# Patient Record
Sex: FEMALE | Race: WHITE | Employment: UNEMPLOYED | URBAN - NONMETROPOLITAN AREA
[De-identification: names, ages, dates, MRNs, and addresses within clinical notes are randomized per-mention and may not be internally consistent; named-entity substitution may affect disease eponyms.]

---

## 2016-05-14 LAB
AVERAGE GLUCOSE: NORMAL
HBA1C MFR BLD: 5.4 %

## 2019-03-29 LAB
ALBUMIN SERPL-MCNC: NORMAL G/DL
ALP BLD-CCNC: NORMAL U/L
ALT SERPL-CCNC: NORMAL U/L
ANION GAP SERPL CALCULATED.3IONS-SCNC: NORMAL MMOL/L
AST SERPL-CCNC: NORMAL U/L
BILIRUB SERPL-MCNC: NORMAL MG/DL
BUN BLDV-MCNC: NORMAL MG/DL
CALCIUM SERPL-MCNC: NORMAL MG/DL
CHLORIDE BLD-SCNC: NORMAL MMOL/L
CHOLESTEROL, TOTAL: 188 MG/DL
CHOLESTEROL, TOTAL: 188 MG/DL
CHOLESTEROL/HDL RATIO: 3.5
CHOLESTEROL/HDL RATIO: 3.5
CO2: NORMAL
CREAT SERPL-MCNC: NORMAL MG/DL
GFR CALCULATED: NORMAL
GLUCOSE BLD-MCNC: NORMAL MG/DL
HDLC SERPL-MCNC: 53 MG/DL (ref 35–70)
HDLC SERPL-MCNC: 53 MG/DL (ref 35–70)
LDL CHOLESTEROL CALCULATED: 117 MG/DL (ref 0–160)
LDL CHOLESTEROL CALCULATED: 117 MG/DL (ref 0–160)
POTASSIUM SERPL-SCNC: NORMAL MMOL/L
SODIUM BLD-SCNC: NORMAL MMOL/L
TOTAL PROTEIN: NORMAL
TRIGL SERPL-MCNC: 82 MG/DL
TRIGL SERPL-MCNC: 82 MG/DL
VLDLC SERPL CALC-MCNC: 136 MG/DL
VLDLC SERPL CALC-MCNC: NORMAL MG/DL

## 2019-09-25 RX ORDER — LEVOTHYROXINE SODIUM 0.05 MG/1
50 TABLET ORAL DAILY
COMMUNITY
End: 2019-09-30 | Stop reason: SDUPTHER

## 2019-09-30 ENCOUNTER — OFFICE VISIT (OUTPATIENT)
Dept: FAMILY MEDICINE CLINIC | Age: 54
End: 2019-09-30
Payer: COMMERCIAL

## 2019-09-30 ENCOUNTER — HOSPITAL ENCOUNTER (OUTPATIENT)
Age: 54
Discharge: HOME | End: 2019-10-02
Payer: COMMERCIAL

## 2019-09-30 VITALS
WEIGHT: 149.6 LBS | HEART RATE: 74 BPM | BODY MASS INDEX: 26.51 KG/M2 | SYSTOLIC BLOOD PRESSURE: 134 MMHG | HEIGHT: 63 IN | DIASTOLIC BLOOD PRESSURE: 84 MMHG | OXYGEN SATURATION: 97 % | TEMPERATURE: 98 F

## 2019-09-30 DIAGNOSIS — E03.9 HYPOTHYROIDISM, UNSPECIFIED TYPE: ICD-10-CM

## 2019-09-30 DIAGNOSIS — E03.9 HYPOTHYROIDISM, UNSPECIFIED TYPE: Primary | ICD-10-CM

## 2019-09-30 LAB
T4 FREE: 1.54 NG/DL (ref 0.93–1.7)
TSH SERPL DL<=0.05 MIU/L-ACNC: 3.76 UIU/ML (ref 0.27–4.2)

## 2019-09-30 PROCEDURE — 84439 ASSAY OF FREE THYROXINE: CPT

## 2019-09-30 PROCEDURE — 90686 IIV4 VACC NO PRSV 0.5 ML IM: CPT | Performed by: FAMILY MEDICINE

## 2019-09-30 PROCEDURE — 90471 IMMUNIZATION ADMIN: CPT | Performed by: FAMILY MEDICINE

## 2019-09-30 PROCEDURE — 99214 OFFICE O/P EST MOD 30 MIN: CPT | Performed by: FAMILY MEDICINE

## 2019-09-30 PROCEDURE — 84443 ASSAY THYROID STIM HORMONE: CPT

## 2019-09-30 PROCEDURE — 36415 COLL VENOUS BLD VENIPUNCTURE: CPT

## 2019-09-30 RX ORDER — UBIDECARENONE 75 MG
50 CAPSULE ORAL DAILY
COMMUNITY

## 2019-09-30 RX ORDER — M-VIT,TX,IRON,MINS/CALC/FOLIC 27MG-0.4MG
1 TABLET ORAL DAILY
COMMUNITY
End: 2020-05-07

## 2019-09-30 RX ORDER — LEVOTHYROXINE SODIUM 0.05 MG/1
50 TABLET ORAL DAILY
Qty: 90 TABLET | Refills: 1 | Status: SHIPPED | OUTPATIENT
Start: 2019-09-30 | End: 2019-11-22 | Stop reason: SDUPTHER

## 2019-09-30 RX ORDER — CETIRIZINE HYDROCHLORIDE 10 MG/1
10 TABLET ORAL DAILY
COMMUNITY
End: 2021-08-25

## 2019-09-30 ASSESSMENT — PATIENT HEALTH QUESTIONNAIRE - PHQ9
SUM OF ALL RESPONSES TO PHQ QUESTIONS 1-9: 0
SUM OF ALL RESPONSES TO PHQ9 QUESTIONS 1 & 2: 0
1. LITTLE INTEREST OR PLEASURE IN DOING THINGS: 0
SUM OF ALL RESPONSES TO PHQ QUESTIONS 1-9: 0
2. FEELING DOWN, DEPRESSED OR HOPELESS: 0

## 2019-09-30 ASSESSMENT — ENCOUNTER SYMPTOMS
ABDOMINAL PAIN: 0
CONSTIPATION: 0
BACK PAIN: 0
SORE THROAT: 0
SHORTNESS OF BREATH: 0
SINUS PAIN: 0
DIARRHEA: 0
COUGH: 0
EYE PAIN: 0

## 2019-10-10 ENCOUNTER — TELEPHONE (OUTPATIENT)
Dept: FAMILY MEDICINE CLINIC | Age: 54
End: 2019-10-10

## 2019-11-22 RX ORDER — LEVOTHYROXINE SODIUM 0.05 MG/1
50 TABLET ORAL DAILY
Qty: 90 TABLET | Refills: 1 | Status: SHIPPED
Start: 2019-11-22 | End: 2020-05-08 | Stop reason: SDUPTHER

## 2020-03-24 ENCOUNTER — TELEPHONE (OUTPATIENT)
Dept: FAMILY MEDICINE CLINIC | Age: 55
End: 2020-03-24

## 2020-04-16 ENCOUNTER — TELEPHONE (OUTPATIENT)
Dept: FAMILY MEDICINE CLINIC | Age: 55
End: 2020-04-16

## 2020-04-27 ENCOUNTER — TELEPHONE (OUTPATIENT)
Dept: FAMILY MEDICINE CLINIC | Age: 55
End: 2020-04-27

## 2020-04-28 ENCOUNTER — HOSPITAL ENCOUNTER (EMERGENCY)
Age: 55
Discharge: HOME | End: 2020-04-28
Attending: EMERGENCY MEDICINE
Payer: COMMERCIAL

## 2020-04-28 VITALS
WEIGHT: 150 LBS | TEMPERATURE: 97.7 F | SYSTOLIC BLOOD PRESSURE: 159 MMHG | BODY MASS INDEX: 26.58 KG/M2 | OXYGEN SATURATION: 98 % | DIASTOLIC BLOOD PRESSURE: 72 MMHG | RESPIRATION RATE: 16 BRPM | HEIGHT: 63 IN | HEART RATE: 91 BPM

## 2020-04-28 PROCEDURE — 99283 EMERGENCY DEPT VISIT LOW MDM: CPT

## 2020-04-28 RX ORDER — AMOXICILLIN AND CLAVULANATE POTASSIUM 875; 125 MG/1; MG/1
1 TABLET, FILM COATED ORAL 2 TIMES DAILY
Qty: 20 TABLET | Refills: 0 | Status: SHIPPED | OUTPATIENT
Start: 2020-04-28 | End: 2020-05-08

## 2020-04-28 RX ORDER — AZELASTINE 1 MG/ML
1 SPRAY, METERED NASAL 2 TIMES DAILY
Qty: 2 BOTTLE | Refills: 1 | Status: SHIPPED | OUTPATIENT
Start: 2020-04-28 | End: 2022-07-05 | Stop reason: SDUPTHER

## 2020-04-28 ASSESSMENT — ENCOUNTER SYMPTOMS
RHINORRHEA: 1
SHORTNESS OF BREATH: 0
BACK PAIN: 0
DIARRHEA: 0
SORE THROAT: 0
VOMITING: 0
BLOOD IN STOOL: 0
CONSTIPATION: 0
WHEEZING: 0
CHEST TIGHTNESS: 0
SINUS PRESSURE: 1
NAUSEA: 0
COUGH: 1
ABDOMINAL PAIN: 0

## 2020-04-28 NOTE — ED PROVIDER NOTES
Patient is 59-year-old female with no significant past medical history presents emergency department complaint of cough and nasal drainage. Patient states that she gets seasonal allergies twice a year. This is consistent with her seasonal allergies. States also that she has sinus pressure. Patient also concerned about left ear pain. Contacted her primary care physician but he directed her to come the emergency department instead of in their office. She has been using over-the-counter nasal sprays with little improvement. Patient has not established care with an ENT. She denies fever, chest pain, shortness of breath, abdominal pain, nausea/vomiting, other symptoms. Patient works at a nursing home. No COVID-19 patients at the nursing home. Review of Systems   Constitutional: Negative for appetite change, diaphoresis and fever. HENT: Positive for ear pain, postnasal drip, rhinorrhea and sinus pressure. Negative for congestion and sore throat. Eyes: Negative for visual disturbance. Respiratory: Positive for cough. Negative for chest tightness, shortness of breath and wheezing. Cardiovascular: Negative for chest pain, palpitations and leg swelling. Gastrointestinal: Negative for abdominal pain, blood in stool, constipation, diarrhea, nausea and vomiting. Endocrine: Negative for polyuria. Genitourinary: Negative for decreased urine volume, dysuria and vaginal discharge. Musculoskeletal: Negative for back pain, neck pain and neck stiffness. Skin: Negative for rash. Neurological: Negative for syncope, weakness, light-headedness and headaches. Hematological: Negative for adenopathy. Psychiatric/Behavioral: Negative for confusion. Physical Exam  Vitals signs and nursing note reviewed. Constitutional:       General: She is not in acute distress. Appearance: Normal appearance. She is normal weight. She is not ill-appearing, toxic-appearing or diaphoretic.    HENT: Head: Normocephalic and atraumatic. Right Ear: Tympanic membrane, ear canal and external ear normal. There is no impacted cerumen. Left Ear: External ear normal. There is no impacted cerumen. Ears:      Comments: Exudate noted behind left tympanic membrane. Nose: Rhinorrhea present. No congestion. Mouth/Throat:      Mouth: Mucous membranes are moist.      Pharynx: Posterior oropharyngeal erythema present. No oropharyngeal exudate. Comments: Erythematous oropharynx with cobblestoning. No tonsillar hypertrophy. Eyes:      General: No scleral icterus. Right eye: No discharge. Left eye: No discharge. Extraocular Movements: Extraocular movements intact. Pupils: Pupils are equal, round, and reactive to light. Neck:      Musculoskeletal: Normal range of motion and neck supple. No neck rigidity or muscular tenderness. Vascular: No carotid bruit. Cardiovascular:      Rate and Rhythm: Normal rate and regular rhythm. Pulses: Normal pulses. Heart sounds: Normal heart sounds. No murmur. No friction rub. No gallop. Pulmonary:      Effort: Pulmonary effort is normal. No respiratory distress. Breath sounds: Normal breath sounds. No stridor. No wheezing, rhonchi or rales. Chest:      Chest wall: No tenderness. Abdominal:      General: Abdomen is flat. There is no distension. Palpations: Abdomen is soft. There is no mass. Tenderness: There is no abdominal tenderness. There is no right CVA tenderness, left CVA tenderness, guarding or rebound. Hernia: No hernia is present. Musculoskeletal: Normal range of motion. General: No swelling, tenderness, deformity or signs of injury. Right lower leg: No edema. Left lower leg: No edema. Lymphadenopathy:      Cervical: No cervical adenopathy. Skin:     General: Skin is warm. Capillary Refill: Capillary refill takes less than 2 seconds. Findings: No rash. Seasonal allergic rhinitis, unspecified trigger    2. Acute otitis media, unspecified otitis media type    3. Viral URI with cough        Disposition:  Patient's disposition: Discharge to home  Patient's condition is stable.            Aleks Egan,   Resident  04/28/20 [id number]

## 2020-04-28 NOTE — ED NOTES
Bed: 02  Expected date:   Expected time:   Means of arrival:   Comments:  Walking well resp     Hallie Daniels RN  04/28/20 [id number]

## 2020-04-30 ENCOUNTER — TELEPHONE (OUTPATIENT)
Dept: PRIMARY CARE CLINIC | Age: 55
End: 2020-04-30

## 2020-05-07 ENCOUNTER — VIRTUAL VISIT (OUTPATIENT)
Dept: PRIMARY CARE CLINIC | Age: 55
End: 2020-05-07
Payer: COMMERCIAL

## 2020-05-07 PROCEDURE — 99214 OFFICE O/P EST MOD 30 MIN: CPT | Performed by: FAMILY MEDICINE

## 2020-05-08 RX ORDER — LEVOTHYROXINE SODIUM 0.05 MG/1
50 TABLET ORAL DAILY
Qty: 90 TABLET | Refills: 1 | Status: SHIPPED
Start: 2020-05-08 | End: 2020-12-11 | Stop reason: SDUPTHER

## 2020-05-17 PROBLEM — H66.90 ACUTE OTITIS MEDIA: Status: ACTIVE | Noted: 2020-05-17

## 2020-05-17 PROBLEM — E03.9 ACQUIRED HYPOTHYROIDISM: Status: ACTIVE | Noted: 2020-05-17

## 2020-05-17 ASSESSMENT — ENCOUNTER SYMPTOMS
VOMITING: 0
COUGH: 0
CONSTIPATION: 0
WHEEZING: 0
NAUSEA: 0
BACK PAIN: 0
SHORTNESS OF BREATH: 0
DIARRHEA: 0
ABDOMINAL PAIN: 0

## 2020-07-07 PROBLEM — H66.90 ACUTE OTITIS MEDIA: Status: RESOLVED | Noted: 2020-05-17 | Resolved: 2020-07-07

## 2020-07-07 ASSESSMENT — ENCOUNTER SYMPTOMS
DIARRHEA: 0
WHEEZING: 0
COUGH: 0
VOMITING: 0
NAUSEA: 0
CONSTIPATION: 0
ABDOMINAL PAIN: 0
SHORTNESS OF BREATH: 0
BACK PAIN: 0

## 2020-07-08 ENCOUNTER — OFFICE VISIT (OUTPATIENT)
Dept: PRIMARY CARE CLINIC | Age: 55
End: 2020-07-08
Payer: COMMERCIAL

## 2020-07-08 ENCOUNTER — HOSPITAL ENCOUNTER (OUTPATIENT)
Age: 55
Discharge: HOME | End: 2020-07-10
Payer: COMMERCIAL

## 2020-07-08 VITALS
HEART RATE: 82 BPM | DIASTOLIC BLOOD PRESSURE: 80 MMHG | OXYGEN SATURATION: 98 % | SYSTOLIC BLOOD PRESSURE: 110 MMHG | WEIGHT: 139 LBS | BODY MASS INDEX: 24.63 KG/M2 | HEIGHT: 63 IN

## 2020-07-08 LAB
ALBUMIN SERPL-MCNC: 4.5 G/DL (ref 3.5–5.2)
ALP BLD-CCNC: 67 U/L (ref 35–104)
ALT SERPL-CCNC: 9 U/L (ref 0–32)
ANION GAP SERPL CALCULATED.3IONS-SCNC: 16 MMOL/L (ref 7–16)
AST SERPL-CCNC: 12 U/L (ref 0–31)
BASOPHILS ABSOLUTE: 0.07 E9/L (ref 0–0.2)
BASOPHILS RELATIVE PERCENT: 1 % (ref 0–2)
BILIRUB SERPL-MCNC: 0.4 MG/DL (ref 0–1.2)
BUN BLDV-MCNC: 10 MG/DL (ref 6–20)
CALCIUM SERPL-MCNC: 9.2 MG/DL (ref 8.6–10.2)
CHLORIDE BLD-SCNC: 101 MMOL/L (ref 98–107)
CHOLESTEROL, TOTAL: 193 MG/DL (ref 0–199)
CO2: 23 MMOL/L (ref 22–29)
CREAT SERPL-MCNC: 0.9 MG/DL (ref 0.5–1)
CREATININE URINE: 67 MG/DL (ref 29–226)
EOSINOPHILS ABSOLUTE: 0.27 E9/L (ref 0.05–0.5)
EOSINOPHILS RELATIVE PERCENT: 3.7 % (ref 0–6)
GFR AFRICAN AMERICAN: >60
GFR NON-AFRICAN AMERICAN: >60 ML/MIN/1.73
GLUCOSE BLD-MCNC: 103 MG/DL (ref 74–99)
HBA1C MFR BLD: 5.3 % (ref 4–5.6)
HCT VFR BLD CALC: 40.2 % (ref 34–48)
HDLC SERPL-MCNC: 61 MG/DL
HEMOGLOBIN: 12.7 G/DL (ref 11.5–15.5)
IMMATURE GRANULOCYTES #: 0.02 E9/L
IMMATURE GRANULOCYTES %: 0.3 % (ref 0–5)
LDL CHOLESTEROL CALCULATED: 115 MG/DL (ref 0–99)
LYMPHOCYTES ABSOLUTE: 2.64 E9/L (ref 1.5–4)
LYMPHOCYTES RELATIVE PERCENT: 36.2 % (ref 20–42)
MCH RBC QN AUTO: 28.2 PG (ref 26–35)
MCHC RBC AUTO-ENTMCNC: 31.6 % (ref 32–34.5)
MCV RBC AUTO: 89.1 FL (ref 80–99.9)
MICROALBUMIN UR-MCNC: 38.4 MG/L
MICROALBUMIN/CREAT UR-RTO: 57.3 (ref 0–30)
MONOCYTES ABSOLUTE: 0.53 E9/L (ref 0.1–0.95)
MONOCYTES RELATIVE PERCENT: 7.3 % (ref 2–12)
NEUTROPHILS ABSOLUTE: 3.76 E9/L (ref 1.8–7.3)
NEUTROPHILS RELATIVE PERCENT: 51.5 % (ref 43–80)
PDW BLD-RTO: 13.8 FL (ref 11.5–15)
PLATELET # BLD: 323 E9/L (ref 130–450)
PMV BLD AUTO: 10 FL (ref 7–12)
POTASSIUM SERPL-SCNC: 4 MMOL/L (ref 3.5–5)
RBC # BLD: 4.51 E12/L (ref 3.5–5.5)
SODIUM BLD-SCNC: 140 MMOL/L (ref 132–146)
TOTAL PROTEIN: 7.2 G/DL (ref 6.4–8.3)
TRIGL SERPL-MCNC: 83 MG/DL (ref 0–149)
TSH SERPL DL<=0.05 MIU/L-ACNC: 3.25 UIU/ML (ref 0.27–4.2)
URIC ACID, SERUM: 4.9 MG/DL (ref 2.4–5.7)
VITAMIN D 25-HYDROXY: 31 NG/ML (ref 30–100)
VLDLC SERPL CALC-MCNC: 17 MG/DL
WBC # BLD: 7.3 E9/L (ref 4.5–11.5)

## 2020-07-08 PROCEDURE — 83036 HEMOGLOBIN GLYCOSYLATED A1C: CPT

## 2020-07-08 PROCEDURE — 80061 LIPID PANEL: CPT

## 2020-07-08 PROCEDURE — 80053 COMPREHEN METABOLIC PANEL: CPT

## 2020-07-08 PROCEDURE — 82044 UR ALBUMIN SEMIQUANTITATIVE: CPT

## 2020-07-08 PROCEDURE — 82306 VITAMIN D 25 HYDROXY: CPT

## 2020-07-08 PROCEDURE — 84443 ASSAY THYROID STIM HORMONE: CPT

## 2020-07-08 PROCEDURE — 36415 COLL VENOUS BLD VENIPUNCTURE: CPT

## 2020-07-08 PROCEDURE — 82570 ASSAY OF URINE CREATININE: CPT

## 2020-07-08 PROCEDURE — 85025 COMPLETE CBC W/AUTO DIFF WBC: CPT

## 2020-07-08 PROCEDURE — 84550 ASSAY OF BLOOD/URIC ACID: CPT

## 2020-07-08 PROCEDURE — 99396 PREV VISIT EST AGE 40-64: CPT | Performed by: FAMILY MEDICINE

## 2020-07-08 ASSESSMENT — PATIENT HEALTH QUESTIONNAIRE - PHQ9
2. FEELING DOWN, DEPRESSED OR HOPELESS: 0
SUM OF ALL RESPONSES TO PHQ9 QUESTIONS 1 & 2: 0
SUM OF ALL RESPONSES TO PHQ QUESTIONS 1-9: 0
1. LITTLE INTEREST OR PLEASURE IN DOING THINGS: 0
SUM OF ALL RESPONSES TO PHQ QUESTIONS 1-9: 0

## 2020-07-08 NOTE — PROGRESS NOTES
Dispense Refill    levothyroxine (SYNTHROID) 50 MCG tablet Take 1 tablet by mouth Daily 90 tablet 1    azelastine (ASTELIN) 0.1 % nasal spray 1 spray by Nasal route 2 times daily Use in each nostril as directed 2 Bottle 1    vitamin B-12 (CYANOCOBALAMIN) 100 MCG tablet Take 50 mcg by mouth daily      cetirizine (ZYRTEC) 10 MG tablet Take 10 mg by mouth daily       No current facility-administered medications on file prior to visit. Allergies   Allergen Reactions    Oxycodone-Acetaminophen Other (See Comments)       Past Medical History:   Diagnosis Date    Anxiety     Depression     Hypothyroidism      Past Surgical History:   Procedure Laterality Date    BREAST BIOPSY Left     PARTIAL HYSTERECTOMY Left     Left ovary gone due to endometriosis - has cervix and right ovary     History reviewed. No pertinent family history.   Social History     Socioeconomic History    Marital status: Unknown     Spouse name: Not on file    Number of children: Not on file    Years of education: Not on file    Highest education level: Not on file   Occupational History    Not on file   Social Needs    Financial resource strain: Not on file    Food insecurity     Worry: Not on file     Inability: Not on file    Transportation needs     Medical: Not on file     Non-medical: Not on file   Tobacco Use    Smoking status: Former Smoker     Packs/day: 1.00     Years: 10.00     Pack years: 10.00     Types: Cigarettes    Smokeless tobacco: Never Used   Substance and Sexual Activity    Alcohol use: Not Currently     Comment: margaret     Drug use: Never    Sexual activity: Not on file   Lifestyle    Physical activity     Days per week: Not on file     Minutes per session: Not on file    Stress: Not on file   Relationships    Social connections     Talks on phone: Not on file     Gets together: Not on file     Attends Holiness service: Not on file     Active member of club or organization: Not on file     Attends meetings of clubs or organizations: Not on file     Relationship status: Not on file    Intimate partner violence     Fear of current or ex partner: Not on file     Emotionally abused: Not on file     Physically abused: Not on file     Forced sexual activity: Not on file   Other Topics Concern    Not on file   Social History Narrative    Not on file       Vitals:    07/08/20 0836   BP: 110/80   Pulse: 82   SpO2: 98%   Weight: 139 lb (63 kg)   Height: 5' 3\" (1.6 m)       Physical Exam:  Physical Exam  Vitals signs and nursing note reviewed. Constitutional:       General: She is not in acute distress. Appearance: Normal appearance. She is well-developed. HENT:      Head: Normocephalic and atraumatic. Right Ear: Hearing and external ear normal.      Left Ear: Hearing and external ear normal.      Nose: Nose normal.   Eyes:      General: Lids are normal. No scleral icterus. Extraocular Movements: Extraocular movements intact. Conjunctiva/sclera: Conjunctivae normal.   Neck:      Musculoskeletal: Normal range of motion. Pulmonary:      Effort: Pulmonary effort is normal. No respiratory distress. Breath sounds: No wheezing. Musculoskeletal: Normal range of motion. Skin:     Findings: No rash. Neurological:      Mental Status: She is alert and oriented to person, place, and time.    Psychiatric:         Mood and Affect: Mood and affect normal.         Speech: Speech normal.         Behavior: Behavior normal.         Labs:  CBC with Differential:  No results found for: WBC, RBC, HGB, HCT, PLT, MCV, MCH, MCHC, RDW, NRBC, SEGSPCT, BANDSPCT, BLASTSPCT, METASPCT, LYMPHOPCT, PROMYELOPCT, MONOPCT, MYELOPCT, EOSPCT, BASOPCT, MONOSABS, LYMPHSABS, EOSABS, BASOSABS, DIFFTYPE  CMP:  No results found for: NA, K, CL, CO2, BUN, CREATININE, GFRAA, AGRATIO, LABGLOM, GLUCOSE, PROT, LABALBU, CALCIUM, BILITOT, ALKPHOS, AST, ALT  HgBA1c:    Lab Results   Component Value Date    LABA1C 5.4 05/14/2016 FLP:    Lab Results   Component Value Date    TRIG 82 03/29/2019    HDL 53 03/29/2019    LDLCALC 117 03/29/2019     TSH:    Lab Results   Component Value Date    TSH 3.760 09/30/2019        Assessment and Plan:  Teresita Jane was seen today for annual exam and hypothyroidism. Diagnoses and all orders for this visit:    Encounter for well adult exam with abnormal findings  -     Lipid Panel; Future  Healthy 55 yo female. Due for PAP, Mammo, Colonoscopy, but she is very busy with  health issues and moving. Would like to do cologuard for now. Due for labs. Form for insurance filled out and faxed. Acquired hypothyroidism  -     CBC Auto Differential; Future  -     Comprehensive Metabolic Panel; Future  -     TSH without Reflex; Future  -     Uric Acid; Future  -     Microalbumin / Creatinine Urine Ratio; Future  Due for labs. Has been controlled. Symptoms controlled. Screening for malignant neoplasm of colon  -     Cologuard (For External Results Only); Future  Low risk. Would like to do cologuard first.     Vitamin D insufficiency  -     Vitamin D 25 Hydroxy; Future    Impaired fasting glucose  -     Hemoglobin A1C; Future        Return in about 6 months (around 1/8/2021) for Recheck.       Seen By:  Elizabeth Hinojosa DO

## 2020-07-29 ENCOUNTER — TELEPHONE (OUTPATIENT)
Dept: PRIMARY CARE CLINIC | Age: 55
End: 2020-07-29

## 2020-07-29 NOTE — TELEPHONE ENCOUNTER
ADVISED PT THAT HENRI WAS POS.  SHE IS GOING TO HOLD OFF ON COLONOSCOPY AT THIS TIME AS SHE HAS TOO MUCH GOING ON

## 2020-12-11 RX ORDER — LEVOTHYROXINE SODIUM 0.05 MG/1
50 TABLET ORAL DAILY
Qty: 90 TABLET | Refills: 0 | Status: SHIPPED
Start: 2020-12-11 | End: 2021-01-15 | Stop reason: SDUPTHER

## 2021-01-15 ENCOUNTER — OFFICE VISIT (OUTPATIENT)
Dept: FAMILY MEDICINE CLINIC | Age: 56
End: 2021-01-15
Payer: COMMERCIAL

## 2021-01-15 VITALS
OXYGEN SATURATION: 97 % | SYSTOLIC BLOOD PRESSURE: 116 MMHG | TEMPERATURE: 98 F | HEART RATE: 77 BPM | WEIGHT: 145 LBS | HEIGHT: 63 IN | BODY MASS INDEX: 25.69 KG/M2 | DIASTOLIC BLOOD PRESSURE: 78 MMHG

## 2021-01-15 DIAGNOSIS — Z12.31 ENCOUNTER FOR SCREENING MAMMOGRAM FOR MALIGNANT NEOPLASM OF BREAST: ICD-10-CM

## 2021-01-15 DIAGNOSIS — E03.9 ACQUIRED HYPOTHYROIDISM: Primary | ICD-10-CM

## 2021-01-15 PROCEDURE — 99213 OFFICE O/P EST LOW 20 MIN: CPT | Performed by: FAMILY MEDICINE

## 2021-01-15 RX ORDER — LEVOTHYROXINE SODIUM 0.05 MG/1
50 TABLET ORAL DAILY
Qty: 90 TABLET | Refills: 3 | Status: SHIPPED
Start: 2021-01-15 | End: 2022-01-14 | Stop reason: SDUPTHER

## 2021-01-15 ASSESSMENT — ENCOUNTER SYMPTOMS
VOMITING: 0
COUGH: 0
SHORTNESS OF BREATH: 0
WHEEZING: 0
DIARRHEA: 0
NAUSEA: 0
BACK PAIN: 0
CONSTIPATION: 0
ABDOMINAL PAIN: 0

## 2021-01-15 ASSESSMENT — PATIENT HEALTH QUESTIONNAIRE - PHQ9
1. LITTLE INTEREST OR PLEASURE IN DOING THINGS: 0
2. FEELING DOWN, DEPRESSED OR HOPELESS: 0
SUM OF ALL RESPONSES TO PHQ QUESTIONS 1-9: 0

## 2021-01-15 NOTE — PROGRESS NOTES
1/15/21  [place] : 1965 Sex: female  Age: 54 y.o. Chief Complaint   Patient presents with    Thyroid Problem     HPI:  54 y.o. female patient presents today for 6 month follow up of chronic medical conditions, medication refills. Patient's chart, medical, surgical and medication history all reviewed. Hypothyroidism  Patient presents for routine follow up of Hypothyroidism. Current symptoms: none. Patient denies change in energy level, diarrhea, heat / cold intolerance, nervousness, palpitations and weight changes. Symptoms have been well-controlled. No difficulty swallowing or masses felt. Last TSH was 3.250. Patient is currently taking levothyroxine 50 mcg. ROS:  Review of Systems   Constitutional: Negative for chills, fatigue and fever. Respiratory: Negative for cough, shortness of breath and wheezing. Cardiovascular: Negative for chest pain and palpitations. Gastrointestinal: Negative for abdominal pain, constipation, diarrhea, nausea and vomiting. Musculoskeletal: Negative for arthralgias and back pain. Skin: Negative for rash. Neurological: Negative for dizziness and headaches. Psychiatric/Behavioral: Negative for dysphoric mood. The patient is not nervous/anxious. All other systems reviewed and are negative. Current Outpatient Medications on File Prior to Visit   Medication Sig Dispense Refill    azelastine (ASTELIN) 0.1 % nasal spray 1 spray by Nasal route 2 times daily Use in each nostril as directed 2 Bottle 1    vitamin B-12 (CYANOCOBALAMIN) 100 MCG tablet Take 50 mcg by mouth daily      cetirizine (ZYRTEC) 10 MG tablet Take 10 mg by mouth daily       No current facility-administered medications on file prior to visit.         Allergies   Allergen Reactions    Oxycodone-Acetaminophen Other (See Comments)       Past Medical History:   Diagnosis Date    Anxiety     Depression     Hypothyroidism      Past Surgical History:   Procedure Laterality Date    BREAST BIOPSY Left     PARTIAL HYSTERECTOMY Left     Left ovary gone due to endometriosis - has cervix and right ovary     History reviewed. No pertinent family history. Social History     Socioeconomic History    Marital status: Unknown     Spouse name: Not on file    Number of children: Not on file    Years of education: Not on file    Highest education level: Not on file   Occupational History    Not on file   Social Needs    Financial resource strain: Not on file    Food insecurity     Worry: Not on file     Inability: Not on file    Transportation needs     Medical: Not on file     Non-medical: Not on file   Tobacco Use    Smoking status: Former Smoker     Packs/day: 1.00     Years: 10.00     Pack years: 10.00     Types: Cigarettes    Smokeless tobacco: Never Used   Substance and Sexual Activity    Alcohol use: Not Currently     Comment: margaret     Drug use: Never    Sexual activity: Not on file   Lifestyle    Physical activity     Days per week: Not on file     Minutes per session: Not on file    Stress: Not on file   Relationships    Social connections     Talks on phone: Not on file     Gets together: Not on file     Attends Baptism service: Not on file     Active member of club or organization: Not on file     Attends meetings of clubs or organizations: Not on file     Relationship status: Not on file    Intimate partner violence     Fear of current or ex partner: Not on file     Emotionally abused: Not on file     Physically abused: Not on file     Forced sexual activity: Not on file   Other Topics Concern    Not on file   Social History Narrative    Not on file       Vitals:    01/15/21 0911   BP: 116/78   Pulse: 77   Temp: 98 °F (36.7 °C)   TempSrc: Temporal   SpO2: 97%   Weight: 145 lb (65.8 kg)   Height: 5' 3\" (1.6 m)       Physical Exam:  Physical Exam  Vitals signs and nursing note reviewed. Constitutional:       General: She is not in acute distress.      Appearance: Normal appearance. She is well-developed. HENT:      Head: Normocephalic and atraumatic. Right Ear: Hearing and external ear normal.      Left Ear: Hearing and external ear normal.      Nose: Nose normal.   Eyes:      General: Lids are normal. No scleral icterus. Extraocular Movements: Extraocular movements intact. Conjunctiva/sclera: Conjunctivae normal.   Neck:      Musculoskeletal: Normal range of motion. Pulmonary:      Effort: Pulmonary effort is normal. No respiratory distress. Breath sounds: No wheezing. Musculoskeletal: Normal range of motion. Skin:     Findings: No rash. Neurological:      Mental Status: She is alert and oriented to person, place, and time.    Psychiatric:         Mood and Affect: Mood and affect normal.         Speech: Speech normal.         Behavior: Behavior normal.         Labs:  CBC with Differential:    Lab Results   Component Value Date    WBC 7.3 07/08/2020    RBC 4.51 07/08/2020    HGB 12.7 07/08/2020    HCT 40.2 07/08/2020     07/08/2020    MCV 89.1 07/08/2020    MCH 28.2 07/08/2020    MCHC 31.6 07/08/2020    RDW 13.8 07/08/2020    LYMPHOPCT 36.2 07/08/2020    MONOPCT 7.3 07/08/2020    BASOPCT 1.0 07/08/2020    MONOSABS 0.53 07/08/2020    LYMPHSABS 2.64 07/08/2020    EOSABS 0.27 07/08/2020    BASOSABS 0.07 07/08/2020     CMP:    Lab Results   Component Value Date     07/08/2020    K 4.0 07/08/2020     07/08/2020    CO2 23 07/08/2020    BUN 10 07/08/2020    CREATININE 0.9 07/08/2020    GFRAA >60 07/08/2020    LABGLOM >60 07/08/2020    GLUCOSE 103 07/08/2020    PROT 7.2 07/08/2020    LABALBU 4.5 07/08/2020    CALCIUM 9.2 07/08/2020    BILITOT 0.4 07/08/2020    ALKPHOS 67 07/08/2020    AST 12 07/08/2020    ALT 9 07/08/2020     HgBA1c:    Lab Results   Component Value Date    LABA1C 5.3 07/08/2020     FLP:    Lab Results   Component Value Date    TRIG 83 07/08/2020    HDL 61 07/08/2020    LDLCALC 115 07/08/2020    LABVLDL 17 07/08/2020 TSH:    Lab Results   Component Value Date    TSH 3.250 07/08/2020        Assessment and Plan:  Yariel Cruz was seen today for thyroid problem. Diagnoses and all orders for this visit:    Acquired hypothyroidism  -     levothyroxine (SYNTHROID) 50 MCG tablet; Take 1 tablet by mouth Daily  Stable. No symptoms. Recheck in 1 year. Encounter for screening mammogram for malignant neoplasm of breast  -     [facility] DIGITAL SCREEN BILATERAL; Future  Overdue. Last checked in 2016    Needs colonoscopy due to positive Cologuard, but dealing with a lot with  mental health and court issues. Would like to hold off for now. Return in about 1 year (around 1/15/2022) for Well visit.       Seen By:  Osiel Smith,

## 2021-04-15 ENCOUNTER — TELEPHONE (OUTPATIENT)
Dept: GENERAL RADIOLOGY | Age: 56
End: 2021-04-15

## 2021-04-15 DIAGNOSIS — R92.8 ABNORMAL MAMMOGRAM: Primary | ICD-10-CM

## 2021-04-15 NOTE — TELEPHONE ENCOUNTER
Call to patient in reference to her mammogram performed at [facility] on April 14, 2021. Instructed patient that the radiologist has recommended some additional breast imaging  in order to make a final determination/result. Additional imaging to be completed at [address] [place] [address] Dr Art. Verbalizes understanding and is agreeable to proceed. Patient states she will give me a call back next week, after she gets her work schedule.       Justin Iqbal RN, BSN, [address]

## 2021-04-22 ENCOUNTER — HOSPITAL ENCOUNTER (OUTPATIENT)
Dept: GENERAL RADIOLOGY | Age: 56
Discharge: HOME | End: 2021-04-24
Payer: COMMERCIAL

## 2021-04-22 DIAGNOSIS — R92.8 ABNORMAL MAMMOGRAM OF RIGHT BREAST: ICD-10-CM

## 2021-04-22 DIAGNOSIS — R92.8 ABNORMAL MAMMOGRAM: ICD-10-CM

## 2021-04-22 PROCEDURE — 77065 DX MAMMO INCL CAD UNI: CPT

## 2021-04-22 PROCEDURE — 76642 ULTRASOUND BREAST LIMITED: CPT

## 2021-04-23 DIAGNOSIS — R92.8 ABNORMAL MAMMOGRAM OF RIGHT BREAST: Primary | ICD-10-CM

## 2021-04-27 ENCOUNTER — TELEPHONE (OUTPATIENT)
Dept: GENERAL RADIOLOGY | Age: 56
End: 2021-04-27

## 2021-04-27 NOTE — TELEPHONE ENCOUNTER
I was notified per [facility]  that patient called and stated she wants to wait until May to schedule her biopsy due to her work schedule.  Electronically signed by Gaye Michaud RN, BSN on 4/27/2021 at 8:46 AM

## 2021-05-06 ENCOUNTER — HOSPITAL ENCOUNTER (OUTPATIENT)
Dept: GENERAL RADIOLOGY | Age: 56
Discharge: HOME | End: 2021-05-08
Payer: COMMERCIAL

## 2021-05-06 DIAGNOSIS — R92.8 ABNORMAL MAMMOGRAM OF RIGHT BREAST: ICD-10-CM

## 2021-05-06 PROCEDURE — 77065 DX MAMMO INCL CAD UNI: CPT

## 2021-05-06 PROCEDURE — 88342 IMHCHEM/IMCYTCHM 1ST ANTB: CPT

## 2021-05-06 PROCEDURE — 88360 TUMOR IMMUNOHISTOCHEM/MANUAL: CPT

## 2021-05-06 PROCEDURE — A4648 IMPLANTABLE TISSUE MARKER: HCPCS

## 2021-05-06 PROCEDURE — 88341 IMHCHEM/IMCYTCHM EA ADD ANTB: CPT

## 2021-05-06 PROCEDURE — 2500000003 HC RX 250 WO HCPCS

## 2021-05-06 PROCEDURE — 88305 TISSUE EXAM BY PATHOLOGIST: CPT

## 2021-05-06 NOTE — PROGRESS NOTES
Met with patient and her  prior to her breast biopsy. Instructed on role of breast navigator and on breast biopsy procedure. Denies use of blood thinners or aspirin products within the past 5 days. I remained with her during the biopsy to provide instruction and emotional support. She tolerated procedure well. Instructed that results will be available in approximately 3-5 days. Provided with folder containing breast navigator's contact information, monthly breast self exam card, and post biopsy discharge instructions. Instructed to call if she has any questions or concerns about her biopsy. Verbalizes understanding.

## 2021-05-13 ENCOUNTER — TELEPHONE (OUTPATIENT)
Dept: GENERAL RADIOLOGY | Age: 56
End: 2021-05-13

## 2021-05-17 DIAGNOSIS — C50.911 INFILTRATING DUCTAL CARCINOMA OF RIGHT BREAST (HCC): Primary | ICD-10-CM

## 2021-05-18 ENCOUNTER — CLINICAL DOCUMENTATION (OUTPATIENT)
Dept: GENERAL RADIOLOGY | Age: 56
End: 2021-05-18

## 2021-05-18 ENCOUNTER — TELEPHONE (OUTPATIENT)
Dept: BREAST CENTER | Age: 56
End: 2021-05-18

## 2021-05-18 NOTE — PROGRESS NOTES
Per Dee Saravia at Dr. Estee Carmichael office, Carlos Boxer was contacted regarding referral to Dr. Vicenta Myers for surgical consultation and was offered an appointment. She declined to schedule appointment, when they had her on the phone yesterday. Packet placed in outgoing mail containing a guide to the breast cancer pathology report, handout on the diagnosis and treatment of invasive ductal carcinoma , exercises after breast surgery booklet,  [facility]olita's Sister Support group information, and list of outpatient counseling agencies and list of local oncology practices.   Electronically signed by Deanna Reyes RN, BSN on 5/18/2021 at 7:59 AM

## 2021-05-19 ENCOUNTER — TELEPHONE (OUTPATIENT)
Dept: BREAST CENTER | Age: 56
End: 2021-05-19

## 2021-05-19 ENCOUNTER — TELEPHONE (OUTPATIENT)
Dept: PRIMARY CARE CLINIC | Age: 56
End: 2021-05-19

## 2021-05-19 DIAGNOSIS — C50.911 INVASIVE DUCTAL CARCINOMA OF RIGHT BREAST (HCC): Primary | ICD-10-CM

## 2021-05-19 NOTE — TELEPHONE ENCOUNTER
The pt is calling because she has people calling her about referrals but she has no idea why, she says no one from the office has called and told her anything about anything

## 2021-05-20 ENCOUNTER — TELEPHONE (OUTPATIENT)
Dept: BREAST CENTER | Age: 56
End: 2021-05-20

## 2021-05-20 DIAGNOSIS — C50.911 MALIGNANT NEOPLASM OF RIGHT BREAST IN FEMALE, ESTROGEN RECEPTOR NEGATIVE, UNSPECIFIED SITE OF BREAST (HCC): Primary | ICD-10-CM

## 2021-05-20 DIAGNOSIS — Z17.1 MALIGNANT NEOPLASM OF RIGHT BREAST IN FEMALE, ESTROGEN RECEPTOR NEGATIVE, UNSPECIFIED SITE OF BREAST (HCC): Primary | ICD-10-CM

## 2021-05-20 NOTE — TELEPHONE ENCOUNTER
Called patient to obtain some information regarding her upcoming appointment in the Breast Clinic. Discussed family history and let her know she is a candidate for Genetic testing. Discussed that if is is convenient, we can obtain that sample along with her labs prior to her appointment. I also asked the patient if it is recommended for a breast MRI, would there be a date accommodating to her. Patient states she is off on Tuesday, May 25, 2021. Told patient I will discuss the above with Dr Fela Barillas and will call her back with plans moving forward.

## 2021-05-24 ENCOUNTER — TELEPHONE (OUTPATIENT)
Dept: BREAST CENTER | Age: 56
End: 2021-05-24

## 2021-05-24 NOTE — TELEPHONE ENCOUNTER
Hello,  I was out last week that is why I am just now responding. Ok to order MRI, but I don't know if you'll have insurance auth by tomorrow.

## 2021-05-24 NOTE — TELEPHONE ENCOUNTER
Called patient and left detailed message regarding her MRI and labs for 5/25/2021. Told patient we are still waiting for authorization but will notify her by the end of today either way.   Our number left for any questions

## 2021-05-25 ENCOUNTER — TELEPHONE (OUTPATIENT)
Dept: BREAST CENTER | Age: 56
End: 2021-05-25

## 2021-05-25 ENCOUNTER — HOSPITAL ENCOUNTER (OUTPATIENT)
Dept: MRI IMAGING | Age: 56
Discharge: HOME | End: 2021-05-27
Payer: COMMERCIAL

## 2021-05-25 DIAGNOSIS — C50.911 MALIGNANT NEOPLASM OF RIGHT BREAST IN FEMALE, ESTROGEN RECEPTOR NEGATIVE, UNSPECIFIED SITE OF BREAST (HCC): ICD-10-CM

## 2021-05-25 DIAGNOSIS — C50.911 INVASIVE DUCTAL CARCINOMA OF RIGHT BREAST (HCC): ICD-10-CM

## 2021-05-25 DIAGNOSIS — Z17.1 MALIGNANT NEOPLASM OF RIGHT BREAST IN FEMALE, ESTROGEN RECEPTOR NEGATIVE, UNSPECIFIED SITE OF BREAST (HCC): ICD-10-CM

## 2021-05-25 LAB
ALBUMIN SERPL-MCNC: 4.6 G/DL (ref 3.5–5.2)
ALP BLD-CCNC: 67 U/L (ref 35–104)
ALT SERPL-CCNC: 10 U/L (ref 0–32)
ANION GAP SERPL CALCULATED.3IONS-SCNC: 6 MMOL/L (ref 7–16)
AST SERPL-CCNC: 13 U/L (ref 0–31)
BILIRUB SERPL-MCNC: 0.4 MG/DL (ref 0–1.2)
BUN BLDV-MCNC: 12 MG/DL (ref 6–20)
CALCIUM SERPL-MCNC: 9.4 MG/DL (ref 8.6–10.2)
CHLORIDE BLD-SCNC: 106 MMOL/L (ref 98–107)
CO2: 27 MMOL/L (ref 22–29)
CREAT SERPL-MCNC: 0.7 MG/DL (ref 0.5–1)
GFR AFRICAN AMERICAN: >60
GFR NON-AFRICAN AMERICAN: >60 ML/MIN/1.73
GLUCOSE BLD-MCNC: 96 MG/DL (ref 74–99)
HCT VFR BLD CALC: 40 % (ref 34–48)
HEMOGLOBIN: 12.8 G/DL (ref 11.5–15.5)
MCH RBC QN AUTO: 28 PG (ref 26–35)
MCHC RBC AUTO-ENTMCNC: 32 % (ref 32–34.5)
MCV RBC AUTO: 87.5 FL (ref 80–99.9)
PDW BLD-RTO: 13.4 FL (ref 11.5–15)
PLATELET # BLD: 311 E9/L (ref 130–450)
PMV BLD AUTO: 8.6 FL (ref 7–12)
POTASSIUM SERPL-SCNC: 4.3 MMOL/L (ref 3.5–5)
RBC # BLD: 4.57 E12/L (ref 3.5–5.5)
SODIUM BLD-SCNC: 139 MMOL/L (ref 132–146)
TOTAL PROTEIN: 7.7 G/DL (ref 6.4–8.3)
WBC # BLD: 6.5 E9/L (ref 4.5–11.5)

## 2021-05-25 PROCEDURE — A9585 GADOBUTROL INJECTION: HCPCS | Performed by: RADIOLOGY

## 2021-05-25 PROCEDURE — 77049 MRI BREAST C-+ W/CAD BI: CPT

## 2021-05-25 PROCEDURE — 6360000004 HC RX CONTRAST MEDICATION: Performed by: RADIOLOGY

## 2021-05-25 RX ADMIN — GADOBUTROL 6 ML: 604.72 INJECTION INTRAVENOUS at 10:56

## 2021-05-25 NOTE — TELEPHONE ENCOUNTER
Upon educating the patient, she meets criteria for testing of BRCA related mutations implicated in breast and ovarian cancer. This is by virtue of her having a diagnosis of breast cancer combined with either one of the following criteria as described by NCCN guidelines:  Personal history of breast cancer at age 54, right invasive carcinoma, triple negative disease one or more of the following: Diagnosed paternal aunt breast cancer          Pre-Test Education and Risk Assessment During the patient's first visit, the patient has completed the \"Family History Questionnaire\" along with personal information pertinent to assessing risk factors. This information is used to complete the genetic assessment. Informed Consent Procedures Education along with the information guide \"Hereditary Breast and Ovarian Cancer Syndrome, A Patient's Guide to risk assessment\" is provided to the patient with additional resources listed with the information guide. Informed consent is obtained for all genetic testing, and the limitations and benefits of testing are discussed. The specific type of test to be completed, the cost of the tests, possible test results, and the implications of these results are reviewed with the individual. Written consent is obtained prior to testing. Testing  Confidentiality Standards Privacy is maintained in accordance with institutional guidelines. No patient files are coded. Information obtained is kept in a secure medical record. Any information regarding genetic testing cannot be released without the written consent of the individual.   Testing Genetic testing is coordinated and sent to in-house and outside institutions that are CAP/CLIA approved. Available Testing  Cancer/Syndrome Gene  Breast & Ovarian Cancer BRCA1, BRCA2       Blood specimen obtained with today's visit. Educational brochure given to patient to take home.     After considering the risks, benefits, and limitations, the patient

## 2021-05-25 NOTE — TELEPHONE ENCOUNTER
Left message with patient that her insurance authorized her MRI and she is scheduled Tuesday, May 25, 2021.  Informed patient to arrive at 0900 for lab draw, genetic testing prior to her MRI at 21 [phone]

## 2021-06-04 ENCOUNTER — OFFICE VISIT (OUTPATIENT)
Dept: BREAST CENTER | Age: 56
End: 2021-06-04
Payer: COMMERCIAL

## 2021-06-04 ENCOUNTER — HOSPITAL ENCOUNTER (OUTPATIENT)
Dept: GENERAL RADIOLOGY | Age: 56
Discharge: HOME | End: 2021-06-06
Payer: COMMERCIAL

## 2021-06-04 ENCOUNTER — TELEPHONE (OUTPATIENT)
Dept: CASE MANAGEMENT | Age: 56
End: 2021-06-04

## 2021-06-04 VITALS
RESPIRATION RATE: 16 BRPM | DIASTOLIC BLOOD PRESSURE: 90 MMHG | WEIGHT: 144 LBS | BODY MASS INDEX: 25.52 KG/M2 | SYSTOLIC BLOOD PRESSURE: 144 MMHG | TEMPERATURE: 98.6 F | HEIGHT: 63 IN | HEART RATE: 81 BPM | OXYGEN SATURATION: 98 %

## 2021-06-04 DIAGNOSIS — C50.911 INVASIVE DUCTAL CARCINOMA OF RIGHT BREAST (HCC): ICD-10-CM

## 2021-06-04 DIAGNOSIS — Z17.1 MALIGNANT NEOPLASM OF UPPER-OUTER QUADRANT OF RIGHT BREAST IN FEMALE, ESTROGEN RECEPTOR NEGATIVE (HCC): Primary | ICD-10-CM

## 2021-06-04 DIAGNOSIS — C50.411 MALIGNANT NEOPLASM OF UPPER-OUTER QUADRANT OF RIGHT BREAST IN FEMALE, ESTROGEN RECEPTOR NEGATIVE (HCC): Primary | ICD-10-CM

## 2021-06-04 PROCEDURE — 99245 OFF/OP CONSLTJ NEW/EST HI 55: CPT | Performed by: SURGERY

## 2021-06-04 PROCEDURE — 99203 OFFICE O/P NEW LOW 30 MIN: CPT | Performed by: SURGERY

## 2021-06-04 PROCEDURE — 71046 X-RAY EXAM CHEST 2 VIEWS: CPT

## 2021-06-04 ASSESSMENT — ENCOUNTER SYMPTOMS
ANAL BLEEDING: 0
NAUSEA: 0
BACK PAIN: 1
COUGH: 0
DIARRHEA: 0
VOMITING: 0
ABDOMINAL PAIN: 0
RESPIRATORY NEGATIVE: 1
BLOOD IN STOOL: 0
ALLERGIC/IMMUNOLOGIC NEGATIVE: 1
CONSTIPATION: 0
ABDOMINAL DISTENTION: 0
GASTROINTESTINAL NEGATIVE: 1
EYES NEGATIVE: 1
SHORTNESS OF BREATH: 0

## 2021-06-04 NOTE — PROGRESS NOTES
[facility]  HISTORY & PHYSICAL  ATTENDING NOTE    Patient's Name/Date of Birth: Tj Cordova / 1965    Date: 2021     Chief Complaint   Patient presents with    Consultation     NEW BREAST CANCER:  Right breast - Invasive ductal carcinoma - ER(-) [place](-) HER2(-)  -- imaging/biopsy [facility] - Dr Uri Medrano Results     Pt had labs, genetics testing and Breast MRI done prior to appt. Tj Cordova presents for evaluation of a mammographic abnormality. PCP: Analia Escobar DO. Gynecologist: none. Referred by:  Dr. Analia Escobar    The mammogram was performed at [facility] on 2021. The patient has not noted a change in BSE since presentation. Patient denies nipple discharge. Patient denies a personal history of breast cancer. Breast cancer risk factors include infrequent SMEs and age and gender. Ashkenazi Evangelical Ancestry: No.    OBSTETRIC RELATED HISTORY:  Age of menarche was 15. Age of menopause was 39. Hysterectomy and unilateral oophorectomy  Patient denies hormonal therapy. Patient is . Age of first live birth was N/A. Patient did not breast feed. Is patient interested in fertility information about fertility preservation? No    CANCER SURVEILLANCE HISTORY:  Mammograms: Yes   Breast MRI's: Yes   Breast Biopsies: Yes --2 biopsies left side  Colonoscopy: No   GI Polyps: Not Applicable   EGD: No   Pelvic Exam: Yes 10 y ago  Pap Smear: Yes   Dermatology: No   Lung screening: no  H/O DVT:  no  H/O Radiation:  no        Estimated body mass index is 25.51 kg/m² as calculated from the following:    Height as of this encounter: 5' 3\" (1.6 m). Weight as of this encounter: 144 lb (65.3 kg). Bra Size: 34C    Because violence is so common, we ask all our patients: are you in a relationship or do you live with a person who threatens, hurts, or controls you:  no    Patient drinks moderate caffeinated beverages. Patient does not smoke cigarettes. Patient does not use recreational drugs. Past Medical History:   Diagnosis Date    Anxiety     Depression     Hypothyroidism        Past Surgical History:   Procedure Laterality Date    BREAST BIOPSY Left     PARTIAL HYSTERECTOMY Left     Left ovary gone due to endometriosis - has cervix and right ovary    US BREAST NEEDLE BIOPSY RIGHT Right 5/6/2021    US BREAST NEEDLE BIOPSY RIGHT 5/6/2021 SHEREE GUADARRAMA [facility]       Current Outpatient Medications   Medication Sig Dispense Refill    levothyroxine (SYNTHROID) 50 MCG tablet Take 1 tablet by mouth Daily 90 tablet 3    azelastine (ASTELIN) 0.1 % nasal spray 1 spray by Nasal route 2 times daily Use in each nostril as directed 2 Bottle 1    vitamin B-12 (CYANOCOBALAMIN) 100 MCG tablet Take 50 mcg by mouth daily      cetirizine (ZYRTEC) 10 MG tablet Take 10 mg by mouth daily       No current facility-administered medications for this visit.        Family History   Problem Relation Age of Onset    Cervical Cancer Sister     Uterine Cancer Sister         unsure     Ashkenazi Yazidism Ancestry: No    Allergies   Allergen Reactions    Oxycodone-Acetaminophen Other (See Comments)       Social History     Socioeconomic History    Marital status:      Spouse name: Not on file    Number of children: Not on file    Years of education: Not on file    Highest education level: Not on file   Occupational History    Not on file   Tobacco Use    Smoking status: Former Smoker     Packs/day: 1.00     Years: 10.00     Pack years: 10.00     Types: Cigarettes    Smokeless tobacco: Never Used   Substance and Sexual Activity    Alcohol use: Not Currently     Comment: margaret     Drug use: Never    Sexual activity: Not on file   Other Topics Concern    Not on file   Social History Narrative    Not on file     Social Determinants of Health     Financial Resource Strain:     Difficulty of Paying Living Expenses:    Food Insecurity:     Worried About Running Out of Porous Power in the Last Year:    [address] in the Last Year:    Transportation Needs:     Lack of Transportation (Medical):  Lack of Transportation (Non-Medical):    Physical Activity:     Days of Exercise per Week:     Minutes of Exercise per Session:    Stress:     Feeling of Stress :    Social Connections:     Frequency of Communication with Friends and Family:     Frequency of Social Gatherings with Friends and Family:     Attends Taoism Services:     Active Member of Clubs or Organizations:     Attends Club or Organization Meetings:     Marital Status:    Intimate Partner Violence:     Fear of Current or Ex-Partner:     Emotionally Abused:     Physically Abused:     Sexually Abused:        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 Negative for activity change, appetite change and unexpected weight change. HENT: Negative. Eyes: Negative. Respiratory: Negative. Negative for cough and shortness of breath. Cardiovascular: Negative. Negative for chest pain and leg swelling. Gastrointestinal: Negative. Negative for abdominal distention, abdominal pain, anal bleeding, blood in stool, constipation, diarrhea, nausea and vomiting. Endocrine: Negative. Genitourinary: Negative. Musculoskeletal: Positive for back pain (chronic). Negative for arthralgias, gait problem, joint swelling and myalgias. Skin: Negative. Allergic/Immunologic: Negative. Neurological: Negative. Negative for dizziness, weakness and headaches. Hematological: Negative. Psychiatric/Behavioral: Negative. Negative for confusion, decreased concentration and sleep disturbance. ECOG PS:  0  Covid vaccination? Yes Pfizer  Flu Vaccination?  Yes    BP (!) 144/90 (Site: Left Upper Arm, Position: Sitting, Cuff Size: Large Adult)   Pulse 81   Temp 98.6 °F (37 °C) (Infrared)   Resp 16   Ht 5' 3\" (1.6 m)   Wt 144 lb (65.3 kg)   SpO2 98%   BMI 25.51 kg/m²   Physical which are grouped.  A   focal asymmetry is also noted in the upper outer quadrant.       Right diagnostic ultrasound: Targeted right breast ultrasound was performed   utilizing high-resolution, real-time scanning.  There is a complex cystic and   solid mass in the right breast 9 o'clock 5 cm from the nipple measuring 8 x 6   x 8 mm.  It has an irregular shape with angular margins.  No axillary   lymphadenopathy seen. .           Impression   1.  Complex cystic and solid mass in the right breast 9 o'clock is suspicious   for neoplasm.       2.  Scattered punctate, amorphous, and round calcifications in the upper   outer right breast are benign.       Recommendation:       Ultrasound-guided core biopsy of the suspicious complex cystic and solid mass   in the right breast 9 o'clock is recommended.       BIRADS:   BIRADS - CATEGORY 4     MRI BREAST:  EXAMINATION:   MRI OF THE BILATERAL BREASTS WITHOUT AND WITH CONTRAST 5/25/2021 9:58 am:       TECHNIQUE:   Multiplanar, multisequence MRI of the bilateral breasts was performed without   and with the administration of intravenous contrast.       Data analysis was performed with color parametric mapping, image subtraction,   and 3D reconstructions.       COMPARISON:   Multiple prior studies, the most recent dated May 6, 2021.       HISTORY:   ORDERING SYSTEM PROVIDED HISTORY: Malignant neoplasm of right breast in   female, estrogen receptor negative, unspecified site of breast ([facility] [address]   TECHNOLOGIST PROVIDED HISTORY:   Reason for exam:->to determine the extent of disease,       FINDINGS:   There is heterogeneous fibroglandular tissue with minimal background   parenchymal enhancement.  An irregular, enhancing mass is again noted in the   right breast 9 o'clock which measures 1.1 x 0.8 x 0.9 cm (image 58 of the   axial T1 post contrast series).  There is adjacent focal, heterogeneous non   mass enhancement located along the medial, lateral, anterior, and superior   aspects of the primary lesion.  Total extent of disease is approximately 3.3   x 2.5 x 5.0 cm.  No suspicious mass or non mass enhancement seen on the left. There is no lymphadenopathy.  Bilateral skin and nipple-areolar complexes are   normal.  Visualized chest and abdominal organs are unremarkable.           Impression   1.  Multifocal neoplasm in the lower outer quadrant of the right breast.       2.  There is no lymphadenopathy.       3.  No evidence of neoplasm in the left breast       RECOMMENDATION:       Continued surgical and oncologic consultation is advised.       BIRADS:   6: Known neoplasm     PATHOLOGY:  Diagnosis:   Right breast, 9:00, core biopsy: Invasive ductal carcinoma, grade 2. Breast Cancer Marker Studies:   Estrogen Receptors (ER): Negative         Internal control cells present and stain as expected: Yes     Progesterone Receptors (MD): Negative         Internal control cells present and stain as expected: Yes     Hormone receptor studies are performed by immunohistochemistry on   formalin-fixed, paraffin-embedded tissue (Roche Benchmark Immunostainer,   Mancos anti-ER clone [id number], anti-MD clone [id number], polymer-based detection   chemistry). ER and MD are evaluated based on the percentage of cells   showing nuclear staining with >1% considered positive for each. Her-2/alex (c-erb B-2) protein expression: Negative (0)     ASSESSMENT/PLAN:  Right breast cancer--TNBC, clinical prognostic stage IB, grade 2; likely DCIS component based on MRI findings  --CBC, CMP, CXR done and reviewed  --genetic testing results pending  --refer to oncology  --likely a candidate for neoadjuvant chemo  --discussed lumpectomy with [facility] when time for surgery.       I spent 84 minutes personally with the patient/family/staff/resident(s) in examination, chart and imaging review, discussing natural history and prognosis, differential diagnosis, risks and benefits of treatment and instructions of which more than 50% of the time was spent for counseling and coordinating care.         Leticia Lou MD, MSc, FACS  6/4/2021  1:29 PM

## 2021-06-04 NOTE — TELEPHONE ENCOUNTER
Met with patient  regarding her recent breast cancer diagnosis at  surgical consultation appointment. Instructed on next steps including breast surgery options per Dr. Singh Elizondo recommendations. Patient had been sent information previously [facility]. Provided patient with \"Be A Survivor: Your guide to breast cancer treatment\", chapter 4 reviewed. Patient also given Patient Resource booklet on Triple Negative Breast Cancer. Patient given opportunity to ask questions. Provided patient with my contact information, and encouragement to call me with questions or concerns. Patient verbalizes understanding and appreciative of nurse navigator visit.

## 2021-06-04 NOTE — LETTER
[facility] Breast  [address] [address]  Phone: [phone]  Fax: Shanice Huerta MD      June 4, 2021     Patient: Ivan Carrion   MR Number: [id number]   YOB: 1965   Date of Visit: 6/4/2021       Dear Dr. Valarie Howell: Thank you for referring Jelena Keller to me for evaluation/treatment. Below are the relevant portions of my assessment and plan of care. Lam Noahns has been referred to Dr. Lesly Resendez from oncology as she is likely a candidate for neoadjuvant chemotherapy. If you have questions, please do not hesitate to call me. I look forward to following Lam Sheba along with you.     Sincerely,        Nicole Ravi MD    CC providers:  Brenna Mares DO  [address] [place] [id number]  [address]ia [employer]

## 2021-06-09 ENCOUNTER — TELEPHONE (OUTPATIENT)
Dept: BREAST CENTER | Age: 56
End: 2021-06-09

## 2021-06-09 NOTE — TELEPHONE ENCOUNTER
I spoke with Harriett Adams in reference to her [facility] genetic analysis. The results are negative. No clinically significant mutation identified. I explained the meaning of the results and informed her that I will notify Dr. Sabra Fonseca (breast surgery) and Dr. Shashank Garcia (medical oncology) and a copy will go to the Radiation Oncologist when and if appropriate. I will mail her copy along with the [facility] Understanding your results booklet. Consultation with Dr. Amina Sheriff scheduled on 6/17/21.

## 2021-06-16 ENCOUNTER — TELEPHONE (OUTPATIENT)
Dept: SURGERY | Age: 56
End: 2021-06-16

## 2021-06-17 NOTE — TELEPHONE ENCOUNTER
MA contacted [facility] and spoke to [place]. Patient is being seen today 06/17/2021, pt has a chemo teach on 06/21/2021 then to have labs see the Dr and treatment on 06/24/2021.       Electronically signed by Flaquita Pickering MA on 6/17/21 at 9:00 AM EDT

## 2021-06-18 ENCOUNTER — PREP FOR PROCEDURE (OUTPATIENT)
Dept: SURGERY | Age: 56
End: 2021-06-18

## 2021-06-18 ENCOUNTER — TELEPHONE (OUTPATIENT)
Dept: BREAST CENTER | Age: 56
End: 2021-06-18

## 2021-06-18 RX ORDER — SODIUM CHLORIDE 9 MG/ML
25 INJECTION, SOLUTION INTRAVENOUS PRN
Status: CANCELLED | OUTPATIENT
Start: 2021-06-18

## 2021-06-18 RX ORDER — SODIUM CHLORIDE 0.9 % (FLUSH) 0.9 %
10 SYRINGE (ML) INJECTION PRN
Status: CANCELLED | OUTPATIENT
Start: 2021-06-18

## 2021-06-18 RX ORDER — SODIUM CHLORIDE 0.9 % (FLUSH) 0.9 %
10 SYRINGE (ML) INJECTION EVERY 12 HOURS SCHEDULED
Status: CANCELLED | OUTPATIENT
Start: 2021-06-18

## 2021-06-18 RX ORDER — SODIUM CHLORIDE 9 MG/ML
INJECTION, SOLUTION INTRAVENOUS CONTINUOUS
Status: CANCELLED | OUTPATIENT
Start: 2021-06-18

## 2021-06-18 NOTE — TELEPHONE ENCOUNTER
GUNNAR Levy called and spoke to [facility] and scheduled PT for left sided medi-port placment on 2021 @ 7:30am with Dr. Janelle Gonzalez. PT is not taking ASA/blood thinner products. PT has received both COVID 19 vaccines, advised to bring card to surgery with her. MA reached patient VM and left prep instructions, and NPO after midnight. MA also left appointment date/time, as well as to arrive 1.5 hours prior to procedure. PT advised PAT will call to go over all medication and advise on where to park and enter the hospital at for procedure. PT has been told to make sure they have a ride to and from procedure as they are not allowed to drive after procedure. MA instructed PT to call the office at [phone] with any questions, comments, or concerns about the procedure. Prior Authorization Form:      DEMOGRAPHICS:                     Patient Name:  Thea Alston  Patient :  1965            Insurance:  Payor: Juan Fierro / Plan: Juan Fierro / Product Type: Indemnity /   Insurance ID Number:    Payor/Plan Subscr  Sex Relation Sub. Ins. ID Effective Group Num   1. GENERIC COMMEKaron Dbter 1965 Female Self [id number]                                   [address], [place]  [place] [id number]   2.  AMERIBEN - Bradley DUMONT 1965 Female Self [id number]                                   [address]         DIAGNOSIS & PROCEDURE:                       Procedure/Operation: medi-port placment           CPT Code: 32409    Diagnosis:  Breast CA    ICD10 Code: P88.871    Location:  [facility]    Surgeon:  Ramirez Pulido INFORMATION:                          Date: 2021    Time: 7:30am              Anesthesia:  MAC/TIVA                                                       Status:  Outpatient        Special Comments:  N/A       Electronically signed by Nohemi Morin MA on 2021 at 2:17 PM

## 2021-06-18 NOTE — H&P (VIEW-ONLY)
[facility]  HISTORY & PHYSICAL  ATTENDING NOTE     Patient's Name/Date of Birth: Keron Sebastian / 1965     Date: 2021           Chief Complaint   Patient presents with    Consultation       NEW BREAST CANCER:  Right breast - Invasive ductal carcinoma - ER(-) CA(-) HER2(-)  -- imaging/biopsy [facility] - Dr Everardo Houston Results       Pt had labs, genetics testing and Breast MRI done prior to appt.          Keron Sebastian presents for evaluation of a mammographic abnormality.     PCP: Amie Morgan DO. Gynecologist: none.     Referred by:  Dr. Amie Morgan     The mammogram was performed at [facility] on 2021. The patient has not noted a change in BSE since presentation. Patient denies nipple discharge. Patient denies a personal history of breast cancer. Breast cancer risk factors include infrequent SMEs and age and gender. Ashkenazi Yazidi Ancestry: No.     OBSTETRIC RELATED HISTORY:  Age of menarche was 15. Age of menopause was 39. Hysterectomy and unilateral oophorectomy  Patient denies hormonal therapy. Patient is . Age of first live birth was N/A. Patient did not breast feed. Is patient interested in fertility information about fertility preservation? No     CANCER SURVEILLANCE HISTORY:  Mammograms: Yes   Breast MRI's: Yes   Breast Biopsies: Yes --2 biopsies left side  Colonoscopy: No   GI Polyps: Not Applicable   EGD: No   Pelvic Exam: Yes 10 y ago  Pap Smear: Yes   Dermatology: No   Lung screening: no  H/O DVT:  no  H/O Radiation:  no           Estimated body mass index is 25.51 kg/m² as calculated from the following:    Height as of this encounter: 5' 3\" (1.6 m). Weight as of this encounter: 144 lb (65.3 kg). Bra Size: 34C     Because violence is so common, we ask all our patients: are you in a relationship or do you live with a person who threatens, hurts, or controls you:  no     Patient drinks moderate caffeinated beverages.  Patient does not smoke cigarettes.  Patient does not use recreational drugs.        Past Medical History        Past Medical History:   Diagnosis Date    Anxiety      Depression      Hypothyroidism              Past Surgical History         Past Surgical History:   Procedure Laterality Date    BREAST BIOPSY Left      PARTIAL HYSTERECTOMY Left       Left ovary gone due to endometriosis - has cervix and right ovary    US BREAST NEEDLE BIOPSY RIGHT Right 5/6/2021     US BREAST NEEDLE BIOPSY RIGHT 5/6/2021 [facility]            Current Facility-Administered Medications   Current Outpatient Medications   Medication Sig Dispense Refill    levothyroxine (SYNTHROID) 50 MCG tablet Take 1 tablet by mouth Daily 90 tablet 3    azelastine (ASTELIN) 0.1 % nasal spray 1 spray by Nasal route 2 times daily Use in each nostril as directed 2 Bottle 1    vitamin B-12 (CYANOCOBALAMIN) 100 MCG tablet Take 50 mcg by mouth daily        cetirizine (ZYRTEC) 10 MG tablet Take 10 mg by mouth daily          No current facility-administered medications for this visit.            Family History         Family History   Problem Relation Age of Onset    Cervical Cancer Sister      Uterine Cancer Sister           unsure         Ashkenazi Judaism Ancestry: No          Allergies   Allergen Reactions    Oxycodone-Acetaminophen Other (See Comments)         Social History               Socioeconomic History    Marital status:        Spouse name: Not on file    Number of children: Not on file    Years of education: Not on file    Highest education level: Not on file   Occupational History    Not on file   Tobacco Use    Smoking status: Former Smoker       Packs/day: 1.00       Years: 10.00       Pack years: 10.00       Types: Cigarettes    Smokeless tobacco: Never Used   Substance and Sexual Activity    Alcohol use: Not Currently       Comment: margaret     Drug use: Never    Sexual activity: Not on file   Other Topics Concern    Not on file   Social History Narrative    Not on file      Social Determinants of Health          Financial Resource Strain:     Difficulty of Paying Living Expenses:    Food Insecurity:     Worried About Running Out of Food in the Last Year:     [address] in the Last Year:    Transportation Needs:     Lack of Transportation (Medical):  Lack of Transportation (Non-Medical):    Physical Activity:     Days of Exercise per Week:     Minutes of Exercise per Session:    Stress:     Feeling of Stress :    Social Connections:     Frequency of Communication with Friends and Family:     Frequency of Social Gatherings with Friends and Family:     Attends Church Services:     Active Member of Clubs or Organizations:     Attends Club or Organization Meetings:     Marital Status:    Intimate Partner Violence:     Fear of Current or Ex-Partner:     Emotionally Abused:     Physically Abused:     Sexually Abused:             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 Negative for activity change, appetite change and unexpected weight change. HENT: Negative. Eyes: Negative. Respiratory: Negative. Negative for cough and shortness of breath. Cardiovascular: Negative. Negative for chest pain and leg swelling. Gastrointestinal: Negative. Negative for abdominal distention, abdominal pain, anal bleeding, blood in stool, constipation, diarrhea, nausea and vomiting. Endocrine: Negative. Genitourinary: Negative. Musculoskeletal: Positive for back pain (chronic). Negative for arthralgias, gait problem, joint swelling and myalgias. Skin: Negative. Allergic/Immunologic: Negative. Neurological: Negative. Negative for dizziness, weakness and headaches. Hematological: Negative. Psychiatric/Behavioral: Negative. Negative for confusion, decreased concentration and sleep disturbance.         ECOG PS:  0  Covid vaccination? Yes Pfizer  Flu Vaccination? Yes     BP (!) 144/90 (Site: Left Upper Arm, Position: Sitting, Cuff Size: Large Adult)   Pulse 81   Temp 98.6 °F (37 °C) (Infrared)   Resp 16   Ht 5' 3\" (1.6 m)   Wt 144 lb (65.3 kg)   SpO2 98%   BMI 25.51 kg/m²   Physical Exam  Constitutional:       Appearance: Normal appearance. HENT:      Head: Normocephalic and atraumatic. Nose: Nose normal.      Mouth/Throat:      Mouth: Mucous membranes are moist.      Pharynx: Oropharynx is clear. Eyes:      Extraocular Movements: Extraocular movements intact. Pupils: Pupils are equal, round, and reactive to light. Cardiovascular:      Rate and Rhythm: Normal rate and regular rhythm. Pulses: Normal pulses. Heart sounds: Normal heart sounds. Pulmonary:      Effort: Pulmonary effort is normal.      Breath sounds: Normal breath sounds. Chest:      Breasts:         Right: No swelling, bleeding, inverted nipple, mass, nipple discharge, skin change or tenderness. Left: No swelling, bleeding, inverted nipple, mass, nipple discharge, skin change or tenderness. Abdominal:      General: There is no distension. Palpations: Abdomen is soft. Tenderness: There is no abdominal tenderness. Musculoskeletal:         General: No tenderness or signs of injury. Cervical back: Normal range of motion and neck supple. Lymphadenopathy:      Cervical:      Right cervical: No superficial cervical adenopathy. Left cervical: No superficial cervical adenopathy. Upper Body:      Right upper body: No supraclavicular or axillary adenopathy. Left upper body: No supraclavicular or axillary adenopathy. Skin:     General: Skin is warm and dry. Neurological:      General: No focal deficit present. Mental Status: She is alert and oriented to person, place, and time. Psychiatric:         Mood and Affect: Mood normal.         Behavior: Behavior normal.         Thought Content:  Thought content normal.         Judgment: Abnormal mammogram   TECHNOLOGIST PROVIDED HISTORY:   Per [facility] protocol   Reason for exam:->right breast calcifications       FINDINGS:   Right diagnostic mammogram: Breast tissue is heterogeneously dense which may   obscure small masses.  There are scattered punctate, amorphous, and round   calcifications in the upper outer right breast, none of which are grouped.  A   focal asymmetry is also noted in the upper outer quadrant.       Right diagnostic ultrasound: Targeted right breast ultrasound was performed   utilizing high-resolution, real-time scanning.  There is a complex cystic and   solid mass in the right breast 9 o'clock 5 cm from the nipple measuring 8 x 6   x 8 mm.  It has an irregular shape with angular margins.  No axillary   lymphadenopathy seen. .           Impression   1.  Complex cystic and solid mass in the right breast 9 o'clock is suspicious   for neoplasm.       2.  Scattered punctate, amorphous, and round calcifications in the upper   outer right breast are benign.       Recommendation:       Ultrasound-guided core biopsy of the suspicious complex cystic and solid mass   in the right breast 9 o'clock is recommended.       BIRADS:   BIRADS - CATEGORY 4       Suspicious Abnormality.  Biopsy should be considered at this time.               ULTRASOUND:  EXAMINATION:   DIAGNOSTIC DIGITAL RIGHT BREAST MAMMOGRAM; TARGETED ULTRASOUND OF THE RIGHT   BREAST, 4/22/2021 2:57 pm       TECHNIQUE:   Diagnostic mammography of the right breast was performed.  Computer aided   detection was utilized in the interpretation of this exam.; Targeted   ultrasound of the right breast was performed.       Views: Right cc spot magnification and right mL spot magnification views       COMPARISON:   Multiple prior studies, the most recent dated April 14, 2021       HISTORY:   ORDERING SYSTEM PROVIDED HISTORY: Abnormal mammogram   TECHNOLOGIST PROVIDED HISTORY:   Per [facility] protocol   Reason for exam:->right breast calcifications       FINDINGS:   Right diagnostic mammogram: Breast tissue is heterogeneously dense which may   obscure small masses.  There are scattered punctate, amorphous, and round   calcifications in the upper outer right breast, none of which are grouped.  A   focal asymmetry is also noted in the upper outer quadrant.       Right diagnostic ultrasound: Targeted right breast ultrasound was performed   utilizing high-resolution, real-time scanning.  There is a complex cystic and   solid mass in the right breast 9 o'clock 5 cm from the nipple measuring 8 x 6   x 8 mm.  It has an irregular shape with angular margins.  No axillary   lymphadenopathy seen. .           Impression   1.  Complex cystic and solid mass in the right breast 9 o'clock is suspicious   for neoplasm.       2.  Scattered punctate, amorphous, and round calcifications in the upper   outer right breast are benign.       Recommendation:       Ultrasound-guided core biopsy of the suspicious complex cystic and solid mass   in the right breast 9 o'clock is recommended.       BIRADS:   BIRADS - CATEGORY 4      MRI BREAST:  EXAMINATION:   MRI OF THE BILATERAL BREASTS WITHOUT AND WITH CONTRAST 5/25/2021 9:58 am:       TECHNIQUE:   Multiplanar, multisequence MRI of the bilateral breasts was performed without   and with the administration of intravenous contrast.       Data analysis was performed with color parametric mapping, image subtraction,   and 3D reconstructions.       COMPARISON:   Multiple prior studies, the most recent dated May 6, 2021.       HISTORY:   ORDERING SYSTEM PROVIDED HISTORY: Malignant neoplasm of right breast in   female, estrogen receptor negative, unspecified site of breast ([facility] [address]   TECHNOLOGIST PROVIDED HISTORY:   Reason for exam:->to determine the extent of disease,       FINDINGS:   There is heterogeneous fibroglandular tissue with minimal background   parenchymal enhancement.  An irregular, enhancing mass is again noted in the right breast 9 o'clock which measures 1.1 x 0.8 x 0.9 cm (image 58 of the   axial T1 post contrast series).  There is adjacent focal, heterogeneous non   mass enhancement located along the medial, lateral, anterior, and superior   aspects of the primary lesion.  Total extent of disease is approximately 3.3   x 2.5 x 5.0 cm.  No suspicious mass or non mass enhancement seen on the left. There is no lymphadenopathy.  Bilateral skin and nipple-areolar complexes are   normal.  Visualized chest and abdominal organs are unremarkable.           Impression   1.  Multifocal neoplasm in the lower outer quadrant of the right breast.       2.  There is no lymphadenopathy.       3.  No evidence of neoplasm in the left breast       RECOMMENDATION:       Continued surgical and oncologic consultation is advised.       BIRADS:   6: Known neoplasm      PATHOLOGY:  Diagnosis:   Right breast, 9:00, core biopsy: Invasive ductal carcinoma, grade 2. Breast Cancer Marker Studies:   Estrogen Receptors (ER): Negative         Internal control cells present and stain as expected: Yes     Progesterone Receptors (FL): Negative         Internal control cells present and stain as expected: Yes     Hormone receptor studies are performed by immunohistochemistry on   formalin-fixed, paraffin-embedded tissue (Roche Benchmark Immunostainer,   [place] anti-ER clone [id number], anti-FL clone [id number], polymer-based detection   chemistry). ER and FL are evaluated based on the percentage of cells   showing nuclear staining with >1% considered positive for each.      Her-2/alex (c-erb B-2) protein expression: Negative (0)      ASSESSMENT/PLAN:  Right breast cancer--TNBC, clinical prognostic stage IB, grade 2; likely DCIS component based on MRI findings  --CBC, CMP, CXR done and reviewed  --genetic testing results pending  --refer to oncology  --likely a candidate for neoadjuvant chemo  --discussed lumpectomy with [facility] when time for surgery.       I spent 84 minutes personally with the patient/family/staff/resident(s) in examination, chart and imaging review, discussing natural history and prognosis, differential diagnosis, risks and benefits of treatment and instructions of which more than 50% of the time was spent for counseling and coordinating care.  Abhishek Fuentes MD, MSc, FACS  6/4/2021  1:29 PM

## 2021-06-18 NOTE — H&P
[facility]  HISTORY & PHYSICAL  ATTENDING NOTE     Patient's Name/Date of Birth: Keron Sebastian / 1965     Date: 2021           Chief Complaint   Patient presents with    Consultation       NEW BREAST CANCER:  Right breast - Invasive ductal carcinoma - ER(-) SD(-) HER2(-)  -- imaging/biopsy [facility] - Dr Everardo Houston Results       Pt had labs, genetics testing and Breast MRI done prior to appt.          Keron Sebastian presents for evaluation of a mammographic abnormality.     PCP: Amie Morgan DO. Gynecologist: none.     Referred by:  Dr. Amie Morgan     The mammogram was performed at [facility] on 2021. The patient has not noted a change in BSE since presentation. Patient denies nipple discharge. Patient denies a personal history of breast cancer. Breast cancer risk factors include infrequent SMEs and age and gender. Ashkenazi Evangelical Ancestry: No.     OBSTETRIC RELATED HISTORY:  Age of menarche was 15. Age of menopause was 39. Hysterectomy and unilateral oophorectomy  Patient denies hormonal therapy. Patient is . Age of first live birth was N/A. Patient did not breast feed. Is patient interested in fertility information about fertility preservation? No     CANCER SURVEILLANCE HISTORY:  Mammograms: Yes   Breast MRI's: Yes   Breast Biopsies: Yes --2 biopsies left side  Colonoscopy: No   GI Polyps: Not Applicable   EGD: No   Pelvic Exam: Yes 10 y ago  Pap Smear: Yes   Dermatology: No   Lung screening: no  H/O DVT:  no  H/O Radiation:  no           Estimated body mass index is 25.51 kg/m² as calculated from the following:    Height as of this encounter: 5' 3\" (1.6 m). Weight as of this encounter: 144 lb (65.3 kg). Bra Size: 34C     Because violence is so common, we ask all our patients: are you in a relationship or do you live with a person who threatens, hurts, or controls you:  no     Patient drinks moderate caffeinated beverages.  Patient does not smoke cigarettes.  Patient does not use recreational drugs.        Past Medical History        Past Medical History:   Diagnosis Date    Anxiety      Depression      Hypothyroidism              Past Surgical History         Past Surgical History:   Procedure Laterality Date    BREAST BIOPSY Left      PARTIAL HYSTERECTOMY Left       Left ovary gone due to endometriosis - has cervix and right ovary    US BREAST NEEDLE BIOPSY RIGHT Right 5/6/2021     US BREAST NEEDLE BIOPSY RIGHT 5/6/2021 [facility]            Current Facility-Administered Medications   Current Outpatient Medications   Medication Sig Dispense Refill    levothyroxine (SYNTHROID) 50 MCG tablet Take 1 tablet by mouth Daily 90 tablet 3    azelastine (ASTELIN) 0.1 % nasal spray 1 spray by Nasal route 2 times daily Use in each nostril as directed 2 Bottle 1    vitamin B-12 (CYANOCOBALAMIN) 100 MCG tablet Take 50 mcg by mouth daily        cetirizine (ZYRTEC) 10 MG tablet Take 10 mg by mouth daily          No current facility-administered medications for this visit.            Family History         Family History   Problem Relation Age of Onset    Cervical Cancer Sister      Uterine Cancer Sister           unsure         Ashkenazi Mormon Ancestry: No          Allergies   Allergen Reactions    Oxycodone-Acetaminophen Other (See Comments)         Social History               Socioeconomic History    Marital status:        Spouse name: Not on file    Number of children: Not on file    Years of education: Not on file    Highest education level: Not on file   Occupational History    Not on file   Tobacco Use    Smoking status: Former Smoker       Packs/day: 1.00       Years: 10.00       Pack years: 10.00       Types: Cigarettes    Smokeless tobacco: Never Used   Substance and Sexual Activity    Alcohol use: Not Currently       Comment: margaret     Drug use: Never    Sexual activity: Not on file   Other Topics Concern    Not on file   Social History Narrative    Not on file      Social Determinants of Health          Financial Resource Strain:     Difficulty of Paying Living Expenses:    Food Insecurity:     Worried About Running Out of Food in the Last Year:     [address] in the Last Year:    Transportation Needs:     Lack of Transportation (Medical):  Lack of Transportation (Non-Medical):    Physical Activity:     Days of Exercise per Week:     Minutes of Exercise per Session:    Stress:     Feeling of Stress :    Social Connections:     Frequency of Communication with Friends and Family:     Frequency of Social Gatherings with Friends and Family:     Attends Alevism Services:     Active Member of Clubs or Organizations:     Attends Club or Organization Meetings:     Marital Status:    Intimate Partner Violence:     Fear of Current or Ex-Partner:     Emotionally Abused:     Physically Abused:     Sexually Abused:             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 Negative for activity change, appetite change and unexpected weight change. HENT: Negative. Eyes: Negative. Respiratory: Negative. Negative for cough and shortness of breath. Cardiovascular: Negative. Negative for chest pain and leg swelling. Gastrointestinal: Negative. Negative for abdominal distention, abdominal pain, anal bleeding, blood in stool, constipation, diarrhea, nausea and vomiting. Endocrine: Negative. Genitourinary: Negative. Musculoskeletal: Positive for back pain (chronic). Negative for arthralgias, gait problem, joint swelling and myalgias. Skin: Negative. Allergic/Immunologic: Negative. Neurological: Negative. Negative for dizziness, weakness and headaches. Hematological: Negative. Psychiatric/Behavioral: Negative. Negative for confusion, decreased concentration and sleep disturbance.         ECOG PS:  0  Covid vaccination? Yes Pfizer  Flu Vaccination? Yes     BP (!) 144/90 (Site: Left Upper Arm, Position: Sitting, Cuff Size: Large Adult)   Pulse 81   Temp 98.6 °F (37 °C) (Infrared)   Resp 16   Ht 5' 3\" (1.6 m)   Wt 144 lb (65.3 kg)   SpO2 98%   BMI 25.51 kg/m²   Physical Exam  Constitutional:       Appearance: Normal appearance. HENT:      Head: Normocephalic and atraumatic. Nose: Nose normal.      Mouth/Throat:      Mouth: Mucous membranes are moist.      Pharynx: Oropharynx is clear. Eyes:      Extraocular Movements: Extraocular movements intact. Pupils: Pupils are equal, round, and reactive to light. Cardiovascular:      Rate and Rhythm: Normal rate and regular rhythm. Pulses: Normal pulses. Heart sounds: Normal heart sounds. Pulmonary:      Effort: Pulmonary effort is normal.      Breath sounds: Normal breath sounds. Chest:      Breasts:         Right: No swelling, bleeding, inverted nipple, mass, nipple discharge, skin change or tenderness. Left: No swelling, bleeding, inverted nipple, mass, nipple discharge, skin change or tenderness. Abdominal:      General: There is no distension. Palpations: Abdomen is soft. Tenderness: There is no abdominal tenderness. Musculoskeletal:         General: No tenderness or signs of injury. Cervical back: Normal range of motion and neck supple. Lymphadenopathy:      Cervical:      Right cervical: No superficial cervical adenopathy. Left cervical: No superficial cervical adenopathy. Upper Body:      Right upper body: No supraclavicular or axillary adenopathy. Left upper body: No supraclavicular or axillary adenopathy. Skin:     General: Skin is warm and dry. Neurological:      General: No focal deficit present. Mental Status: She is alert and oriented to person, place, and time. Psychiatric:         Mood and Affect: Mood normal.         Behavior: Behavior normal.         Thought Content:  Thought content normal.         Judgment: Abnormal mammogram   TECHNOLOGIST PROVIDED HISTORY:   Per [facility] protocol   Reason for exam:->right breast calcifications       FINDINGS:   Right diagnostic mammogram: Breast tissue is heterogeneously dense which may   obscure small masses.  There are scattered punctate, amorphous, and round   calcifications in the upper outer right breast, none of which are grouped.  A   focal asymmetry is also noted in the upper outer quadrant.       Right diagnostic ultrasound: Targeted right breast ultrasound was performed   utilizing high-resolution, real-time scanning.  There is a complex cystic and   solid mass in the right breast 9 o'clock 5 cm from the nipple measuring 8 x 6   x 8 mm.  It has an irregular shape with angular margins.  No axillary   lymphadenopathy seen. .           Impression   1.  Complex cystic and solid mass in the right breast 9 o'clock is suspicious   for neoplasm.       2.  Scattered punctate, amorphous, and round calcifications in the upper   outer right breast are benign.       Recommendation:       Ultrasound-guided core biopsy of the suspicious complex cystic and solid mass   in the right breast 9 o'clock is recommended.       BIRADS:   BIRADS - CATEGORY 4       Suspicious Abnormality.  Biopsy should be considered at this time.               ULTRASOUND:  EXAMINATION:   DIAGNOSTIC DIGITAL RIGHT BREAST MAMMOGRAM; TARGETED ULTRASOUND OF THE RIGHT   BREAST, 4/22/2021 2:57 pm       TECHNIQUE:   Diagnostic mammography of the right breast was performed.  Computer aided   detection was utilized in the interpretation of this exam.; Targeted   ultrasound of the right breast was performed.       Views: Right cc spot magnification and right mL spot magnification views       COMPARISON:   Multiple prior studies, the most recent dated April 14, 2021       HISTORY:   ORDERING SYSTEM PROVIDED HISTORY: Abnormal mammogram   TECHNOLOGIST PROVIDED HISTORY:   Per [facility] protocol   Reason for exam:->right breast calcifications       FINDINGS:   Right diagnostic mammogram: Breast tissue is heterogeneously dense which may   obscure small masses.  There are scattered punctate, amorphous, and round   calcifications in the upper outer right breast, none of which are grouped.  A   focal asymmetry is also noted in the upper outer quadrant.       Right diagnostic ultrasound: Targeted right breast ultrasound was performed   utilizing high-resolution, real-time scanning.  There is a complex cystic and   solid mass in the right breast 9 o'clock 5 cm from the nipple measuring 8 x 6   x 8 mm.  It has an irregular shape with angular margins.  No axillary   lymphadenopathy seen. .           Impression   1.  Complex cystic and solid mass in the right breast 9 o'clock is suspicious   for neoplasm.       2.  Scattered punctate, amorphous, and round calcifications in the upper   outer right breast are benign.       Recommendation:       Ultrasound-guided core biopsy of the suspicious complex cystic and solid mass   in the right breast 9 o'clock is recommended.       BIRADS:   BIRADS - CATEGORY 4      MRI BREAST:  EXAMINATION:   MRI OF THE BILATERAL BREASTS WITHOUT AND WITH CONTRAST 5/25/2021 9:58 am:       TECHNIQUE:   Multiplanar, multisequence MRI of the bilateral breasts was performed without   and with the administration of intravenous contrast.       Data analysis was performed with color parametric mapping, image subtraction,   and 3D reconstructions.       COMPARISON:   Multiple prior studies, the most recent dated May 6, 2021.       HISTORY:   ORDERING SYSTEM PROVIDED HISTORY: Malignant neoplasm of right breast in   female, estrogen receptor negative, unspecified site of breast ([facility] [address]   TECHNOLOGIST PROVIDED HISTORY:   Reason for exam:->to determine the extent of disease,       FINDINGS:   There is heterogeneous fibroglandular tissue with minimal background   parenchymal enhancement.  An irregular, enhancing mass is again noted in the right breast 9 o'clock which measures 1.1 x 0.8 x 0.9 cm (image 58 of the   axial T1 post contrast series).  There is adjacent focal, heterogeneous non   mass enhancement located along the medial, lateral, anterior, and superior   aspects of the primary lesion.  Total extent of disease is approximately 3.3   x 2.5 x 5.0 cm.  No suspicious mass or non mass enhancement seen on the left. There is no lymphadenopathy.  Bilateral skin and nipple-areolar complexes are   normal.  Visualized chest and abdominal organs are unremarkable.           Impression   1.  Multifocal neoplasm in the lower outer quadrant of the right breast.       2.  There is no lymphadenopathy.       3.  No evidence of neoplasm in the left breast       RECOMMENDATION:       Continued surgical and oncologic consultation is advised.       BIRADS:   6: Known neoplasm      PATHOLOGY:  Diagnosis:   Right breast, 9:00, core biopsy: Invasive ductal carcinoma, grade 2. Breast Cancer Marker Studies:   Estrogen Receptors (ER): Negative         Internal control cells present and stain as expected: Yes     Progesterone Receptors (FL): Negative         Internal control cells present and stain as expected: Yes     Hormone receptor studies are performed by immunohistochemistry on   formalin-fixed, paraffin-embedded tissue (Roche Benchmark Immunostainer,   [place] anti-ER clone [id number], anti-FL clone [id number], polymer-based detection   chemistry). ER and FL are evaluated based on the percentage of cells   showing nuclear staining with >1% considered positive for each.      Her-2/alex (c-erb B-2) protein expression: Negative (0)      ASSESSMENT/PLAN:  Right breast cancer--TNBC, clinical prognostic stage IB, grade 2; likely DCIS component based on MRI findings  --CBC, CMP, CXR done and reviewed  --genetic testing results pending  --refer to oncology  --likely a candidate for neoadjuvant chemo  --discussed lumpectomy with [facility] when time for surgery.       I spent 84 minutes personally with the patient/family/staff/resident(s) in examination, chart and imaging review, discussing natural history and prognosis, differential diagnosis, risks and benefits of treatment and instructions of which more than 50% of the time was spent for counseling and coordinating care.  Ray Dean MD, MSc, FACS  6/4/2021  1:29 PM

## 2021-06-18 NOTE — PROGRESS NOTES
[facility] PRE-ADMISSION TESTING GENERAL INSTRUCTIONS- [facility]-phone number:[phone]    GENERAL INSTRUCTIONS  [x] Antibacterial Soap shower Night before and/or AM of Surgery  [] Jerzy wipe instruction sheet and wipes given. [x] Nothing by mouth after midnight, including gum, candy, mints, or water. [x] You may brush your teeth, gargle, but do NOT swallow water. []Hibiclens shower  the night before and the morning of surgery. Do not use             Hibiclens on your face or head. [x]No smoking, chewing tobacco, illegal drugs, or alcohol within 24 hours of your surgery. [x] Jewelry, valuables or body piercing's should not be brought to the hospital. All body and/or tongue piercing's must be removed prior to arriving to hospital.  ALL hair pins must be removed. [x] Do not wear makeup, lotions, powders, deodorant. Nail polish as directed by the nurse. [x] Arrange transportation with a responsible adult  to and from the hospital. If you do not have a responsible adult  to transport you, you will need to make arrangements with a medical transportation company (i.e. [employer]. A Uber/taxi/bus is not appropriate unless you are accompanied by a responsible adult ). Arrange for someone to be with you for the remainder of the day and for 24 hours after your procedure due to having had anesthesia. Who will be your  for transportation?_____HUSBAND, DARREN_____________   Who will be staying with you for 24 hrs after your procedure?____________DARREN______  [x] Bring insurance card and photo ID.  [] Transfusion Bracelet: Please bring with you to hospital, day of surgery  [] Bring urine specimen day of surgery. Any small container is acceptable. [] Use inhalers the morning of surgery and bring with you to hospital.  [] Bring copy of living will or healthcare power of  papers to be placed in your electronic record.   [] CPAP/BI-PAP: Please bring your machine if you are to spend the night in the hospital.     PARKING INSTRUCTIONS:   [x] Arrival Time:___0530_________  · [x] Parking lot '\"I\"  is located on [facility] (the corner of [facility] and [facility]). To enter, press the button and the gate will lift. A free token will be provided to exit the lot. One car per patient is allowed to park in this lot. All other cars are to park on [address] [place] [address] either in the parking garage or the handicap lot. [] To reach the [facility] lobby from [address] [place] [address], upon entering the hospital, take elevator B to the 3rd floor. EDUCATION INSTRUCTIONS:      [] Knee or hip replacement booklet & exercise pamphlets given. [] [address] placed in chart. [] Pre-admission Testing educational folder given  [x] Incentive Spirometry,coughing & deep breathing exercises reviewed. []Medication information sheet(s)   [x]Fluoroscopy-Xray used in surgery reviewed with patient. Educational pamphlet placed in chart. []Pain: Post-op pain is normal and to be expected. You will be asked to rate your pain from 0-10(a zero is not acceptable-education is needed). Your post-op pain goal is:  [x] Ask your nurse for your pain medication. [] Joint camp offered. [] Joint replacement booklets given. [] Other:___________________________    MEDICATION INSTRUCTIONS:   [x]Bring a complete list of your medications, please write the last time you took the medicine, give this list to the nurse.   [] Take the following medications the morning of surgery with 1-2 ounces of water:   [x] Stop herbal supplements and vitamins 5 days before your surgery. [] DO NOT take any diabetic medicine the morning of surgery. Follow instructions for insulin the day before surgery. [] If you are diabetic and your blood sugar is low or you feel symptomatic, you may drink 1-2 ounces of apple juice or take a glucose tablet.   The morning of your procedure, you may call the pre-op area if you have concerns about your blood sugar [phone]. [] Use your inhalers the morning of surgery. Bring your emergency inhaler with you day of surgery. [x] Follow physician instructions regarding any blood thinners you may be taking. WHAT TO EXPECT:  [x] The day of surgery you will be greeted and checked in by the [employer].  In addition, you will be registered in the [facility] by a Patient Access Representative. Please bring your photo ID and insurance card. A nurse will greet you in accordance to the time you are needed in the pre-op area to prepare you for surgery. Please do not be discouraged if you are not greeted in the order you arrive as there are many variables that are involved in patient preparation. Your patience is greatly appreciated as you wait for your nurse. Please bring in items such as: books, magazines, newspapers, electronics, or any other items  to occupy your time in the waiting area. [x]  Delays may occur with surgery and staff will make a sincere effort to keep you informed of delays. If any delays occur with your procedure, we apologize ahead of time for your inconvenience as we recognize the value of your time.

## 2021-06-20 ENCOUNTER — ANESTHESIA EVENT (OUTPATIENT)
Dept: OPERATING ROOM | Age: 56
End: 2021-06-20
Payer: COMMERCIAL

## 2021-06-21 ENCOUNTER — APPOINTMENT (OUTPATIENT)
Dept: GENERAL RADIOLOGY | Age: 56
End: 2021-06-21
Attending: SURGERY
Payer: COMMERCIAL

## 2021-06-21 ENCOUNTER — HOSPITAL ENCOUNTER (OUTPATIENT)
Age: 56
Setting detail: OUTPATIENT SURGERY
Discharge: HOME | End: 2021-06-21
Attending: SURGERY | Admitting: SURGERY
Payer: COMMERCIAL

## 2021-06-21 ENCOUNTER — ANESTHESIA (OUTPATIENT)
Dept: OPERATING ROOM | Age: 56
End: 2021-06-21
Payer: COMMERCIAL

## 2021-06-21 VITALS
WEIGHT: 144 LBS | SYSTOLIC BLOOD PRESSURE: 135 MMHG | TEMPERATURE: 97.7 F | HEIGHT: 63 IN | OXYGEN SATURATION: 97 % | RESPIRATION RATE: 18 BRPM | DIASTOLIC BLOOD PRESSURE: 68 MMHG | BODY MASS INDEX: 25.52 KG/M2 | HEART RATE: 76 BPM

## 2021-06-21 VITALS — SYSTOLIC BLOOD PRESSURE: 101 MMHG | DIASTOLIC BLOOD PRESSURE: 58 MMHG | OXYGEN SATURATION: 76 %

## 2021-06-21 DIAGNOSIS — G89.18 POSTOPERATIVE PAIN: Primary | ICD-10-CM

## 2021-06-21 PROCEDURE — 2500000003 HC RX 250 WO HCPCS

## 2021-06-21 PROCEDURE — 77001 FLUOROGUIDE FOR VEIN DEVICE: CPT | Performed by: SURGERY

## 2021-06-21 PROCEDURE — 2709999900 HC NON-CHARGEABLE SUPPLY: Performed by: SURGERY

## 2021-06-21 PROCEDURE — 7100000010 HC PHASE II RECOVERY - FIRST 15 MIN: Performed by: SURGERY

## 2021-06-21 PROCEDURE — 3700000001 HC ADD 15 MINUTES (ANESTHESIA): Performed by: SURGERY

## 2021-06-21 PROCEDURE — C1788 PORT, INDWELLING, IMP: HCPCS | Performed by: SURGERY

## 2021-06-21 PROCEDURE — 2580000003 HC RX 258

## 2021-06-21 PROCEDURE — 3700000000 HC ANESTHESIA ATTENDED CARE: Performed by: SURGERY

## 2021-06-21 PROCEDURE — 6360000002 HC RX W HCPCS

## 2021-06-21 PROCEDURE — 2580000003 HC RX 258: Performed by: SURGERY

## 2021-06-21 PROCEDURE — 71045 X-RAY EXAM CHEST 1 VIEW: CPT

## 2021-06-21 PROCEDURE — 77001 FLUOROGUIDE FOR VEIN DEVICE: CPT

## 2021-06-21 PROCEDURE — 3600000003 HC SURGERY LEVEL 3 BASE: Performed by: SURGERY

## 2021-06-21 PROCEDURE — 7100000011 HC PHASE II RECOVERY - ADDTL 15 MIN: Performed by: SURGERY

## 2021-06-21 PROCEDURE — 3600000013 HC SURGERY LEVEL 3 ADDTL 15MIN: Performed by: SURGERY

## 2021-06-21 PROCEDURE — 2500000003 HC RX 250 WO HCPCS: Performed by: SURGERY

## 2021-06-21 PROCEDURE — 6360000002 HC RX W HCPCS: Performed by: SURGERY

## 2021-06-21 PROCEDURE — 3209999900 FLUORO FOR SURGICAL PROCEDURES

## 2021-06-21 PROCEDURE — 36561 INSERT TUNNELED CV CATH: CPT | Performed by: SURGERY

## 2021-06-21 DEVICE — PORT INFUS 8FR PWR INJ CT FOR VASC ACCS CATH: Type: IMPLANTABLE DEVICE | Site: CHEST | Status: FUNCTIONAL

## 2021-06-21 RX ORDER — TRAMADOL HYDROCHLORIDE 50 MG/1
50 TABLET ORAL EVERY 6 HOURS PRN
Qty: 6 TABLET | Refills: 0 | Status: SHIPPED | OUTPATIENT
Start: 2021-06-21 | End: 2021-06-24

## 2021-06-21 RX ORDER — PROPOFOL 10 MG/ML
INJECTION, EMULSION INTRAVENOUS CONTINUOUS PRN
Status: DISCONTINUED | OUTPATIENT
Start: 2021-06-21 | End: 2021-06-21 | Stop reason: SDUPTHER

## 2021-06-21 RX ORDER — MIDAZOLAM HYDROCHLORIDE 1 MG/ML
INJECTION INTRAMUSCULAR; INTRAVENOUS PRN
Status: DISCONTINUED | OUTPATIENT
Start: 2021-06-21 | End: 2021-06-21 | Stop reason: SDUPTHER

## 2021-06-21 RX ORDER — FENTANYL CITRATE 50 UG/ML
INJECTION, SOLUTION INTRAMUSCULAR; INTRAVENOUS PRN
Status: DISCONTINUED | OUTPATIENT
Start: 2021-06-21 | End: 2021-06-21 | Stop reason: SDUPTHER

## 2021-06-21 RX ORDER — SODIUM CHLORIDE 9 MG/ML
INJECTION, SOLUTION INTRAVENOUS CONTINUOUS PRN
Status: DISCONTINUED | OUTPATIENT
Start: 2021-06-21 | End: 2021-06-21 | Stop reason: SDUPTHER

## 2021-06-21 RX ORDER — PHENYLEPHRINE HCL IN 0.9% NACL 1 MG/10 ML
SYRINGE (ML) INTRAVENOUS PRN
Status: DISCONTINUED | OUTPATIENT
Start: 2021-06-21 | End: 2021-06-21 | Stop reason: SDUPTHER

## 2021-06-21 RX ORDER — SODIUM CHLORIDE 9 MG/ML
INJECTION, SOLUTION INTRAVENOUS CONTINUOUS
Status: DISCONTINUED | OUTPATIENT
Start: 2021-06-21 | End: 2021-06-21 | Stop reason: HOSPADM

## 2021-06-21 RX ORDER — SODIUM CHLORIDE 0.9 % (FLUSH) 0.9 %
10 SYRINGE (ML) INJECTION PRN
Status: DISCONTINUED | OUTPATIENT
Start: 2021-06-21 | End: 2021-06-21 | Stop reason: HOSPADM

## 2021-06-21 RX ORDER — HEPARIN SODIUM (PORCINE) LOCK FLUSH IV SOLN 100 UNIT/ML 100 UNIT/ML
SOLUTION INTRAVENOUS PRN
Status: DISCONTINUED | OUTPATIENT
Start: 2021-06-21 | End: 2021-06-21 | Stop reason: ALTCHOICE

## 2021-06-21 RX ORDER — BUPIVACAINE HYDROCHLORIDE 2.5 MG/ML
INJECTION, SOLUTION EPIDURAL; INFILTRATION; INTRACAUDAL PRN
Status: DISCONTINUED | OUTPATIENT
Start: 2021-06-21 | End: 2021-06-21 | Stop reason: ALTCHOICE

## 2021-06-21 RX ORDER — SODIUM CHLORIDE 0.9 % (FLUSH) 0.9 %
10 SYRINGE (ML) INJECTION EVERY 12 HOURS SCHEDULED
Status: DISCONTINUED | OUTPATIENT
Start: 2021-06-21 | End: 2021-06-21 | Stop reason: HOSPADM

## 2021-06-21 RX ORDER — SODIUM CHLORIDE 9 MG/ML
25 INJECTION, SOLUTION INTRAVENOUS PRN
Status: DISCONTINUED | OUTPATIENT
Start: 2021-06-21 | End: 2021-06-21 | Stop reason: HOSPADM

## 2021-06-21 RX ADMIN — Medication 100 MCG: at 07:55

## 2021-06-21 RX ADMIN — FENTANYL CITRATE 50 MCG: 50 INJECTION, SOLUTION INTRAMUSCULAR; INTRAVENOUS at 07:41

## 2021-06-21 RX ADMIN — FENTANYL CITRATE 50 MCG: 50 INJECTION, SOLUTION INTRAMUSCULAR; INTRAVENOUS at 07:25

## 2021-06-21 RX ADMIN — Medication 2000 MG: at 07:32

## 2021-06-21 RX ADMIN — SODIUM CHLORIDE: 9 INJECTION, SOLUTION INTRAVENOUS at 06:10

## 2021-06-21 RX ADMIN — MIDAZOLAM 2 MG: 1 INJECTION INTRAMUSCULAR; INTRAVENOUS at 07:25

## 2021-06-21 RX ADMIN — PROPOFOL 125 MCG/KG/MIN: 10 INJECTION, EMULSION INTRAVENOUS at 07:31

## 2021-06-21 RX ADMIN — SODIUM CHLORIDE: 9 INJECTION, SOLUTION INTRAVENOUS at 06:14

## 2021-06-21 ASSESSMENT — PULMONARY FUNCTION TESTS
PIF_VALUE: 14
PIF_VALUE: 13
PIF_VALUE: 13
PIF_VALUE: 14
PIF_VALUE: 13
PIF_VALUE: 14
PIF_VALUE: 5
PIF_VALUE: 14
PIF_VALUE: 13
PIF_VALUE: 14
PIF_VALUE: 13
PIF_VALUE: 14
PIF_VALUE: 13
PIF_VALUE: 14
PIF_VALUE: 13

## 2021-06-21 ASSESSMENT — PAIN - FUNCTIONAL ASSESSMENT: PAIN_FUNCTIONAL_ASSESSMENT: 0-10

## 2021-06-21 ASSESSMENT — PAIN SCALES - GENERAL
PAINLEVEL_OUTOF10: 0

## 2021-06-21 NOTE — ANESTHESIA POSTPROCEDURE EVALUATION
Department of Anesthesiology  Postprocedure Note    Patient: Luis Enrique Engle  MRN: [id number]  YOB: 1965  Date of evaluation: 6/21/2021  Time:  10:58 AM     Procedure Summary     Date: 06/21/21 Room / Location: [place] [address] / [facility]    Anesthesia Start: 0725 Anesthesia Stop:     Procedure: MEDI PORT INSERTION LEFT SIDE PLACEMENT (Left ) Diagnosis: (BREAST CANCER)    Surgeons: Hector Hammer MD Responsible Provider: Inderjit Barrera MD    Anesthesia Type: MAC ASA Status: 2          Anesthesia Type: MAC    Dhiraj Phase I: Dhiraj Score: 10    Dhiraj Phase II: Dhiraj Score: 9    Last vitals: Reviewed and per EMR flowsheets.        Anesthesia Post Evaluation    Patient location during evaluation: PACU  Patient participation: complete - patient participated  Level of consciousness: awake  Pain score: 3  Airway patency: patent  Nausea & Vomiting: no nausea and no vomiting  Complications: no  Cardiovascular status: blood pressure returned to baseline  Respiratory status: acceptable  Hydration status: euvolemic

## 2021-06-21 NOTE — ANESTHESIA PRE PROCEDURE
Department of Anesthesiology  Preprocedure Note       Name:  Kimberly Delaney   Age:  64 y.o.  :  1965                                          MRN:  [id number]         Date:  2021      Surgeon: Josefina Omer):  Giovanna Rose MD    Procedure: Procedure(s):  MEDI PORT INSERTION LEFT SIDE PLACEMENT    Medications prior to admission:   Prior to Admission medications    Medication Sig Start Date End Date Taking? Authorizing Provider   levothyroxine (SYNTHROID) 50 MCG tablet Take 1 tablet by mouth Daily 1/15/21  Yes Dina Presley,    azelastine (ASTELIN) 0.1 % nasal spray 1 spray by Nasal route 2 times daily Use in each nostril as directed 20  Yes Kelli Burton DO   vitamin B-12 (CYANOCOBALAMIN) 100 MCG tablet Take 50 mcg by mouth daily   Yes Historical Provider, MD   cetirizine (ZYRTEC) 10 MG tablet Take 10 mg by mouth daily   Yes Historical Provider, MD       Current medications:    Current Facility-Administered Medications   Medication Dose Route Frequency Provider Last Rate Last Admin    0.9 % sodium chloride infusion   Intravenous Continuous Giovanna Rose  mL/hr at 21 0614 New Bag at 21 0614    0.9 % sodium chloride infusion  25 mL Intravenous PRN Giovanna Rose MD        ceFAZolin (ANCEF) 2000 mg in sterile water 20 mL IV syringe  2,000 mg Intravenous On Call to [address], MD        sodium chloride flush 0.9 % injection 10 mL  10 mL Intravenous 2 times per day Giovanna Rose MD        sodium chloride flush 0.9 % injection 10 mL  10 mL Intravenous PRN Giovanna Rose MD         Facility-Administered Medications Ordered in Other Encounters   Medication Dose Route Frequency Provider Last Rate Last Admin    0.9 % sodium chloride infusion   Intravenous Continuous PRN Margoth Mcdonnell RN   New Bag at 21 5726       Allergies:     Allergies   Allergen Reactions    Oxycodone-Acetaminophen Other (See Comments)       Problem List:    Patient Active Problem List   Diagnosis Code    Acquired hypothyroidism E03.9    Malignant neoplasm of upper-outer quadrant of right breast in female, [facility] C50.411, Z17.1       Past Medical History:        Diagnosis Date    Anxiety     Depression     Hypothyroidism        Past Surgical History:        Procedure Laterality Date    BREAST BIOPSY Left     PARTIAL HYSTERECTOMY Left     Left ovary gone due to endometriosis - has cervix and right ovary    US BREAST NEEDLE BIOPSY RIGHT Right 5/6/2021    US BREAST NEEDLE BIOPSY RIGHT 5/6/2021 [facility]       Social History:    Social History     Tobacco Use    Smoking status: Former Smoker     Packs/day: 1.00     Years: 10.00     Pack years: 10.00     Types: Cigarettes    Smokeless tobacco: Never Used   Substance Use Topics    Alcohol use: Not Currently     Comment: margaret                                 Counseling given: Not Answered      Vital Signs (Current):   Vitals:    06/18/21 1416 06/21/21 0553   BP:  (!) 165/75   Pulse:  71   Resp:  20   Temp:  36.4 °C (97.6 °F)   TempSrc:  Temporal   SpO2:  99%   Weight: 144 lb (65.3 kg) 144 lb (65.3 kg)   Height: 5' 3\" (1.6 m) 5' 3\" (1.6 m)                                              BP Readings from Last 3 Encounters:   06/21/21 (!) 165/75   06/04/21 (!) 144/90   01/15/21 116/78       NPO Status: Time of last liquid consumption: 2200                        Time of last solid consumption: 2200                        Date of last liquid consumption: 06/20/21                        Date of last solid food consumption: 06/20/21    BMI:   Wt Readings from Last 3 Encounters:   06/21/21 144 lb (65.3 kg)   06/04/21 144 lb (65.3 kg)   01/15/21 145 lb (65.8 kg)     Body mass index is 25.51 kg/m².     CBC:   Lab Results   Component Value Date    WBC 6.5 05/25/2021    RBC 4.57 05/25/2021    HGB 12.8 05/25/2021    HCT 40.0 05/25/2021    MCV 87.5 05/25/2021    RDW 13.4 05/25/2021     05/25/2021       CMP: Lab Results   Component Value Date     05/25/2021    K 4.3 05/25/2021     05/25/2021    CO2 27 05/25/2021    BUN 12 05/25/2021    CREATININE 0.7 05/25/2021    GFRAA >60 05/25/2021    LABGLOM >60 05/25/2021    GLUCOSE 96 05/25/2021    PROT 7.7 05/25/2021    CALCIUM 9.4 05/25/2021    BILITOT 0.4 05/25/2021    ALKPHOS 67 05/25/2021    AST 13 05/25/2021    ALT 10 05/25/2021       POC Tests: No results for input(s): POCGLU, POCNA, POCK, POCCL, POCBUN, POCHEMO, POCHCT in the last 72 hours. Coags: No results found for: PROTIME, INR, APTT    HCG (If Applicable): No results found for: PREGTESTUR, PREGSERUM, HCG, HCGQUANT     ABGs: No results found for: PHART, PO2ART, NDB3LIO, AMC8TWL, BEART, C9ZYIVBL     Type & Screen (If Applicable):  No results found for: LABABO, LABRH    Drug/Infectious Status (If Applicable):  No results found for: HIV, HEPCAB    COVID-19 Screening (If Applicable): No results found for: COVID19        Anesthesia Evaluation  Patient summary reviewed and Nursing notes reviewed no history of anesthetic complications:   Airway: Mallampati: III  TM distance: >3 FB   Neck ROM: full  Mouth opening: < 3 FB Dental: normal exam     Comment: Denies loose missing or chipped teeth     Pulmonary:Negative Pulmonary ROS and normal exam                               Cardiovascular:Negative CV ROS             Beta Blocker:  Not on Beta Blocker         Neuro/Psych:   (+) psychiatric history: stable with treatmentdepression/anxiety             GI/Hepatic/Renal: Neg GI/Hepatic/Renal ROS            Endo/Other:    (+) hypothyroidism::., .                 Abdominal:           Vascular: negative vascular ROS. Anesthesia Plan      MAC     ASA 2       Induction: intravenous. Anesthetic plan and risks discussed with patient. Use of blood products discussed with patient whom consented to blood products. Plan discussed with CRNA and attending.                 Caren Torres,

## 2021-06-21 NOTE — PROGRESS NOTES
COVID testing not done. Patient is fully vaccinated. Results of the test: na  The patient verbally confirms that they did adhere to the self-quarantine guidelines. No signs or symptoms expressed or observed.

## 2021-06-21 NOTE — INTERVAL H&P NOTE
Update History & Physical    The patient's History and Physical of June 4, 2021 was reviewed with the patient and I examined the patient. There was no change. The surgical site was confirmed by the patient and me. Plan: The risks, benefits, expected outcome, and alternative to the recommended procedure have been discussed with the patient. Patient understands and wants to proceed with the procedure.      Electronically signed by Milind De Jesus MD on 6/21/2021 at 7:22 AM

## 2021-06-23 ENCOUNTER — TELEPHONE (OUTPATIENT)
Dept: BREAST CENTER | Age: 56
End: 2021-06-23

## 2021-06-23 NOTE — TELEPHONE ENCOUNTER
Meghan called from the [facility]. [place] is scheduled to begin her chemotherapy tomorrow, 06/24/2021. She was calling to confirm her Mediport has been placed. Confirmed Mediport placement on 06/21/2021 per Dr. Abimael Saini. Steven Rodríguez, RN, MSN, APRN-CNP, [address]  Advanced Oncology Certified Nurse Practitioner  Department of Breast Surgery  [facility] [place]/  Care in collaboration with Dr. Nathaly Ariza.  Neha/Dr. Kaylah Duval APRN-CNP

## 2021-08-13 ENCOUNTER — APPOINTMENT (OUTPATIENT)
Dept: GENERAL RADIOLOGY | Age: 56
End: 2021-08-13
Payer: COMMERCIAL

## 2021-08-13 ENCOUNTER — APPOINTMENT (OUTPATIENT)
Dept: CT IMAGING | Age: 56
End: 2021-08-13
Payer: COMMERCIAL

## 2021-08-13 ENCOUNTER — OFFICE VISIT (OUTPATIENT)
Dept: BREAST CENTER | Age: 56
End: 2021-08-13
Payer: COMMERCIAL

## 2021-08-13 ENCOUNTER — HOSPITAL ENCOUNTER (EMERGENCY)
Age: 56
Discharge: HOME | End: 2021-08-13
Attending: STUDENT IN AN ORGANIZED HEALTH CARE EDUCATION/TRAINING PROGRAM
Payer: COMMERCIAL

## 2021-08-13 VITALS
WEIGHT: 145.2 LBS | BODY MASS INDEX: 25.73 KG/M2 | SYSTOLIC BLOOD PRESSURE: 102 MMHG | DIASTOLIC BLOOD PRESSURE: 68 MMHG | TEMPERATURE: 97 F | OXYGEN SATURATION: 98 % | HEIGHT: 63 IN | HEART RATE: 106 BPM

## 2021-08-13 VITALS
HEART RATE: 78 BPM | WEIGHT: 145 LBS | RESPIRATION RATE: 18 BRPM | OXYGEN SATURATION: 99 % | SYSTOLIC BLOOD PRESSURE: 110 MMHG | DIASTOLIC BLOOD PRESSURE: 72 MMHG | BODY MASS INDEX: 25.69 KG/M2 | TEMPERATURE: 96.8 F | HEIGHT: 63 IN

## 2021-08-13 DIAGNOSIS — Z17.1 MALIGNANT NEOPLASM OF UPPER-OUTER QUADRANT OF RIGHT BREAST IN FEMALE, ESTROGEN RECEPTOR NEGATIVE (HCC): ICD-10-CM

## 2021-08-13 DIAGNOSIS — C50.411 MALIGNANT NEOPLASM OF UPPER-OUTER QUADRANT OF RIGHT BREAST IN FEMALE, ESTROGEN RECEPTOR NEGATIVE (HCC): ICD-10-CM

## 2021-08-13 DIAGNOSIS — R55 SYNCOPE AND COLLAPSE: Primary | ICD-10-CM

## 2021-08-13 LAB
ALBUMIN SERPL-MCNC: 4.2 G/DL (ref 3.5–5.2)
ALP BLD-CCNC: 155 U/L (ref 35–104)
ALT SERPL-CCNC: 10 U/L (ref 0–32)
ANION GAP SERPL CALCULATED.3IONS-SCNC: 8 MMOL/L (ref 7–16)
AST SERPL-CCNC: 12 U/L (ref 0–31)
BASOPHILS ABSOLUTE: 0 E9/L (ref 0–0.2)
BASOPHILS RELATIVE PERCENT: 0.1 % (ref 0–2)
BILIRUB SERPL-MCNC: 0.5 MG/DL (ref 0–1.2)
BILIRUBIN URINE: NEGATIVE
BLOOD, URINE: NEGATIVE
BUN BLDV-MCNC: 15 MG/DL (ref 6–20)
CALCIUM SERPL-MCNC: 8.7 MG/DL (ref 8.6–10.2)
CHLORIDE BLD-SCNC: 100 MMOL/L (ref 98–107)
CLARITY: CLEAR
CO2: 27 MMOL/L (ref 22–29)
COLOR: YELLOW
CREAT SERPL-MCNC: 0.8 MG/DL (ref 0.5–1)
D DIMER: 463 NG/ML DDU
EKG ATRIAL RATE: 85 BPM
EKG P AXIS: 40 DEGREES
EKG P-R INTERVAL: 118 MS
EKG Q-T INTERVAL: 380 MS
EKG QRS DURATION: 68 MS
EKG QTC CALCULATION (BAZETT): 452 MS
EKG R AXIS: 75 DEGREES
EKG T AXIS: 58 DEGREES
EKG VENTRICULAR RATE: 85 BPM
EOSINOPHILS ABSOLUTE: 0.49 E9/L (ref 0.05–0.5)
EOSINOPHILS RELATIVE PERCENT: 2.6 % (ref 0–6)
GFR AFRICAN AMERICAN: >60
GFR NON-AFRICAN AMERICAN: >60 ML/MIN/1.73
GLUCOSE BLD-MCNC: 108 MG/DL (ref 74–99)
GLUCOSE URINE: NEGATIVE MG/DL
HCT VFR BLD CALC: 36.6 % (ref 34–48)
HEMOGLOBIN: 11.9 G/DL (ref 11.5–15.5)
KETONES, URINE: NEGATIVE MG/DL
LEUKOCYTE ESTERASE, URINE: NEGATIVE
LYMPHOCYTES ABSOLUTE: 0.38 E9/L (ref 1.5–4)
LYMPHOCYTES RELATIVE PERCENT: 1.7 % (ref 20–42)
MAGNESIUM: 2.3 MG/DL (ref 1.6–2.6)
MCH RBC QN AUTO: 28.8 PG (ref 26–35)
MCHC RBC AUTO-ENTMCNC: 32.5 % (ref 32–34.5)
MCV RBC AUTO: 88.6 FL (ref 80–99.9)
METAMYELOCYTES RELATIVE PERCENT: 0.9 % (ref 0–1)
MONOCYTES ABSOLUTE: 0 E9/L (ref 0.1–0.95)
MONOCYTES RELATIVE PERCENT: 0.9 % (ref 2–12)
MYELOCYTE PERCENT: 0.9 % (ref 0–0)
NEUTROPHILS ABSOLUTE: 18.05 E9/L (ref 1.8–7.3)
NEUTROPHILS RELATIVE PERCENT: 93.9 % (ref 43–80)
NITRITE, URINE: NEGATIVE
PDW BLD-RTO: 15.3 FL (ref 11.5–15)
PH UA: 5 (ref 5–9)
PLATELET # BLD: 173 E9/L (ref 130–450)
PMV BLD AUTO: 9 FL (ref 7–12)
POTASSIUM SERPL-SCNC: 4.5 MMOL/L (ref 3.5–5)
PROTEIN UA: NEGATIVE MG/DL
RBC # BLD: 4.13 E12/L (ref 3.5–5.5)
RBC # BLD: NORMAL 10*6/UL
SODIUM BLD-SCNC: 135 MMOL/L (ref 132–146)
SPECIFIC GRAVITY UA: >=1.03 (ref 1–1.03)
TOTAL PROTEIN: 7 G/DL (ref 6.4–8.3)
TROPONIN, HIGH SENSITIVITY: <6 NG/L (ref 0–9)
UROBILINOGEN, URINE: 0.2 E.U./DL
WBC # BLD: 18.8 E9/L (ref 4.5–11.5)

## 2021-08-13 PROCEDURE — 81003 URINALYSIS AUTO W/O SCOPE: CPT

## 2021-08-13 PROCEDURE — 93005 ELECTROCARDIOGRAM TRACING: CPT | Performed by: NURSE PRACTITIONER

## 2021-08-13 PROCEDURE — 99284 EMERGENCY DEPT VISIT MOD MDM: CPT

## 2021-08-13 PROCEDURE — 71046 X-RAY EXAM CHEST 2 VIEWS: CPT

## 2021-08-13 PROCEDURE — 6360000004 HC RX CONTRAST MEDICATION: Performed by: RADIOLOGY

## 2021-08-13 PROCEDURE — 99213 OFFICE O/P EST LOW 20 MIN: CPT | Performed by: SURGERY

## 2021-08-13 PROCEDURE — 70450 CT HEAD/BRAIN W/O DYE: CPT

## 2021-08-13 PROCEDURE — 80053 COMPREHEN METABOLIC PANEL: CPT

## 2021-08-13 PROCEDURE — 93010 ELECTROCARDIOGRAM REPORT: CPT | Performed by: INTERNAL MEDICINE

## 2021-08-13 PROCEDURE — 85025 COMPLETE CBC W/AUTO DIFF WBC: CPT

## 2021-08-13 PROCEDURE — 84484 ASSAY OF TROPONIN QUANT: CPT

## 2021-08-13 PROCEDURE — 85378 FIBRIN DEGRADE SEMIQUANT: CPT

## 2021-08-13 PROCEDURE — 71275 CT ANGIOGRAPHY CHEST: CPT

## 2021-08-13 PROCEDURE — 2580000003 HC RX 258: Performed by: NURSE PRACTITIONER

## 2021-08-13 PROCEDURE — 83735 ASSAY OF MAGNESIUM: CPT

## 2021-08-13 RX ORDER — 0.9 % SODIUM CHLORIDE 0.9 %
500 INTRAVENOUS SOLUTION INTRAVENOUS ONCE
Status: COMPLETED | OUTPATIENT
Start: 2021-08-13 | End: 2021-08-13

## 2021-08-13 RX ORDER — ONDANSETRON HYDROCHLORIDE 8 MG/1
TABLET, FILM COATED ORAL
COMMUNITY
Start: 2021-08-05 | End: 2022-01-14 | Stop reason: ALTCHOICE

## 2021-08-13 RX ORDER — DEXAMETHASONE 4 MG/1
TABLET ORAL
COMMUNITY
Start: 2021-08-05 | End: 2022-01-14 | Stop reason: ALTCHOICE

## 2021-08-13 RX ADMIN — IOPAMIDOL 75 ML: 755 INJECTION, SOLUTION INTRAVENOUS at 18:03

## 2021-08-13 RX ADMIN — SODIUM CHLORIDE 500 ML: 9 INJECTION, SOLUTION INTRAVENOUS at 13:54

## 2021-08-13 NOTE — PROGRESS NOTES
[facility]  PROGRESS NOTE  ATTENDING NOTE    Chief Complaint   Patient presents with    Breast Cancer     midway through chemo, oncologist Jax Speaks Other     Patient has no complaints today      S:  65 y/o F presents for mid-way appointment. She is not feeling well today. During conversation she passed out and appeared to be shaking. A code was called, but she regained consciousness in about 3 min and it was canceled. She was given water and ana maria crackers and started to feel better. She states she just had a small amount to drink this morning. She was diaphoretic too. We had her take her mask off and change back to her T-shirt. We had her lay down until she was feeling better. /68 (Site: Right Upper Arm, Position: Sitting, Cuff Size: Large Adult)   Pulse 106   Temp 97 °F (36.1 °C) (Infrared)   Ht 5' 3\" (1.6 m)   Wt 145 lb 3.2 oz (65.9 kg)   SpO2 98%   BMI 25.72 kg/m²   Physical Exam  Constitutional:       Appearance: Normal appearance. HENT:      Head: Normocephalic and atraumatic. Nose: Nose normal.      Mouth/Throat:      Mouth: Mucous membranes are moist.      Pharynx: Oropharynx is clear. Eyes:      Extraocular Movements: Extraocular movements intact. Pupils: Pupils are equal, round, and reactive to light. Cardiovascular:      Rate and Rhythm: Normal rate. Pulses: Normal pulses. Pulmonary:      Effort: Pulmonary effort is normal.   Chest:      Breasts:         Right: No swelling, bleeding, inverted nipple, mass, nipple discharge, skin change or tenderness. Left: No swelling, bleeding, inverted nipple, mass, nipple discharge, skin change or tenderness. Musculoskeletal:         General: No tenderness or signs of injury. Cervical back: Normal range of motion and neck supple. Lymphadenopathy:      Upper Body:      Right upper body: No supraclavicular or axillary adenopathy.       Left upper body: No supraclavicular or axillary adenopathy. Skin:     General: Skin is warm and dry. Neurological:      General: No focal deficit present. Mental Status: She is alert and oriented to person, place, and time. Psychiatric:         Mood and Affect: Mood normal.         Behavior: Behavior normal.         Thought Content: Thought content normal.         Judgment: Judgment normal.       PATHOLOGY:    Diagnosis:   Right breast, 9:00, core biopsy: Invasive ductal carcinoma, grade 2. Breast Cancer Marker Studies:   Estrogen Receptors (ER): Negative         Internal control cells present and stain as expected: Yes     Progesterone Receptors (RI): Negative         Internal control cells present and stain as expected: Yes     Hormone receptor studies are performed by immunohistochemistry on   formalin-fixed, paraffin-embedded tissue (Roche Benchmark Immunostainer,   [place] anti-ER clone [id number], anti-RI clone [id number], polymer-based detection   chemistry). ER and RI are evaluated based on the percentage of cells   showing nuclear staining with >1% considered positive for each. Her-2/alex (c-erb B-2) protein expression: Negative (0)     Her-2 studies are performed by immunohistochemistry on formalin-fixed,   paraffin-embedded tissue (Roche Benchmark Immunostainer, [place] Pathway   anti-Her-2 clone [id number], polymer-based detection chemistry). This assay is   FDA approved. Her-2 is evaluated based on the pattern of membrane staining as well as   the percentage of cancer cells showing membrane staining, using 2018   ASCO/CAP scoring criteria. Testing is performed on block A2. All controls (high protein expression,   low protein expression, negative protein expression, and internal) are   acceptable.    Percentage of tumor cells exhibiting uniform intense complete membrane   stainin%     Cold Ischemia and Fixation Times:   Meets requirements specified in latest version of the ASCO/CAP   guidelines: Yes     Comment:   The invasive carcinoma has a total Stefano histologic score   as follows: tubule formation score 3+ nuclear pleomorphism score 2+   mitotic rate score 2=7, grade 2.  The carcinoma is positive for   cytokeratin 7 and GATA3 immunostains.  Intradepartmental consultation is   obtained. MRI BREAST:  EXAMINATION:   MRI OF THE BILATERAL BREASTS WITHOUT AND WITH CONTRAST 5/25/2021 9:58 am:       TECHNIQUE:   Multiplanar, multisequence MRI of the bilateral breasts was performed without   and with the administration of intravenous contrast.       Data analysis was performed with color parametric mapping, image subtraction,   and 3D reconstructions.       COMPARISON:   Multiple prior studies, the most recent dated May 6, 2021.       HISTORY:   ORDERING SYSTEM PROVIDED HISTORY: Malignant neoplasm of right breast in   female, estrogen receptor negative, unspecified site of breast ([facility]   TECHNOLOGIST PROVIDED HISTORY:   Reason for exam:->to determine the extent of disease,       FINDINGS:   There is heterogeneous fibroglandular tissue with minimal background   parenchymal enhancement.  An irregular, enhancing mass is again noted in the   right breast 9 o'clock which measures 1.1 x 0.8 x 0.9 cm (image 58 of the   axial T1 post contrast series).  There is adjacent focal, heterogeneous non   mass enhancement located along the medial, lateral, anterior, and superior   aspects of the primary lesion.  Total extent of disease is approximately 3.3   x 2.5 x 5.0 cm.  No suspicious mass or non mass enhancement seen on the left.    There is no lymphadenopathy.  Bilateral skin and nipple-areolar complexes are   normal.  Visualized chest and abdominal organs are unremarkable.           Impression   1.  Multifocal neoplasm in the lower outer quadrant of the right breast.       2.  There is no lymphadenopathy.       3.  No evidence of neoplasm in the left breast       RECOMMENDATION:       Continued surgical and oncologic consultation is advised.       BIRADS:

## 2021-08-13 NOTE — ED NOTES
FIRST PROVIDER CONTACT ASSESSMENT NOTE      Department of Emergency Medicine   Admit Date: No admission date for patient encounter. Chief Complaint: Loss of Consciousness (syncopal episode in [facility], currently on chemo, nausea)      History of Present Illness:    Drea Newberry is a 64 y.o. female who presents to the ED for syncopal episode at the [facility] currently is on chemo for breast cancer. Complaining of nausea.   Denies any chest pain or shortness of breath.        -----------------END OF FIRST PROVIDER CONTACT ASSESSMENT NOTE--------------  Electronically signed by DILAN Landaverde CNP   DD: 8/13/21               DILAN Martinez CNP  08/13/21 1118

## 2021-08-13 NOTE — ED PROVIDER NOTES
Department of Emergency Medicine   ED  Provider Note  Admit Date/RoomTime: 8/13/2021 11:41 AM  ED Room: /          History of Present Illness:  8/13/21, Time: 11:55 AM EDT  Chief Complaint   Patient presents with    Loss of Consciousness     syncopal episode in [facility], currently on chemo, nausea        Tyson Mroa is a 64 y.o. female presenting to the ED for syncope, beginning today. The complaint has been persistent, moderate in severity, and worsened by nothing. The patient is a 60-year-old female with a history of breast cancer currently on chemotherapy who presents to the emergency department for syncopal episode. The patient symptoms were sudden in onset just prior to arrival, lasted for several seconds, it was persistent, moderate in severity, and nothing makes it better or worse. She states that she was sitting down in a chair, and she was at an appointment seeing Dr. Adria Alejo. She states that she was a little nauseated and felt lightheaded. However, she states this is not uncommon for her due to her chemotherapy. Her  was with her when this occurred and states that her eyes rolled back in her head and the doctors took her to wake her up. She states that she does feel short of breath at times which is not uncommon with her cancer diagnosis.  states he was unsure if she possibly had a seizure. She does not have any seizure history. She does not have any symptoms at this time. She denies any fever, chills, chest pain, blurry vision, double vision, numbness, tingling, unilateral weakness, abdominal pain, nausea, vomiting, diarrhea, dysuria, hematuria, hitting her head, blood thinner use, history of blood clots or bleeding disorders, recent hospitalization, recent illness, or other acute symptoms or concerns.     Review of Systems:   Pertinent positives and negatives are stated within HPI, all other systems reviewed and are negative.        --------------------------------------------- PAST HISTORY ---------------------------------------------  Past Medical History:  has a past medical history of Anxiety, Depression, and Hypothyroidism. Past Surgical History:  has a past surgical history that includes Breast biopsy (Left); partial hysterectomy (cervix not removed) (Left); US BREAST BIOPSY W LOC DEVICE 1ST LESION RIGHT (Right, 5/6/2021); and Port Surgery (Left, 6/21/2021). Social History:  reports that she has quit smoking. Her smoking use included cigarettes. She has a 10.00 pack-year smoking history. She has never used smokeless tobacco. She reports previous alcohol use. She reports that she does not use drugs. Family History: family history includes Cervical Cancer in her sister; Uterine Cancer in her sister. . Unless otherwise noted, family history is non contributory    The patients home medications have been reviewed. Allergies: Oxycodone-acetaminophen    I have reviewed the past medical history, past surgical history, social history, and family history    ---------------------------------------------------PHYSICAL EXAM--------------------------------------    Constitutional/General: Alert and oriented x3, chronically ill-appearing, no acute distress  Head: Normocephalic and atraumatic  Eyes:  EOMI, sclera non icteric  Mouth: Oropharynx clear, handling secretions, no trismus, no asymmetry of the posterior oropharynx or uvular edema  Neck: Supple, full ROM, no stridor, no meningeal signs  Respiratory: Lungs clear to auscultation bilaterally, no wheezes, rales, or rhonchi. Not in respiratory distress  Cardiovascular:  Regular rate. Regular rhythm. No murmurs, no aortic murmurs, no gallops, or rubs. Chest: No chest wall tenderness  Gastrointestinal:  Abdomen Soft, Non tender, Non distended. No rebound, guarding, or rigidity. No pulsatile masses. Musculoskeletal: Moves all extremities x 4.  Warm and well perfused, no clubbing, no cyanosis, no edema. Capillary refill <3 seconds  Skin: skin warm and dry. No rashes. Neurologic: GCS 15, no focal deficits, symmetric strength 5/5 in the upper and lower extremities bilaterally, sensation is intact and equal in the bilateral upper and lower extremities. Psychiatric: Normal Affect    -------------------------------------------------- RESULTS -------------------------------------------------  I have personally reviewed all laboratory and imaging results for this patient. Results are listed below.      LABS: (Lab results interpreted by me)  Results for orders placed or performed during the hospital encounter of 08/13/21   Troponin   Result Value Ref Range    Troponin, High Sensitivity <6 0 - 9 ng/L   CBC Auto Differential   Result Value Ref Range    WBC 18.8 (H) 4.5 - 11.5 E9/L    RBC 4.13 3.50 - 5.50 E12/L    Hemoglobin 11.9 11.5 - 15.5 g/dL    Hematocrit 36.6 34.0 - 48.0 %    MCV 88.6 80.0 - 99.9 fL    MCH 28.8 26.0 - 35.0 pg    MCHC 32.5 32.0 - 34.5 %    RDW 15.3 (H) 11.5 - 15.0 fL    Platelets 469 564 - 601 E9/L    MPV 9.0 7.0 - 12.0 fL    Neutrophils % 93.9 (H) 43.0 - 80.0 %    Lymphocytes % 1.7 (L) 20.0 - 42.0 %    Monocytes % 0.9 (L) 2.0 - 12.0 %    Eosinophils % 2.6 0.0 - 6.0 %    Basophils % 0.1 0.0 - 2.0 %    Neutrophils Absolute 18.05 (H) 1.80 - 7.30 E9/L    Lymphocytes Absolute 0.38 (L) 1.50 - 4.00 E9/L    Monocytes Absolute 0.00 (L) 0.10 - 0.95 E9/L    Eosinophils Absolute 0.49 0.05 - 0.50 E9/L    Basophils Absolute 0.00 0.00 - 0.20 E9/L    Metamyelocytes Relative 0.9 0.0 - 1.0 %    Myelocyte Percent 0.9 0 - 0 %    RBC Morphology Normal    Comprehensive Metabolic Panel   Result Value Ref Range    Sodium 135 132 - 146 mmol/L    Potassium 4.5 3.5 - 5.0 mmol/L    Chloride 100 98 - 107 mmol/L    CO2 27 22 - 29 mmol/L    Anion Gap 8 7 - 16 mmol/L    Glucose 108 (H) 74 - 99 mg/dL    BUN 15 6 - 20 mg/dL    CREATININE 0.8 0.5 - 1.0 mg/dL    GFR Non-African American >60 >=60 notes within the ED encounter and vital signs as below have been reviewed by myself  /72   Pulse 78   Temp 96.8 °F (36 °C) (Temporal)   Resp 18   Ht 5' 3\" (1.6 m)   Wt 145 lb (65.8 kg)   SpO2 99%   BMI 25.69 kg/m²     Oxygen Saturation Interpretation: 98 % on room air. Normal    The patients available past medical records and past encounters were reviewed. ------------------------------ ED COURSE/MEDICAL DECISION MAKING----------------------  Medications   0.9 % sodium chloride bolus (0 mLs Intravenous Stopped 8/13/21 1701)   iopamidol (ISOVUE-370) 76 % injection 75 mL (75 mLs Intravenous Given 8/13/21 1803)           The cardiac monitor revealed NSR with a heart rate in the 90s as interpreted by me. The cardiac monitor was ordered secondary to the patient's syncope and to monitor the patient for dysrhythmia. CPT 83003           Medical Decision Making:       The patient is a 80-year-old female who presents to the emergency department for syncope. The patient is hemodynamically stable, chronically ill-appearing, but no acute distress. She does not have any symptoms at this time. There are no focal neurological deficits. EKG is reassuring and high-sensitivity troponin was negative. Labs were otherwise within normal limits besides the elevated D-dimer of 463 and leukocytosis of 18.8. Urinalysis did not show acute infection. CT head was negative for acute normalities and CTA chest was negative for PE. Patient was given 1 L of IV fluids. She was resting comfortably and in no acute distress. She remained asymptomatic in the emergency department. There is no seizure-like activity observed and she does not have any history of seizures.  states he does not think that she had a seizure today when she passed out. Recommended her to follow-up with her family doctor. She is to return if worsening symptoms or other acute symptoms or concerns.   She verbalized understanding agreement to treatment plan and discharge instructions. Re-Evaluations:       Patient was resting comfortably and in no acute distress on reassessment. This patient's ED course included: a personal history and physicial examination, re-evaluation prior to disposition, multiple bedside re-evaluations, IV medications, cardiac monitoring, continuous pulse oximetry and complex medical decision making and emergency management    This patient has remained hemodynamically stable during their ED course. Counseling: The emergency provider has spoken with the patient and discussed todays results, in addition to providing specific details for the plan of care and counseling regarding the diagnosis and prognosis. Questions are answered at this time and they are agreeable with the plan.       --------------------------------- IMPRESSION AND DISPOSITION ---------------------------------    IMPRESSION  1. Syncope and collapse        DISPOSITION  Disposition: Discharge to home  Patient condition is stable      NOTE: This report was transcribed using voice recognition software.  Every effort was made to ensure accuracy; however, inadvertent computerized transcription errors may be present       Jaspreet Kovacs DO  Resident  08/15/21 0021

## 2021-08-25 ENCOUNTER — OFFICE VISIT (OUTPATIENT)
Dept: PRIMARY CARE CLINIC | Age: 56
End: 2021-08-25
Payer: COMMERCIAL

## 2021-08-25 VITALS
BODY MASS INDEX: 26.58 KG/M2 | DIASTOLIC BLOOD PRESSURE: 70 MMHG | WEIGHT: 150 LBS | TEMPERATURE: 97.9 F | HEIGHT: 63 IN | SYSTOLIC BLOOD PRESSURE: 104 MMHG | OXYGEN SATURATION: 98 % | HEART RATE: 88 BPM

## 2021-08-25 DIAGNOSIS — J01.00 ACUTE NON-RECURRENT MAXILLARY SINUSITIS: ICD-10-CM

## 2021-08-25 DIAGNOSIS — R55 VASOVAGAL SYNCOPE: Primary | ICD-10-CM

## 2021-08-25 PROCEDURE — 99214 OFFICE O/P EST MOD 30 MIN: CPT | Performed by: FAMILY MEDICINE

## 2021-08-25 RX ORDER — CEFDINIR 300 MG/1
300 CAPSULE ORAL 2 TIMES DAILY
Qty: 14 CAPSULE | Refills: 0 | Status: SHIPPED | OUTPATIENT
Start: 2021-08-25 | End: 2021-09-01

## 2021-08-25 RX ORDER — LORATADINE 10 MG/1
10 TABLET ORAL DAILY
COMMUNITY

## 2021-08-25 ASSESSMENT — ENCOUNTER SYMPTOMS
WHEEZING: 0
EYE DISCHARGE: 0
SINUS PAIN: 0
ABDOMINAL PAIN: 0
DIARRHEA: 0
VOMITING: 0
SHORTNESS OF BREATH: 0
CONSTIPATION: 0
SINUS PRESSURE: 1
COUGH: 0
SORE THROAT: 0
RHINORRHEA: 0
BACK PAIN: 0
NAUSEA: 0

## 2021-08-25 NOTE — PROGRESS NOTES
21  [place] : 1965 Sex: female  Age: 64 y.o. Chief Complaint   Patient presents with    ED Follow-up     syncopal event     HPI:  64 y.o. female presents today for ER follow up. Patient's chart, medical, surgical and medication history all reviewed. ER follow up  Date seen in the ER: 21  Symptoms: syncopal episode at breast surgeon's office  Labs/Imaging: CBC, CMP, magnesium, UA, troponin- all normal; D-dimer elevated. CT head with L maxillary sinus disease, but otherwise normal.  CTA normal.   Diagnosis: syncope and collapse   Treatments:  IVF    Patient states that she has not had any further issues with syncope. Does note that her glands have been feeling swollen and sinuses congested. Otherwise doing okay. ROS:  Review of Systems   Constitutional: Negative for appetite change, chills, fatigue and fever. HENT: Positive for congestion, postnasal drip and sinus pressure. Negative for ear pain, rhinorrhea, sinus pain and sore throat. Eyes: Negative for discharge. Respiratory: Negative for cough, shortness of breath and wheezing. Cardiovascular: Negative for chest pain and palpitations. Gastrointestinal: Negative for abdominal pain, constipation, diarrhea, nausea and vomiting. Musculoskeletal: Negative for arthralgias and back pain. Skin: Negative for rash. Neurological: Negative for dizziness and headaches. Hematological: Positive for adenopathy. Psychiatric/Behavioral: Negative for dysphoric mood. The patient is not nervous/anxious. All other systems reviewed and are negative.      Current Outpatient Medications on File Prior to Visit   Medication Sig Dispense Refill    loratadine (CLARITIN) 10 MG tablet Take 10 mg by mouth daily      ondansetron (ZOFRAN) 8 MG tablet TAKE 1 TABLET BY MOUTH THREE TIMES DAILY AS NEEDED FOR NAUSEA AND VOMITING      dexamethasone (DECADRON) 4 MG tablet TAKE 2 TABLETS BY MOUTH TWICE DAILY TAKE THE DAY BEFORE THE DAY OF AND THE DAY AFTER DOCETAXEL      APPLE CIDER VINEGAR PO Take by mouth      FIBER PO Take by mouth      levothyroxine (SYNTHROID) 50 MCG tablet Take 1 tablet by mouth Daily 90 tablet 3    azelastine (ASTELIN) 0.1 % nasal spray 1 spray by Nasal route 2 times daily Use in each nostril as directed 2 Bottle 1    vitamin B-12 (CYANOCOBALAMIN) 100 MCG tablet Take 50 mcg by mouth daily       No current facility-administered medications on file prior to visit.        Allergies   Allergen Reactions    Oxycodone-Acetaminophen Other (See Comments)       Past Medical History:   Diagnosis Date    Anxiety     Depression     Hypothyroidism      Past Surgical History:   Procedure Laterality Date    BREAST BIOPSY Left     PARTIAL HYSTERECTOMY Left     Left ovary gone due to endometriosis - has cervix and right ovary    PORT SURGERY Left 6/21/2021    MEDI PORT INSERTION LEFT SIDE PLACEMENT performed by Beau Rivas MD at [facility] BIOPSY RIGHT Right 5/6/2021     BREAST NEEDLE BIOPSY RIGHT 5/6/2021 [facility]     Family History   Problem Relation Age of Onset    Cervical Cancer Sister     Uterine Cancer Sister         unsure     Social History     Socioeconomic History    Marital status:      Spouse name: Not on file    Number of children: Not on file    Years of education: Not on file    Highest education level: Not on file   Occupational History    Not on file   Tobacco Use    Smoking status: Former Smoker     Packs/day: 1.00     Years: 10.00     Pack years: 10.00     Types: Cigarettes    Smokeless tobacco: Never Used   Substance and Sexual Activity    Alcohol use: Not Currently     Comment: margaret     Drug use: Never    Sexual activity: Not on file   Other Topics Concern    Not on file   Social History Narrative    Not on file     Social Determinants of Health     Financial Resource Strain:     Difficulty of Paying Living Expenses:    Food Insecurity:     Worried About Running Out of Food in the Last Year:    [address] in the Last Year:    Transportation Needs:     Lack of Transportation (Medical):  Lack of Transportation (Non-Medical):    Physical Activity:     Days of Exercise per Week:     Minutes of Exercise per Session:    Stress:     Feeling of Stress :    Social Connections:     Frequency of Communication with Friends and Family:     Frequency of Social Gatherings with Friends and Family:     Attends [employer] Services:     Active Member of Clubs or Organizations:     Attends Club or Organization Meetings:     Marital Status:    Intimate Partner Violence:     Fear of Current or Ex-Partner:     Emotionally Abused:     Physically Abused:     Sexually Abused:        Vitals:    08/25/21 0820   BP: 104/70   Pulse: 88   Temp: 97.9 °F (36.6 °C)   SpO2: 98%   Weight: 150 lb (68 kg)   Height: 5' 3\" (1.6 m)       Physical Exam:  Physical Exam  Vitals and nursing note reviewed. Constitutional:       General: She is not in acute distress. Appearance: Normal appearance. She is well-developed and normal weight. She is not ill-appearing. HENT:      Head: Normocephalic and atraumatic. Right Ear: Hearing, tympanic membrane, ear canal and external ear normal. There is no impacted cerumen. Left Ear: Hearing, tympanic membrane, ear canal and external ear normal. There is no impacted cerumen. Nose: Congestion present. No rhinorrhea. Mouth/Throat:      Mouth: Mucous membranes are moist.      Pharynx: Oropharyngeal exudate (PND) present. No posterior oropharyngeal erythema. Eyes:      General: Lids are normal. No scleral icterus. Extraocular Movements: Extraocular movements intact. Conjunctiva/sclera: Conjunctivae normal.   Cardiovascular:      Rate and Rhythm: Normal rate and regular rhythm. Heart sounds: Normal heart sounds. No murmur heard.         Comments: Port noted in L chest wall  Pulmonary:      Effort: Pulmonary effort is normal. No respiratory distress. Breath sounds: Normal breath sounds. No wheezing. Musculoskeletal:         General: Normal range of motion. Cervical back: Normal range of motion and neck supple. Right lower leg: No edema. Left lower leg: No edema. Lymphadenopathy:      Cervical: No cervical adenopathy. Skin:     General: Skin is warm and dry. Findings: No rash. Neurological:      General: No focal deficit present. Mental Status: She is alert and oriented to person, place, and time. Gait: Gait normal.   Psychiatric:         Mood and Affect: Mood and affect normal.         Speech: Speech normal.         Behavior: Behavior normal.         Thought Content:  Thought content normal.         Labs:  CBC with Differential:    Lab Results   Component Value Date    WBC 18.8 08/13/2021    RBC 4.13 08/13/2021    HGB 11.9 08/13/2021    HCT 36.6 08/13/2021     08/13/2021    MCV 88.6 08/13/2021    MCH 28.8 08/13/2021    MCHC 32.5 08/13/2021    RDW 15.3 08/13/2021    METASPCT 0.9 08/13/2021    LYMPHOPCT 1.7 08/13/2021    MONOPCT 0.9 08/13/2021    MYELOPCT 0.9 08/13/2021    BASOPCT 0.1 08/13/2021    MONOSABS 0.00 08/13/2021    LYMPHSABS 0.38 08/13/2021    EOSABS 0.49 08/13/2021    BASOSABS 0.00 08/13/2021     CMP:    Lab Results   Component Value Date     08/13/2021    K 4.5 08/13/2021     08/13/2021    CO2 27 08/13/2021    BUN 15 08/13/2021    CREATININE 0.8 08/13/2021    GFRAA >60 08/13/2021    LABGLOM >60 08/13/2021    GLUCOSE 108 08/13/2021    PROT 7.0 08/13/2021    LABALBU 4.2 08/13/2021    CALCIUM 8.7 08/13/2021    BILITOT 0.5 08/13/2021    ALKPHOS 155 08/13/2021    AST 12 08/13/2021    ALT 10 08/13/2021     Magnesium:    Lab Results   Component Value Date    MG 2.3 08/13/2021     U/A:    Lab Results   Component Value Date    COLORU Yellow 08/13/2021    PROTEINU Negative 08/13/2021    PHUR 5.0 08/13/2021    CLARITYU Clear 08/13/2021    SPECGRAV >=1.030 08/13/2021 LEUKOCYTESUR Negative 08/13/2021    UROBILINOGEN 0.2 08/13/2021    BILIRUBINUR Negative 08/13/2021    BLOODU Negative 08/13/2021    GLUCOSEU Negative 08/13/2021     HgBA1c:    Lab Results   Component Value Date    LABA1C 5.3 07/08/2020     FLP:    Lab Results   Component Value Date    TRIG 83 07/08/2020    HDL 61 07/08/2020    LDLCALC 115 07/08/2020    LABVLDL 17 07/08/2020     TSH:    Lab Results   Component Value Date    TSH 3.250 07/08/2020        Assessment and Plan:  Richie Nieves was seen today for ed follow-up. Diagnoses and all orders for this visit:    Vasovagal syncope    Acute non-recurrent maxillary sinusitis  -     cefdinir (OMNICEF) 300 MG capsule; Take 1 capsule by mouth 2 times daily for 7 days    Patient doing well overall. Will treat with antibiotics at this time for sinus symptoms. Continue fluid hydration. Return if symptoms worsen or fail to improve.       Seen By:  Elmira Best DO

## 2021-09-14 ENCOUNTER — TELEPHONE (OUTPATIENT)
Dept: BREAST CENTER | Age: 56
End: 2021-09-14

## 2021-09-14 NOTE — TELEPHONE ENCOUNTER
Patient called. States her last treatment for chemotherapy was 8/30/21. She is ready to complete her follow up breast MRI.   Will forward to Harlan Perez.

## 2021-09-15 NOTE — TELEPHONE ENCOUNTER
[address] contacted [facility] for prior authorization of outpatient imaging. A prior authorization needed for Bilateral breast MRI (23331) at [address] [place] [address] in [place]. MA Spoke with Nanda Morocho, authorization Specialist. Pending authorization number [id number], clinicals requested to be faxed to [phone]. MA faxed clinicals and received faxed confirmation that was received. MA scanned authorization form in media tab.         Electronically signed by Celeste Martinez MA on 9/15/21 at 12:45 PM EDT

## 2021-09-21 NOTE — TELEPHONE ENCOUNTER
MA called and spoke to BODØ at [facility]. Patient MRI has been approved from 09/15/2021-11/15/2021. Auth # [id number]. MA scanned auth form under media tab in patient chart.      MA faxed MRI RX to [facility]      Electronically signed by Celeste Martinez MA on 9/21/21 at 11:03 AM EDT

## 2021-09-28 ENCOUNTER — HOSPITAL ENCOUNTER (OUTPATIENT)
Dept: MRI IMAGING | Age: 56
Discharge: HOME | End: 2021-09-30
Payer: COMMERCIAL

## 2021-09-28 DIAGNOSIS — C50.411 MALIGNANT NEOPLASM OF UPPER-OUTER QUADRANT OF RIGHT BREAST IN FEMALE, ESTROGEN RECEPTOR NEGATIVE (HCC): ICD-10-CM

## 2021-09-28 DIAGNOSIS — Z17.1 MALIGNANT NEOPLASM OF UPPER-OUTER QUADRANT OF RIGHT BREAST IN FEMALE, ESTROGEN RECEPTOR NEGATIVE (HCC): ICD-10-CM

## 2021-09-28 PROCEDURE — 77049 MRI BREAST C-+ W/CAD BI: CPT

## 2021-09-28 PROCEDURE — 6360000004 HC RX CONTRAST MEDICATION: Performed by: RADIOLOGY

## 2021-09-28 PROCEDURE — A9585 GADOBUTROL INJECTION: HCPCS | Performed by: RADIOLOGY

## 2021-09-28 RX ADMIN — GADOBUTROL 7 ML: 604.72 INJECTION INTRAVENOUS at 13:49

## 2021-09-29 ENCOUNTER — TELEPHONE (OUTPATIENT)
Dept: SURGERY | Age: 56
End: 2021-09-29

## 2021-09-29 DIAGNOSIS — Z17.1 MALIGNANT NEOPLASM OF UPPER-OUTER QUADRANT OF RIGHT BREAST IN FEMALE, ESTROGEN RECEPTOR NEGATIVE (HCC): Primary | ICD-10-CM

## 2021-09-29 DIAGNOSIS — C50.411 MALIGNANT NEOPLASM OF UPPER-OUTER QUADRANT OF RIGHT BREAST IN FEMALE, ESTROGEN RECEPTOR NEGATIVE (HCC): Primary | ICD-10-CM

## 2021-09-29 NOTE — TELEPHONE ENCOUNTER
I called Josseline Yuan and spoke to both her and her . I have reviewed the prior and current MRI. The tumor is about the same size and she has a significant area of DCIS as well. I explained the next step would be surgical intervention. I offered a mastectomy with SLNB. I went over options including reconstruction. She is interested in reconstruction. I have referred her to Dr. Kade Soni office.

## 2021-09-30 ENCOUNTER — TELEPHONE (OUTPATIENT)
Dept: SURGERY | Age: 56
End: 2021-09-30

## 2021-09-30 NOTE — TELEPHONE ENCOUNTER
Patient is being referred by [facility] for malignant neoplasm right breast, please advise on scheduling.

## 2021-10-12 ENCOUNTER — HOSPITAL ENCOUNTER (OUTPATIENT)
Dept: NON INVASIVE DIAGNOSTICS | Age: 56
Discharge: HOME | End: 2021-10-12
Payer: COMMERCIAL

## 2021-10-12 LAB
LV EF: 60 %
LVEF MODALITY: NORMAL

## 2021-10-12 PROCEDURE — 93306 TTE W/DOPPLER COMPLETE: CPT

## 2021-10-13 ENCOUNTER — OFFICE VISIT (OUTPATIENT)
Dept: SURGERY | Age: 56
End: 2021-10-13
Payer: COMMERCIAL

## 2021-10-13 VITALS
HEART RATE: 52 BPM | BODY MASS INDEX: 26.93 KG/M2 | TEMPERATURE: 97.2 F | HEIGHT: 63 IN | SYSTOLIC BLOOD PRESSURE: 142 MMHG | OXYGEN SATURATION: 98 % | WEIGHT: 152 LBS | DIASTOLIC BLOOD PRESSURE: 100 MMHG

## 2021-10-13 DIAGNOSIS — Z17.1 MALIGNANT NEOPLASM OF UPPER-OUTER QUADRANT OF RIGHT BREAST IN FEMALE, ESTROGEN RECEPTOR NEGATIVE (HCC): Primary | ICD-10-CM

## 2021-10-13 DIAGNOSIS — C50.411 MALIGNANT NEOPLASM OF UPPER-OUTER QUADRANT OF RIGHT BREAST IN FEMALE, ESTROGEN RECEPTOR NEGATIVE (HCC): Primary | ICD-10-CM

## 2021-10-13 PROCEDURE — G8484 FLU IMMUNIZE NO ADMIN: HCPCS | Performed by: PLASTIC SURGERY

## 2021-10-13 PROCEDURE — 1036F TOBACCO NON-USER: CPT | Performed by: PLASTIC SURGERY

## 2021-10-13 PROCEDURE — 99243 OFF/OP CNSLTJ NEW/EST LOW 30: CPT | Performed by: PLASTIC SURGERY

## 2021-10-13 PROCEDURE — 3017F COLORECTAL CA SCREEN DOC REV: CPT | Performed by: PLASTIC SURGERY

## 2021-10-13 PROCEDURE — G8419 CALC BMI OUT NRM PARAM NOF/U: HCPCS | Performed by: PLASTIC SURGERY

## 2021-10-13 PROCEDURE — G8427 DOCREV CUR MEDS BY ELIG CLIN: HCPCS | Performed by: PLASTIC SURGERY

## 2021-10-13 NOTE — PROGRESS NOTES
Department of Plastic Surgery - Adult  Attending Consult Note          CHIEF COMPLAINT:  History of Right Breast Cancer    History Obtained From:  patient    HISTORY OF PRESENT ILLNESS:                The patient is a 64 y.o. female who presents with History of Right Breast Cancer. She is here today to discuss her options on breast reconstruction. The patient has met with Dr. Juany Corbett and has elected to proceed with right mastectomy. She has no treatments of chemotherapy remaining as she recently finished neoadjuvant chemotherapy on August 30, 2021 and will likely not be receiving radiation after mastectomy.   The patients Breast Cancer History is obtained from the patient and also notes from the care team.      Past Medical History:    Past Medical History:   Diagnosis Date    Anxiety     Depression     Hypothyroidism      Past Surgical History:    Past Surgical History:   Procedure Laterality Date    BREAST BIOPSY Left     PARTIAL HYSTERECTOMY Left     Left ovary gone due to endometriosis - has cervix and right ovary    PORT SURGERY Left 6/21/2021    MEDI PORT INSERTION LEFT SIDE PLACEMENT performed by Carine Trejo MD at [address] [place] [address]. Right 5/6/2021    US BREAST NEEDLE BIOPSY RIGHT 5/6/2021 [facility]     Current Medications:      Current Outpatient Medications   Medication Sig Dispense Refill    loratadine (CLARITIN) 10 MG tablet Take 10 mg by mouth daily      APPLE CIDER VINEGAR PO Take by mouth      FIBER PO Take by mouth      levothyroxine (SYNTHROID) 50 MCG tablet Take 1 tablet by mouth Daily 90 tablet 3    azelastine (ASTELIN) 0.1 % nasal spray 1 spray by Nasal route 2 times daily Use in each nostril as directed 2 Bottle 1    vitamin B-12 (CYANOCOBALAMIN) 100 MCG tablet Take 50 mcg by mouth daily      ondansetron (ZOFRAN) 8 MG tablet TAKE 1 TABLET BY MOUTH THREE TIMES DAILY AS NEEDED FOR NAUSEA AND VOMITING (Patient not taking: Reported on 10/13/2021)      dexamethasone (DECADRON) 4 MG tablet TAKE 2 TABLETS BY MOUTH TWICE DAILY TAKE THE DAY BEFORE THE DAY OF AND THE DAY AFTER DOCETAXEL (Patient not taking: Reported on 10/13/2021)       No current facility-administered medications for this visit. Allergies:  Oxycodone-acetaminophen    Social History:   Social History     Socioeconomic History    Marital status:      Spouse name: Not on file    Number of children: Not on file    Years of education: Not on file    Highest education level: Not on file   Occupational History    Not on file   Tobacco Use    Smoking status: Former Smoker     Packs/day: 1.00     Years: 10.00     Pack years: 10.00     Types: Cigarettes     Quit date:      Years since quittin.7    Smokeless tobacco: Never Used   Substance and Sexual Activity    Alcohol use: Not Currently     Comment: margaret     Drug use: Never    Sexual activity: Not on file   Other Topics Concern    Not on file   Social History Narrative    Not on file     Social Determinants of Health     Financial Resource Strain:     Difficulty of Paying Living Expenses:    Food Insecurity:     Worried About Running Out of Food in the Last Year:     [address] in the Last Year:    Transportation Needs:     Lack of Transportation (Medical):      Lack of Transportation (Non-Medical):    Physical Activity:     Days of Exercise per Week:     Minutes of Exercise per Session:    Stress:     Feeling of Stress :    Social Connections:     Frequency of Communication with Friends and Family:     Frequency of Social Gatherings with Friends and Family:     Attends Alevism Services:     Active Member of Clubs or Organizations:     Attends Club or Organization Meetings:     Marital Status:    Intimate Partner Violence:     Fear of Current or Ex-Partner:     Emotionally Abused:     Physically Abused:     Sexually Abused:      Family History:   Family History   Problem Relation Age of Onset    Cervical Cancer Sister     Uterine Cancer Sister         unsure       REVIEW OF SYSTEMS:      CONSTITUTIONAL:  negative for  fevers, chills, sweats and fatigue  EYES: negative for dipolpia or acute vision loss. RESPIRATORY:  negative for  dry cough, cough with sputum, dyspnea, wheezing and chest pain  HENT:negative for pain, headache, difficulty swallowing or nose bleeds. CARDIOVASCULAR:  negative for  chest pain, dyspnea, palpitations, syncope  GASTROINTESTINAL:  negative for nausea, vomiting, change in bowel habits, diarrhea, constipation and abdominal pain  EXTREMITIES: negative for edema  MUSCULOSKELETAL: negative for muscle weakness  SKIN: positive for lesionnegative for itching or rashes. HEME: negative for easy brusing or bleeding  BEHAVIOR/PSYCH:  negative for poor appetite, increased appetite, decreased sleep and poor concentration  PSYCH: Alert and oriented to person place and time    I have reviewed the chief complaint and history of present illness including (ROS and PFSH) and vital documentation by my staff and I agree with their documentation and have added when applicable. PHYSICAL EXAM:          VITALS:  BP (!) 142/100 (Site: Right Upper Arm, Position: Sitting, Cuff Size: Medium Adult)   Pulse 52   Temp 97.2 °F (36.2 °C) (Temporal)   Ht 5' 3\" (1.6 m)   Wt 152 lb (68.9 kg)   SpO2 98%   Breastfeeding No   BMI 26.93 kg/m²   CONSTITUTIONAL:  awake, alert, cooperative, no apparent distress, and appears stated age  EYES: PERRLA, EOMI, no signs of occular infection  MUSCULOSKELETAL: negative for flaccid muscle tone or spastic movements. NEURO: Cranial nerves II-XII grossly intact. No signs of agitated mood.    LUNGS:  No increased work of breathing, good air exchange, clear to auscultation bilaterally, no crackles or wheezing  CARDIOVASCULAR:  Normal apical impulse, regular rate and rhythm,   ABDOMEN:  Normal bowel sounds, soft, non-distended, non-tender,     LEFT BREAST: Rash is not noted, There are no masses palpated, no axillary lymphadenopathy, no nipple discharge. No breast pain. There are no previous scars    RIGHT BREAST: Rash is not noted, There are no masses palpated , no axillary lymphadenopathy, no nipple discharge. No breast pain. There are no previous scars                DATA:    Radiology Review:    EXAMINATION:   MRI OF THE BILATERAL BREASTS WITHOUT AND WITH CONTRAST 9/28/2021 1:11 pm:       TECHNIQUE:   Multiplanar multisequence MRI of the bilateral breasts were performed without   and with the administration of 7 mL Gadavist intravenous contrast.       Data analysis was performed with color parametric mapping, image subtraction,   and 3D reconstructions.       COMPARISON:   MRI on 05/25/2021, right breast mammogram and ultrasound on 04/22/2021,   bilateral screening mammogram on 04/14/2021       HISTORY:   Right breast cancer, follow-up status post neoadjuvant chemotherapy.       FINDINGS:   Both breasts contain heterogeneous fibroglandular tissue and demonstrate mild   background parenchymal enhancement.       Left breast:  Stable 1 cm complicated cyst in the upper outer quadrant at   anterior to middle depth.  No suspicious mass, non-mass enhancement, or other   abnormality seen.  No abnormal lymph nodes.  MediPort noted.       Right breast:  Residual enhancement at 9 o'clock corresponding to the known   malignancy measures 1.1 x 0.8 x 0.5 cm.  Additional non-mass enhancement   extending throughout the breast as previously described measures   approximately 5.5 x 4.3 x 3.8.  Stable 1 cm oval circumscribed mass centrally   at middle to posterior depth, likely a fibroadenoma.  No abnormal lymph nodes.           Impression   1.  No significant change in extent of disease within the right breast.   Residual enhancement at 9 o'clock corresponding to biopsy-proven malignancy   measures 1.1 x 0.8 x 0.5 cm.  Additional non-mass enhancement extending   throughout the breast measures 5.5 x 4.3 x 3.8 cm.  Apparent increase in   extent of suspected disease may in part be due to measurement technique.  No   lymphadenopathy. 2. No MRI evidence of malignancy in the left breast.       BIRADS:   BIRADS - CATEGORY 6       Known biopsy proven malignancy.       OVERALL ASSESSMENT - KNOWN BIOPSY PROVEN MALIGNANCY.           Breast Measurements were taken and are noted in the media section. IMPRESSION/RECOMMENDATIONS:              Diagnosis -right breast cancer    I have counseled patient for greater than 30 minutes on her breast reconstructive options. I have discussed 2 stage reconstruction with placement of tissue expander with subsequent permanent implant. She understands that this will require at least 2 surgeries and a matching procedure for the unaffected breast. She understands that she may not be a candidate for this procedure if she is having radiation therapy. If radiation therapy is part of her treatment, she understands that there can be wound healing complications and/or infections that can necessitate the removal of the expander and implant. The patient is aware that according to some data there can be up to 73% complication rate and 30% implant extrusion rate with radiation following immediate expander placement. Depending upon tumor location and size, she may have placement of the   expanders with need for immediate chest wall irradiation, ( margins are close/invoved). If margins are clear then expander filling may begin with chest radiation therapy being deferred for several weeks to months thereafter. She is aware that post-radiotherapy filling is more uncomfortable as the irradiated/irritated skin becomes fibrotic, dry, and less easily stretched without discomfort. On the other hand if margins are good, expansion could take place until desired results achieved, then radiotherapy could be given. This is less uncomfortable for the patient.        We also discussed TRAM or Latissimus flap reconstruction. She understands that the abdomen may have bulging and she may have some functional deficit and weakness. She will have a \"tummy tuck\" type scar. If the Latissimus is used she may have functional deficit and weakness as well as a scar. I also educated her on free flap reconstruction with BENJAMIN flaps. She would have to go to another institution that provides this service if she is interested in pursuing her options. She understands that these can be lengthy surgeries with higher anesthesia risks and higher liklihood of anastomotic complications. I have informed the patient that no matter which option is performed, that symmetry and similar shape between each breast will be the goal. However, a reconstructed breast will never appear the same as a native breast and to expect sone differences between each breasts. There is a likleyhood for needing more than one surgery for revisions. The patient was educated on the risks involving (Breast Implant Associated-Anaplastic Large Cell Lymphoma (JJ-ALCL)). JJ-ALCL is not a breast cancer, but a rare and treatable T-cell lymphoma that usually develops as a fluid swelling around breast implants. The lifetime risk for this disease appears to be about 1 case for every 30,000 textured implants. Thus far, there have been no confirmed cases of JJ-ALCL in women who have had only smooth-surface breast implants. The FDA is not recommending removal of textured implants. Rather, the FDA recommends, as do I, that every woman conduct regular self-examination. The patient was educated that if she does develop JJ-ALCL she may require additional treatment such as radiation or chemotherapy along with removal of the breast implant and surrounding scar tissue. The risks, benefits and options were discussed with the pt.  The risks included but not limited to pain, bleeding, infection, heavy scarring, damage to surrounding structures,  and need for further procedures. Other risks including but not limited to asymmetry, loss of nipple or/and areola, loss of sensation to nipple and areola, inability to breast feed, seroma, hematoma, implant failure, capsular contracture, and fat necrosis were also discussed with the patient. All of her questions were answered. .    She does  want to undergo implant based reconstruction, which will consist of insertion of a tissue expander with alloderm, subsequent weekly expansion, and a secondary surgery to replace the tissue expander with permanent implants. Plastic and Reconstructive surgical procedure-left prophylactic mastectomy with bilateral for stage breast reconstruction with placement of tissue expanders and AlloDerm    All of her questions were answered to her satisfaction and she agrees to proceed with the operation. Face-to-face time greater than 45 minutes, greater than 50% in counseling, education, and coordination of care. Photos were obtained. Chaperone present        This document is generated, in part, by voice recognition software and thus  syntax and grammatical errors are possible.     Radha Goodwin MD  1:03 PM  10/13/2021

## 2021-10-14 ENCOUNTER — TELEPHONE (OUTPATIENT)
Dept: SURGERY | Age: 56
End: 2021-10-14

## 2021-10-14 NOTE — TELEPHONE ENCOUNTER
I called patient to see if she is ready to move forward with surgery. I had to leave a  for her to call back. She met with Dr. Bijan Butts yesterday.

## 2021-10-19 DIAGNOSIS — C50.411 MALIGNANT NEOPLASM OF UPPER-OUTER QUADRANT OF RIGHT BREAST IN FEMALE, ESTROGEN RECEPTOR NEGATIVE (HCC): Primary | ICD-10-CM

## 2021-10-19 DIAGNOSIS — Z17.1 MALIGNANT NEOPLASM OF UPPER-OUTER QUADRANT OF RIGHT BREAST IN FEMALE, ESTROGEN RECEPTOR NEGATIVE (HCC): Primary | ICD-10-CM

## 2021-10-21 ENCOUNTER — TELEPHONE (OUTPATIENT)
Dept: SURGERY | Age: 56
End: 2021-10-21

## 2021-10-21 NOTE — TELEPHONE ENCOUNTER
Pt SLN injection is scheduled for 10am on 10/28/2021.  Patient arrival time will be 8:30am.      Electronically signed by Get Cruz MA on 10/21/21 at 1:29 PM EDT

## 2021-10-21 NOTE — TELEPHONE ENCOUNTER
Joint case with     Procedure: left prophylactic mastectomy with bilateral first stage reconstruction using tissue expander and alloderm   Surgery has been scheduled at [address] [place] [address] on 10/28/21, Pre-Admission Testing will call you prior to surgery to inform you arrival time and any other additional directions,if they are unable to reach you,please call them two days prior at [phone]. If taking Fish Oil, Vitamins, two weeks prior to surgery stop taking. If taking NSAIDS (such as Aspirin, Ibuprofen) anticoagulants please consult with your prescribing physician to get further instructions on when to stop medication prior to surgery that is scheduled, patient understood.     Pre-Auth #: [id number]  CPT Codes: 54017,96874,18572  ICD 10:c50.411,z17.1      Liang Newton reviewed [facility] progress note in Media

## 2021-10-21 NOTE — TELEPHONE ENCOUNTER
GUNNAR Bustos called and spoke to Caren and scheduled PT for right breast mastectomy with SLNB, poss ALND and joint case with  on 10/28/2021 @ 12pm with Dr. Nika Marcus. PT is not taking ASA/blood thinner products. PT has received both COVID 19 vaccines. PT verbalized she understood prep instructions, and NPO after midnight. PT verbalized that she understood appointment date/time, as well as to arrive 1.5 hours prior to SLN injection procedure. PT advised PAT will call to go over all medication and advise on where to park and enter the hospital at for procedure. PT has been told to make sure they have a ride to and from procedure as they are not allowed to drive after procedure. MA instructed PT to call the office at [phone] with any questions, comments, or concerns about the procedure.        Electronically signed by Yanci Noriega MA on 10/21/21 at 9:08 AM EDT

## 2021-10-21 NOTE — H&P
(ZOFRAN) 8 MG tablet TAKE 1 TABLET BY MOUTH THREE TIMES DAILY AS NEEDED FOR NAUSEA AND VOMITING (Patient not taking: Reported on 10/13/2021)        dexamethasone (DECADRON) 4 MG tablet TAKE 2 TABLETS BY MOUTH TWICE DAILY TAKE THE DAY BEFORE THE DAY OF AND THE DAY AFTER DOCETAXEL (Patient not taking: Reported on 10/13/2021)          No current facility-administered medications for this visit.            Allergies:  Oxycodone-acetaminophen     Social History:   Social History               Socioeconomic History    Marital status:        Spouse name: Not on file    Number of children: Not on file    Years of education: Not on file    Highest education level: Not on file   Occupational History    Not on file   Tobacco Use    Smoking status: Former Smoker       Packs/day: 1.00       Years: 10.00       Pack years: 10.00       Types: Cigarettes       Quit date:        Years since quittin.    Smokeless tobacco: Never Used   Substance and Sexual Activity    Alcohol use: Not Currently       Comment: margaret     Drug use: Never    Sexual activity: Not on file   Other Topics Concern    Not on file   Social History Narrative    Not on file      Social Determinants of Health          Financial Resource Strain:     Difficulty of Paying Living Expenses:    Food Insecurity:     Worried About Running Out of Food in the Last Year:     Ran Out of Food in the Last Year:    Transportation Needs:     Lack of Transportation (Medical):      Lack of Transportation (Non-Medical):    Physical Activity:     Days of Exercise per Week:     Minutes of Exercise per Session:    Stress:     Feeling of Stress :    Social Connections:     Frequency of Communication with Friends and Family:     Frequency of Social Gatherings with Friends and Family:     Attends Yazidi Services:     Active Member of Clubs or Organizations:     Attends Club or Organization Meetings:     Marital Status:    Intimate Partner Violence:     Fear of Current or Ex-Partner:     Emotionally Abused:     Physically Abused:     Sexually Abused:          Family History:   Family History         Family History   Problem Relation Age of Onset    Cervical Cancer Sister      Uterine Cancer Sister           unsure            REVIEW OF SYSTEMS:       CONSTITUTIONAL:  negative for  fevers, chills, sweats and fatigue  EYES: negative for dipolpia or acute vision loss. RESPIRATORY:  negative for  dry cough, cough with sputum, dyspnea, wheezing and chest pain  HENT:negative for pain, headache, difficulty swallowing or nose bleeds. CARDIOVASCULAR:  negative for  chest pain, dyspnea, palpitations, syncope  GASTROINTESTINAL:  negative for nausea, vomiting, change in bowel habits, diarrhea, constipation and abdominal pain  EXTREMITIES: negative for edema  MUSCULOSKELETAL: negative for muscle weakness  SKIN: positive for lesionnegative for itching or rashes. HEME: negative for easy brusing or bleeding  BEHAVIOR/PSYCH:  negative for poor appetite, increased appetite, decreased sleep and poor concentration  PSYCH: Alert and oriented to person place and time     I have reviewed the chief complaint and history of present illness including (ROS and PFSH) and vital documentation by my staff and I agree with their documentation and have added when applicable.     PHYSICAL EXAM:             VITALS:  BP (!) 142/100 (Site: Right Upper Arm, Position: Sitting, Cuff Size: Medium Adult)   Pulse 52   Temp 97.2 °F (36.2 °C) (Temporal)   Ht 5' 3\" (1.6 m)   Wt 152 lb (68.9 kg)   SpO2 98%   Breastfeeding No   BMI 26.93 kg/m²   CONSTITUTIONAL:  awake, alert, cooperative, no apparent distress, and appears stated age  EYES: PERRLA, EOMI, no signs of occular infection  MUSCULOSKELETAL: negative for flaccid muscle tone or spastic movements. NEURO: Cranial nerves II-XII grossly intact. No signs of agitated mood.    LUNGS:  No increased work of breathing, good air exchange, clear to auscultation bilaterally, no crackles or wheezing  CARDIOVASCULAR:  Normal apical impulse, regular rate and rhythm,   ABDOMEN:  Normal bowel sounds, soft, non-distended, non-tender,      LEFT BREAST: Rash is not noted, There are no masses palpated, no axillary lymphadenopathy, no nipple discharge. No breast pain. There are no previous scars     RIGHT BREAST: Rash is not noted, There are no masses palpated , no axillary lymphadenopathy, no nipple discharge. No breast pain. There are no previous scars                       DATA:    Radiology Review:    EXAMINATION:   MRI OF THE BILATERAL BREASTS WITHOUT AND WITH CONTRAST 9/28/2021 1:11 pm:       TECHNIQUE:   Multiplanar multisequence MRI of the bilateral breasts were performed without   and with the administration of 7 mL Gadavist intravenous contrast.       Data analysis was performed with color parametric mapping, image subtraction,   and 3D reconstructions.       COMPARISON:   MRI on 05/25/2021, right breast mammogram and ultrasound on 04/22/2021,   bilateral screening mammogram on 04/14/2021       HISTORY:   Right breast cancer, follow-up status post neoadjuvant chemotherapy.       FINDINGS:   Both breasts contain heterogeneous fibroglandular tissue and demonstrate mild   background parenchymal enhancement.       Left breast:  Stable 1 cm complicated cyst in the upper outer quadrant at   anterior to middle depth.  No suspicious mass, non-mass enhancement, or other   abnormality seen.  No abnormal lymph nodes.  MediPort noted.       Right breast:  Residual enhancement at 9 o'clock corresponding to the known   malignancy measures 1.1 x 0.8 x 0.5 cm.  Additional non-mass enhancement   extending throughout the breast as previously described measures   approximately 5.5 x 4.3 x 3.8.  Stable 1 cm oval circumscribed mass centrally   at middle to posterior depth, likely a fibroadenoma.  No abnormal lymph nodes.           Impression   1.  No significant change in extent of disease within the right breast.   Residual enhancement at 9 o'clock corresponding to biopsy-proven malignancy   measures 1.1 x 0.8 x 0.5 cm.  Additional non-mass enhancement extending   throughout the breast measures 5.5 x 4.3 x 3.8 cm.  Apparent increase in   extent of suspected disease may in part be due to measurement technique.  No   lymphadenopathy. 2. No MRI evidence of malignancy in the left breast.       BIRADS:   BIRADS - CATEGORY 6       Known biopsy proven malignancy.       OVERALL ASSESSMENT - KNOWN BIOPSY PROVEN MALIGNANCY.             Breast Measurements were taken and are noted in the media section.     IMPRESSION/RECOMMENDATIONS:                   Diagnosis -right breast cancer     I have counseled patient for greater than 30 minutes on her breast reconstructive options.     I have discussed 2 stage reconstruction with placement of tissue expander with subsequent permanent implant. She understands that this will require at least 2 surgeries and a matching procedure for the unaffected breast. She understands that she may not be a candidate for this procedure if she is having radiation therapy. If radiation therapy is part of her treatment, she understands that there can be wound healing complications and/or infections that can necessitate the removal of the expander and implant. The patient is aware that according to some data there can be up to 36% complication rate and 65% implant extrusion rate with radiation following immediate expander placement.     Depending upon tumor location and size, she may have placement of the   expanders with need for immediate chest wall irradiation, ( margins are close/invoved). If margins are clear then expander filling may begin with chest radiation therapy being deferred for several weeks to months thereafter.  She is aware that post-radiotherapy filling is more uncomfortable as the irradiated/irritated skin becomes fibrotic, dry, and less easily stretched without discomfort. On the other hand if margins are good, expansion could take place until desired results achieved, then radiotherapy could be given. This is less uncomfortable for the patient.         We also discussed TRAM or Latissimus flap reconstruction. She understands that the abdomen may have bulging and she may have some functional deficit and weakness. She will have a \"tummy tuck\" type scar. If the Latissimus is used she may have functional deficit and weakness as well as a scar.     I also educated her on free flap reconstruction with BENJAMIN flaps. She would have to go to another institution that provides this service if she is interested in pursuing her options. She understands that these can be lengthy surgeries with higher anesthesia risks and higher liklihood of anastomotic complications.     I have informed the patient that no matter which option is performed, that symmetry and similar shape between each breast will be the goal. However, a reconstructed breast will never appear the same as a native breast and to expect sone differences between each breasts. There is a likleyhood for needing more than one surgery for revisions.     The patient was educated on the risks involving (Breast Implant Associated-Anaplastic Large Cell Lymphoma (JJ-ALCL)). JJ-ALCL is not a breast cancer, but a rare and treatable T-cell lymphoma that usually develops as a fluid swelling around breast implants. The lifetime risk for this disease appears to be about 1 case for every 30,000 textured implants. Thus far, there have been no confirmed cases of JJ-ALCL in women who have had only smooth-surface breast implants. The FDA is not recommending removal of textured implants. Rather, the FDA recommends, as do I, that every woman conduct regular self-examination.    The patient was educated that if she does develop JJ-ALCL she may require additional treatment such as radiation or chemotherapy along with removal of the breast implant and surrounding scar tissue.     The risks, benefits and options were discussed with the pt. The risks included but not limited to pain, bleeding, infection, heavy scarring, damage to surrounding structures,  and need for further procedures. Other risks including but not limited to asymmetry, loss of nipple or/and areola, loss of sensation to nipple and areola, inability to breast feed, seroma, hematoma, implant failure, capsular contracture, and fat necrosis were also discussed with the patient. All of her questions were answered. .     She does  want to undergo implant based reconstruction, which will consist of insertion of a tissue expander with alloderm, subsequent weekly expansion, and a secondary surgery to replace the tissue expander with permanent implants.         Plastic and Reconstructive surgical procedure-left prophylactic mastectomy with bilateral for stage breast reconstruction with placement of tissue expanders and AlloDerm        Attestation    No change in patient's H & P. I have reviewed the procedure with the patient as well as the risk and benefits. They have no further questions and agree to proceed with surgery.     Luke Guardado MD   8:46 AM  10/28/2021

## 2021-10-22 RX ORDER — MULTIVIT WITH MINERALS/LUTEIN
500 TABLET ORAL DAILY
COMMUNITY

## 2021-10-22 NOTE — PROGRESS NOTES
[facility] PRE-ADMISSION TESTING GENERAL INSTRUCTIONS- [facility]-phone number:[phone]    GENERAL INSTRUCTIONS  [x] Antibacterial Soap shower Night before and/or AM of Surgery  [] Jerzy wipe instruction sheet and wipes given. [x] Nothing by mouth after midnight, including gum, candy, mints, or water. [x] You may brush your teeth, gargle, but do NOT swallow water. []Hibiclens shower  the night before and the morning of surgery. Do not use             Hibiclens on your face or head. [x]No smoking, chewing tobacco, illegal drugs, or alcohol within 24 hours of your surgery. [x] Jewelry, valuables or body piercing's should not be brought to the hospital. All body and/or tongue piercing's must be removed prior to arriving to hospital.  ALL hair pins must be removed. [x] Do not wear makeup, lotions, powders, deodorant. Nail polish as directed by the nurse. [x] Arrange transportation with a responsible adult  to and from the hospital. If you do not have a responsible adult  to transport you, you will need to make arrangements with a medical transportation company (i.e. [employer]. A Uber/taxi/bus is not appropriate unless you are accompanied by a responsible adult ). Arrange for someone to be with you for the remainder of the day and for 24 hours after your procedure due to having had anesthesia. Who will be your  for transportation?_____Francis_____________   Who will be staying with you for 24 hrs after your procedure?__________________  [x] Bring insurance card and photo ID.  [] Transfusion Bracelet: Please bring with you to hospital, day of surgery  [] Bring urine specimen day of surgery. Any small container is acceptable. [] Use inhalers the morning of surgery and bring with you to hospital.  [] Bring copy of living will or healthcare power of  papers to be placed in your electronic record.   [] CPAP/BI-PAP: Please bring your machine if you are to spend the night in the hospital.     PARKING INSTRUCTIONS:   [x] Arrival Time:__0830_____  · [x] Parking lot '\"I\"  is located on [facility] (the corner of [facility] and [facility]). To enter, press the button and the gate will lift. A free token will be provided to exit the lot. One car per patient is allowed to park in this lot. All other cars are to park on [address] [place] [address] Street either in the parking garage or the handicap lot. [] To reach the [facility] lobby from [address] [place] [address], upon entering the hospital, take elevator B to the 3rd floor. EDUCATION INSTRUCTIONS:      [] Knee or hip replacement booklet & exercise pamphlets given. [] Blanca Zarate placed in chart. [] Pre-admission Testing educational folder given  [] Incentive Spirometry,coughing & deep breathing exercises reviewed. []Medication information sheet(s)   []Fluoroscopy-Xray used in surgery reviewed with patient. Educational pamphlet placed in chart. []Pain: Post-op pain is normal and to be expected. You will be asked to rate your pain from 0-10(a zero is not acceptable-education is needed). Your post-op pain goal is:  [] Ask your nurse for your pain medication. [] Joint camp offered. [] Joint replacement booklets given. [] Other:___________________________    MEDICATION INSTRUCTIONS:   [x]Bring a complete list of your medications, please write the last time you took the medicine, give this list to the nurse. [x] Take the following medications the morning of surgery with 1-2 ounces of water:  No meds  [x] Stop herbal supplements and vitamins 5 days before your surgery. [] DO NOT take any diabetic medicine the morning of surgery. Follow instructions for insulin the day before surgery. [] If you are diabetic and your blood sugar is low or you feel symptomatic, you may drink 1-2 ounces of apple juice or take a glucose tablet.   The morning of your procedure, you may call the pre-op area if you have concerns about your blood sugar [phone]. [] Use your inhalers the morning of surgery. Bring your emergency inhaler with you day of surgery. [x] Follow physician instructions regarding any blood thinners you may be taking. WHAT TO EXPECT:  [x] The day of surgery you will be greeted and checked in by the [facility].  In addition, you will be registered in the [facility] by a Patient Access Representative. Please bring your photo ID and insurance card. A nurse will greet you in accordance to the time you are needed in the pre-op area to prepare you for surgery. Please do not be discouraged if you are not greeted in the order you arrive as there are many variables that are involved in patient preparation. Your patience is greatly appreciated as you wait for your nurse. Please bring in items such as: books, magazines, newspapers, electronics, or any other items  to occupy your time in the waiting area. [x]  Delays may occur with surgery and staff will make a sincere effort to keep you informed of delays. If any delays occur with your procedure, we apologize ahead of time for your inconvenience as we recognize the value of your time.

## 2021-10-27 ENCOUNTER — PREP FOR PROCEDURE (OUTPATIENT)
Dept: SURGERY | Age: 56
End: 2021-10-27

## 2021-10-27 ENCOUNTER — ANESTHESIA EVENT (OUTPATIENT)
Dept: OPERATING ROOM | Age: 56
End: 2021-10-27
Payer: COMMERCIAL

## 2021-10-27 RX ORDER — SODIUM CHLORIDE 9 MG/ML
25 INJECTION, SOLUTION INTRAVENOUS PRN
Status: CANCELLED | OUTPATIENT
Start: 2021-10-27

## 2021-10-27 RX ORDER — SODIUM CHLORIDE 0.9 % (FLUSH) 0.9 %
10 SYRINGE (ML) INJECTION EVERY 12 HOURS SCHEDULED
Status: CANCELLED | OUTPATIENT
Start: 2021-10-27

## 2021-10-27 RX ORDER — SODIUM CHLORIDE 9 MG/ML
INJECTION, SOLUTION INTRAVENOUS CONTINUOUS
Status: CANCELLED | OUTPATIENT
Start: 2021-10-27

## 2021-10-27 RX ORDER — SODIUM CHLORIDE 0.9 % (FLUSH) 0.9 %
10 SYRINGE (ML) INJECTION PRN
Status: CANCELLED | OUTPATIENT
Start: 2021-10-27

## 2021-10-27 NOTE — H&P (VIEW-ONLY)
[facility]  HISTORY & PHYSICAL  ATTENDING NOTE     Patient's Name/Date of Birth: Lacey Denson / 1965     Date: 2021              Chief Complaint   Patient presents with    Consultation       NEW BREAST CANCER:  Right breast - Invasive ductal carcinoma - ER(-) AR(-) HER2(-)  -- imaging/biopsy [facility] - Dr Jaen Love    Results       Pt had labs, genetics testing and Breast MRI done prior to appt.           Abhishek Denson presents for evaluation of a mammographic abnormality.     PCP: Hannah Mcdonald DO. Gynecologist: none.     Referred by:  Dr. Jane Love     The mammogram was performed at [facility] on 2021. The patient has not noted a change in BSE since presentation.  Patient denies nipple discharge. Patient denies a personal history of breast cancer.  Breast cancer risk factors include infrequent SMEs and age and gender. [facility] Ancestry: No.     OBSTETRIC RELATED HISTORY:  Age of menarche was 16. Age of menopause was 41.  Hysterectomy and unilateral oophorectomy  Patient denies hormonal therapy. Patient is . Age of first live birth was N/A. Patient did not breast feed. Is patient interested in fertility information about fertility preservation? No     CANCER SURVEILLANCE HISTORY:  Mammograms: Yes   Breast MRI's: Yes   Breast Biopsies: Yes --2 biopsies left side  Colonoscopy: No   GI Polyps: Not Applicable   EGD: No   Pelvic Exam: Yes 10 y ago  Pap Smear: Yes   Dermatology: No   Lung screening: no  H/O DVT:  no  H/O Radiation:  no           Estimated body mass index is 25.51 kg/m² as calculated from the following:    Height as of this encounter: 5' 3\" (1.6 m).    Weight as of this encounter: 144 lb (65.3 kg). Bra Size: 34C     Because violence is so common, we ask all our patients: are you in a relationship or do you live with a person who threatens, hurts, or controls you:  no     Patient drinks moderate caffeinated beverages.  Patient does not smoke cigarettes.  Patient does not use recreational drugs.        Past Medical History           Past Medical History:   Diagnosis Date    Anxiety      Depression      Hypothyroidism              Past Surgical History             Past Surgical History:   Procedure Laterality Date    BREAST BIOPSY Left      PARTIAL HYSTERECTOMY Left       Left ovary gone due to endometriosis - has cervix and right ovary    US BREAST NEEDLE BIOPSY RIGHT Right 5/6/2021     US BREAST NEEDLE BIOPSY RIGHT 5/6/2021 [facility]            Current Facility-Administered Medications          Current Outpatient Medications   Medication Sig Dispense Refill    levothyroxine (SYNTHROID) 50 MCG tablet Take 1 tablet by mouth Daily 90 tablet 3    azelastine (ASTELIN) 0.1 % nasal spray 1 spray by Nasal route 2 times daily Use in each nostril as directed 2 Bottle 1    vitamin B-12 (CYANOCOBALAMIN) 100 MCG tablet Take 50 mcg by mouth daily        cetirizine (ZYRTEC) 10 MG tablet Take 10 mg by mouth daily          No current facility-administered medications for this visit.            Family History             Family History   Problem Relation Age of Onset    Cervical Cancer Sister      Uterine Cancer Sister           unsure         [employer] Ancestry: No             Allergies   Allergen Reactions    Oxycodone-Acetaminophen Other (See Comments)             Social History                   Socioeconomic History    Marital status:        Spouse name: Not on file    Number of children: Not on file    Years of education: Not on file    Highest education level: Not on file   Occupational History    Not on file   Tobacco Use    Smoking status: Former Smoker       Packs/day: 1.00       Years: 10.00       Pack years: 10.00       Types: Cigarettes    Smokeless tobacco: Never Used   Substance and Sexual Activity    Alcohol use: Not Currently       Comment: margaret     Drug use: Never    Sexual activity: Not on file Other Topics Concern    Not on file   Social History Narrative    Not on file      Social Determinants of Health            Financial Resource Strain:     Difficulty of Paying Living Expenses:    Food Insecurity:     Worried About Running Out of Food in the Last Year:     [address] in the Last Year:    Transportation Needs:     Lack of Transportation (Medical):  Lack of Transportation (Non-Medical):    Physical Activity:     Days of Exercise per Week:     Minutes of Exercise per Session:    Stress:     Feeling of Stress :    Social Connections:     Frequency of Communication with Friends and Family:     Frequency of Social Gatherings with Friends and Family:     Attends Jain Services:     Active Member of Clubs or Organizations:     Attends Club or Organization Meetings:     Marital Status:    Intimate Partner Violence:     Fear of Current or Ex-Partner:     Emotionally Abused:     Physically Abused:     Sexually Abused:              Domo Grant in a SNF     Review of Systems   Constitutional: Negative.  Negative for activity change, appetite change and unexpected weight change. HENT: Negative.    Eyes: Negative.    Respiratory: Negative.  Negative for cough and shortness of breath.    Cardiovascular: Negative.  Negative for chest pain and leg swelling. Gastrointestinal: Negative. Emiliano Dasen for abdominal distention, abdominal pain, anal bleeding, blood in stool, constipation, diarrhea, nausea and vomiting. Endocrine: Negative.    Genitourinary: Negative.    Musculoskeletal: Positive for back pain (chronic). Negative for arthralgias, gait problem, joint swelling and myalgias. Skin: Negative.    Allergic/Immunologic: Negative.    Neurological: Negative. Emiliano Dasen for dizziness, weakness and headaches.    Hematological: Negative.    Psychiatric/Behavioral: Negative.  Negative for confusion, decreased concentration and sleep disturbance.         ECOG PS:  0  Covid vaccination?  Yes Pfizer  Flu Vaccination? Yes     BP (!) 144/90 (Site: Left Upper Arm, Position: Sitting, Cuff Size: Large Adult)   Pulse 81   Temp 98.6 °F (37 °C) (Infrared)   Resp 16   Ht 5' 3\" (1.6 m)   Wt 144 lb (65.3 kg)   SpO2 98%   BMI 25.51 kg/m²   Physical Exam  Constitutional:       Appearance: Normal appearance. HENT:      Head: Normocephalic and atraumatic.      Nose: Nose normal.      Mouth/Throat:      Mouth: Mucous membranes are moist.      Pharynx: Oropharynx is clear. Eyes:      Extraocular Movements: Extraocular movements intact.      Pupils: Pupils are equal, round, and reactive to light. Cardiovascular:      Rate and Rhythm: Normal rate and regular rhythm.      Pulses: Normal pulses.      Heart sounds: Normal heart sounds. Pulmonary:      Effort: Pulmonary effort is normal.      Breath sounds: Normal breath sounds. Chest:      Breasts:         Right: No swelling, bleeding, inverted nipple, mass, nipple discharge, skin change or tenderness.         Left: No swelling, bleeding, inverted nipple, mass, nipple discharge, skin change or tenderness.       Abdominal:      General: There is no distension.      Palpations: Abdomen is soft.      Tenderness: There is no abdominal tenderness. Musculoskeletal:         General: No tenderness or signs of injury.      Cervical back: Normal range of motion and neck supple. Lymphadenopathy:      Cervical:      Right cervical: No superficial cervical adenopathy.     Left cervical: No superficial cervical adenopathy.      Upper Body:      Right upper body: No supraclavicular or axillary adenopathy.      Left upper body: No supraclavicular or axillary adenopathy. Skin:     General: Skin is warm and dry. Neurological:      General: No focal deficit present.      Mental Status: She is alert and oriented to person, place, and time.    Psychiatric:         Mood and Affect: Mood normal.         BehaviorKendell Sames         Thought Content: Thought content normal.         Judgment: Judgment normal.            MAMMOGRAM:  EXAMINATION:   SCREENING DIGITAL BILATERAL  MAMMOGRAM WITH TOMOSYNTHESIS, 4/14/2021       TECHNIQUE:   Screening mammography of the bilateral breasts was performed with   tomosynthesis.  2D standard and 3D tomosynthesis combination imaging   performed through both breasts in the MLO and CC projection.  Computer aided   detection was utilized in the interpretation of this exam.       COMPARISON:   12/26/2016 and 12/11/2015       HISTORY:   Screening.       FINDINGS:   The breast parenchyma is heterogeneously dense.       Right breast:       There are scattered calcifications seen within the right breast.  Within the   upper-outer quadrant of the right breast there are increased calcifications. Further evaluation with magnification images are recommended.       There is no skin thickening or nipple retraction.       Left breast:       There is no suspicious mass, clustered microcalcifications or architectural   torsion.           Impression   1. Increased microcalcifications seen within the upper outer quadrant of the   right breast when compared with the patient's most recent prior study of   12/26/2016.  Further evaluation with dedicated magnification views is   recommended.    2. There is no mammographic evidence of malignancy of the left breast.   Intra-mammography of the left breast is recommended.       BIRADS:   BIRADS - CATEGORY 0      EXAMINATION:   DIAGNOSTIC DIGITAL RIGHT BREAST MAMMOGRAM; TARGETED ULTRASOUND OF THE RIGHT   BREAST, 4/22/2021 2:57 pm       TECHNIQUE:   Diagnostic mammography of the right breast was performed.  Computer aided   detection was utilized in the interpretation of this exam.; Targeted   ultrasound of the right breast was performed.       Views: Right cc spot magnification and right mL spot magnification views       COMPARISON:   Multiple prior studies, the most recent dated April 14, 2021     HISTORY:   ORDERING SYSTEM PROVIDED HISTORY: Abnormal mammogram   TECHNOLOGIST PROVIDED HISTORY:   Per [facility] protocol   Reason for exam:->right breast calcifications       FINDINGS:   Right diagnostic mammogram: Breast tissue is heterogeneously dense which may   obscure small masses.  There are scattered punctate, amorphous, and round   calcifications in the upper outer right breast, none of which are grouped.  A   focal asymmetry is also noted in the upper outer quadrant.       Right diagnostic ultrasound: Targeted right breast ultrasound was performed   utilizing high-resolution, real-time scanning.  There is a complex cystic and   solid mass in the right breast 9 o'clock 5 cm from the nipple measuring 8 x 6   x 8 mm.  It has an irregular shape with angular margins.  No axillary   lymphadenopathy seen. .           Impression   1.  Complex cystic and solid mass in the right breast 9 o'clock is suspicious   for neoplasm.       2.  Scattered punctate, amorphous, and round calcifications in the upper   outer right breast are benign.       Recommendation:       Ultrasound-guided core biopsy of the suspicious complex cystic and solid mass   in the right breast 9 o'clock is recommended.       BIRADS:   BIRADS - CATEGORY 4       Suspicious Abnormality.  Biopsy should be considered at this time.               ULTRASOUND:  EXAMINATION:   DIAGNOSTIC DIGITAL RIGHT BREAST MAMMOGRAM; TARGETED ULTRASOUND OF THE RIGHT   BREAST, 4/22/2021 2:57 pm       TECHNIQUE:   Diagnostic mammography of the right breast was performed.  Computer aided   detection was utilized in the interpretation of this exam.; Targeted   ultrasound of the right breast was performed.       Views: Right cc spot magnification and right mL spot magnification views       COMPARISON:   Multiple prior studies, the most recent dated April 14, 2021       HISTORY:   ORDERING SYSTEM PROVIDED HISTORY: Abnormal mammogram   TECHNOLOGIST PROVIDED HISTORY:   Per [facility] protocol   Reason for exam:->right breast calcifications       FINDINGS:   Right diagnostic mammogram: Breast tissue is heterogeneously dense which may   obscure small masses.  There are scattered punctate, amorphous, and round   calcifications in the upper outer right breast, none of which are grouped.  A   focal asymmetry is also noted in the upper outer quadrant.       Right diagnostic ultrasound: Targeted right breast ultrasound was performed   utilizing high-resolution, real-time scanning.  There is a complex cystic and   solid mass in the right breast 9 o'clock 5 cm from the nipple measuring 8 x 6   x 8 mm.  It has an irregular shape with angular margins.  No axillary   lymphadenopathy seen. .           Impression   1.  Complex cystic and solid mass in the right breast 9 o'clock is suspicious   for neoplasm.       2.  Scattered punctate, amorphous, and round calcifications in the upper   outer right breast are benign.       Recommendation:       Ultrasound-guided core biopsy of the suspicious complex cystic and solid mass   in the right breast 9 o'clock is recommended.       BIRADS:   BIRADS - CATEGORY 4      MRI BREAST:  EXAMINATION:   MRI OF THE BILATERAL BREASTS WITHOUT AND WITH CONTRAST 5/25/2021 9:58 am:       TECHNIQUE:   Multiplanar, multisequence MRI of the bilateral breasts was performed without   and with the administration of intravenous contrast.       Data analysis was performed with color parametric mapping, image subtraction,   and 3D reconstructions.       COMPARISON:   Multiple prior studies, the most recent dated May 6, 2021.       HISTORY:   ORDERING SYSTEM PROVIDED HISTORY: Malignant neoplasm of right breast in   female, estrogen receptor negative, unspecified site of breast ([facility] [address]   TECHNOLOGIST PROVIDED HISTORY:   Reason for exam:->to determine the extent of disease,       FINDINGS:   There is heterogeneous fibroglandular tissue with minimal background   parenchymal enhancement.  An irregular, enhancing mass is again noted in the   right breast 9 o'clock which measures 1.1 x 0.8 x 0.9 cm (image 58 of the   axial T1 post contrast series).  There is adjacent focal, heterogeneous non   mass enhancement located along the medial, lateral, anterior, and superior   aspects of the primary lesion.  Total extent of disease is approximately 3.3   x 2.5 x 5.0 cm.  No suspicious mass or non mass enhancement seen on the left. There is no lymphadenopathy.  Bilateral skin and nipple-areolar complexes are   normal.  Visualized chest and abdominal organs are unremarkable.           Impression   1.  Multifocal neoplasm in the lower outer quadrant of the right breast.       2.  There is no lymphadenopathy.       3.  No evidence of neoplasm in the left breast       RECOMMENDATION:       Continued surgical and oncologic consultation is advised.       BIRADS:   6: Known neoplasm      PATHOLOGY:  Diagnosis:   Right breast, 9:00, core biopsy: Invasive ductal carcinoma, grade 2. Breast Cancer Marker Studies:   Estrogen Receptors (ER): Negative         Internal control cells present and stain as expected: Yes     Progesterone Receptors (NC): Negative         Internal control cells present and stain as expected: Yes     Hormone receptor studies are performed by immunohistochemistry on   formalin-fixed, paraffin-embedded tissue (Roche Benchmark Immunostainer,   [place] anti-ER clone [id number], anti-NC clone [id number], polymer-based detection   chemistry). ER and NC are evaluated based on the percentage of cells   showing nuclear staining with >1% considered positive for each.      Her-2/alex (c-erb B-2) protein expression: Negative (0)      ASSESSMENT/PLAN:  Right breast cancer--TNBC, clinical prognostic stage IB, grade 2; likely DCIS component based on MRI findings  --CBC, CMP, CXR done and reviewed  --genetic testing--[id number]  --repeat MRI shows minimal response to chemotherapy    Due to patient's mental response to chemotherapy plan for a right mastectomy sentinel lymph node biopsy. She has requested reconstruction she has met with Dr. Andrea Joiner and they are planning on doing a left prophylactic mastectomy with reconstruction.     I have gone over the risk benefits alternatives to this procedure and she wishes to proceed    Renetta Guzman MD, MSc, FACS  10/27/2021  7:29 PM

## 2021-10-27 NOTE — H&P
[facility]  HISTORY & PHYSICAL  ATTENDING NOTE     Patient's Name/Date of Birth: Lacey Denson / 1965     Date: 2021              Chief Complaint   Patient presents with    Consultation       NEW BREAST CANCER:  Right breast - Invasive ductal carcinoma - ER(-) MS(-) HER2(-)  -- imaging/biopsy [facility] - Dr Cynthia Dempsey    Results       Pt had labs, genetics testing and Breast MRI done prior to appt.           Keelyse Yobany Denson presents for evaluation of a mammographic abnormality.     PCP: Hannah Mcdonald DO. Gynecologist: none.     Referred by:  Dr. Cynthia Dempsey     The mammogram was performed at [facility] on 2021. The patient has not noted a change in BSE since presentation.  Patient denies nipple discharge. Patient denies a personal history of breast cancer.  Breast cancer risk factors include infrequent SMEs and age and gender. Gaylyn Back Mu-ism Ancestry: No.     OBSTETRIC RELATED HISTORY:  Age of menarche was 16. Age of menopause was 41.  Hysterectomy and unilateral oophorectomy  Patient denies hormonal therapy. Patient is . Age of first live birth was N/A. Patient did not breast feed. Is patient interested in fertility information about fertility preservation? No     CANCER SURVEILLANCE HISTORY:  Mammograms: Yes   Breast MRI's: Yes   Breast Biopsies: Yes --2 biopsies left side  Colonoscopy: No   GI Polyps: Not Applicable   EGD: No   Pelvic Exam: Yes 10 y ago  Pap Smear: Yes   Dermatology: No   Lung screening: no  H/O DVT:  no  H/O Radiation:  no           Estimated body mass index is 25.51 kg/m² as calculated from the following:    Height as of this encounter: 5' 3\" (1.6 m).    Weight as of this encounter: 144 lb (65.3 kg). Bra Size: 34C     Because violence is so common, we ask all our patients: are you in a relationship or do you live with a person who threatens, hurts, or controls you:  no     Patient drinks moderate caffeinated beverages.  Patient does not smoke cigarettes.  Patient does not use recreational drugs.        Past Medical History           Past Medical History:   Diagnosis Date    Anxiety      Depression      Hypothyroidism              Past Surgical History             Past Surgical History:   Procedure Laterality Date    BREAST BIOPSY Left      PARTIAL HYSTERECTOMY Left       Left ovary gone due to endometriosis - has cervix and right ovary    US BREAST NEEDLE BIOPSY RIGHT Right 5/6/2021     US BREAST NEEDLE BIOPSY RIGHT 5/6/2021 [facility]            Current Facility-Administered Medications          Current Outpatient Medications   Medication Sig Dispense Refill    levothyroxine (SYNTHROID) 50 MCG tablet Take 1 tablet by mouth Daily 90 tablet 3    azelastine (ASTELIN) 0.1 % nasal spray 1 spray by Nasal route 2 times daily Use in each nostril as directed 2 Bottle 1    vitamin B-12 (CYANOCOBALAMIN) 100 MCG tablet Take 50 mcg by mouth daily        cetirizine (ZYRTEC) 10 MG tablet Take 10 mg by mouth daily          No current facility-administered medications for this visit.            Family History             Family History   Problem Relation Age of Onset    Cervical Cancer Sister      Uterine Cancer Sister           unsure         Ashkenazi Yarsanism Ancestry: No             Allergies   Allergen Reactions    Oxycodone-Acetaminophen Other (See Comments)             Social History                   Socioeconomic History    Marital status:        Spouse name: Not on file    Number of children: Not on file    Years of education: Not on file    Highest education level: Not on file   Occupational History    Not on file   Tobacco Use    Smoking status: Former Smoker       Packs/day: 1.00       Years: 10.00       Pack years: 10.00       Types: Cigarettes    Smokeless tobacco: Never Used   Substance and Sexual Activity    Alcohol use: Not Currently       Comment: margaret     Drug use: Never    Sexual activity: Not on file Other Topics Concern    Not on file   Social History Narrative    Not on file      Social Determinants of Health            Financial Resource Strain:     Difficulty of Paying Living Expenses:    Food Insecurity:     Worried About Running Out of Food in the Last Year:     [address] in the Last Year:    Transportation Needs:     Lack of Transportation (Medical):  Lack of Transportation (Non-Medical):    Physical Activity:     Days of Exercise per Week:     Minutes of Exercise per Session:    Stress:     Feeling of Stress :    Social Connections:     Frequency of Communication with Friends and Family:     Frequency of Social Gatherings with Friends and Family:     Attends Oriental orthodox Services:     Active Member of Clubs or Organizations:     Attends Club or Organization Meetings:     Marital Status:    Intimate Partner Violence:     Fear of Current or Ex-Partner:     Emotionally Abused:     Physically Abused:     Sexually Abused:              Ester Weber in a SNF     Review of Systems   Constitutional: Negative.  Negative for activity change, appetite change and unexpected weight change. HENT: Negative.    Eyes: Negative.    Respiratory: Negative.  Negative for cough and shortness of breath.    Cardiovascular: Negative.  Negative for chest pain and leg swelling. Gastrointestinal: Negative. Rita Carrillo for abdominal distention, abdominal pain, anal bleeding, blood in stool, constipation, diarrhea, nausea and vomiting. Endocrine: Negative.    Genitourinary: Negative.    Musculoskeletal: Positive for back pain (chronic). Negative for arthralgias, gait problem, joint swelling and myalgias. Skin: Negative.    Allergic/Immunologic: Negative.    Neurological: Negative. Rita Carrillo for dizziness, weakness and headaches.    Hematological: Negative.    Psychiatric/Behavioral: Negative.  Negative for confusion, decreased concentration and sleep disturbance.         ECOG PS:  0  Covid HISTORY:   ORDERING SYSTEM PROVIDED HISTORY: Abnormal mammogram   TECHNOLOGIST PROVIDED HISTORY:   Per [facility] protocol   Reason for exam:->right breast calcifications       FINDINGS:   Right diagnostic mammogram: Breast tissue is heterogeneously dense which may   obscure small masses.  There are scattered punctate, amorphous, and round   calcifications in the upper outer right breast, none of which are grouped.  A   focal asymmetry is also noted in the upper outer quadrant.       Right diagnostic ultrasound: Targeted right breast ultrasound was performed   utilizing high-resolution, real-time scanning.  There is a complex cystic and   solid mass in the right breast 9 o'clock 5 cm from the nipple measuring 8 x 6   x 8 mm.  It has an irregular shape with angular margins.  No axillary   lymphadenopathy seen. .           Impression   1.  Complex cystic and solid mass in the right breast 9 o'clock is suspicious   for neoplasm.       2.  Scattered punctate, amorphous, and round calcifications in the upper   outer right breast are benign.       Recommendation:       Ultrasound-guided core biopsy of the suspicious complex cystic and solid mass   in the right breast 9 o'clock is recommended.       BIRADS:   BIRADS - CATEGORY 4       Suspicious Abnormality.  Biopsy should be considered at this time.               ULTRASOUND:  EXAMINATION:   DIAGNOSTIC DIGITAL RIGHT BREAST MAMMOGRAM; TARGETED ULTRASOUND OF THE RIGHT   BREAST, 4/22/2021 2:57 pm       TECHNIQUE:   Diagnostic mammography of the right breast was performed.  Computer aided   detection was utilized in the interpretation of this exam.; Targeted   ultrasound of the right breast was performed.       Views: Right cc spot magnification and right mL spot magnification views       COMPARISON:   Multiple prior studies, the most recent dated April 14, 2021       HISTORY:   ORDERING SYSTEM PROVIDED HISTORY: Abnormal mammogram   TECHNOLOGIST PROVIDED HISTORY:   Per [facility] protocol   Reason for exam:->right breast calcifications       FINDINGS:   Right diagnostic mammogram: Breast tissue is heterogeneously dense which may   obscure small masses.  There are scattered punctate, amorphous, and round   calcifications in the upper outer right breast, none of which are grouped.  A   focal asymmetry is also noted in the upper outer quadrant.       Right diagnostic ultrasound: Targeted right breast ultrasound was performed   utilizing high-resolution, real-time scanning.  There is a complex cystic and   solid mass in the right breast 9 o'clock 5 cm from the nipple measuring 8 x 6   x 8 mm.  It has an irregular shape with angular margins.  No axillary   lymphadenopathy seen. .           Impression   1.  Complex cystic and solid mass in the right breast 9 o'clock is suspicious   for neoplasm.       2.  Scattered punctate, amorphous, and round calcifications in the upper   outer right breast are benign.       Recommendation:       Ultrasound-guided core biopsy of the suspicious complex cystic and solid mass   in the right breast 9 o'clock is recommended.       BIRADS:   BIRADS - CATEGORY 4      MRI BREAST:  EXAMINATION:   MRI OF THE BILATERAL BREASTS WITHOUT AND WITH CONTRAST 5/25/2021 9:58 am:       TECHNIQUE:   Multiplanar, multisequence MRI of the bilateral breasts was performed without   and with the administration of intravenous contrast.       Data analysis was performed with color parametric mapping, image subtraction,   and 3D reconstructions.       COMPARISON:   Multiple prior studies, the most recent dated May 6, 2021.       HISTORY:   ORDERING SYSTEM PROVIDED HISTORY: Malignant neoplasm of right breast in   female, estrogen receptor negative, unspecified site of breast ([facility] [address]   TECHNOLOGIST PROVIDED HISTORY:   Reason for exam:->to determine the extent of disease,       FINDINGS:   There is heterogeneous fibroglandular tissue with minimal background   parenchymal enhancement.  An irregular, enhancing mass is again noted in the   right breast 9 o'clock which measures 1.1 x 0.8 x 0.9 cm (image 58 of the   axial T1 post contrast series).  There is adjacent focal, heterogeneous non   mass enhancement located along the medial, lateral, anterior, and superior   aspects of the primary lesion.  Total extent of disease is approximately 3.3   x 2.5 x 5.0 cm.  No suspicious mass or non mass enhancement seen on the left. There is no lymphadenopathy.  Bilateral skin and nipple-areolar complexes are   normal.  Visualized chest and abdominal organs are unremarkable.           Impression   1.  Multifocal neoplasm in the lower outer quadrant of the right breast.       2.  There is no lymphadenopathy.       3.  No evidence of neoplasm in the left breast       RECOMMENDATION:       Continued surgical and oncologic consultation is advised.       BIRADS:   6: Known neoplasm      PATHOLOGY:  Diagnosis:   Right breast, 9:00, core biopsy: Invasive ductal carcinoma, grade 2. Breast Cancer Marker Studies:   Estrogen Receptors (ER): Negative         Internal control cells present and stain as expected: Yes     Progesterone Receptors (NH): Negative         Internal control cells present and stain as expected: Yes     Hormone receptor studies are performed by immunohistochemistry on   formalin-fixed, paraffin-embedded tissue (Roche Benchmark Immunostainer,   Primera anti-ER clone [id number], anti-NH clone [id number], polymer-based detection   chemistry). ER and NH are evaluated based on the percentage of cells   showing nuclear staining with >1% considered positive for each.      Her-2/alex (c-erb B-2) protein expression: Negative (0)      ASSESSMENT/PLAN:  Right breast cancer--TNBC, clinical prognostic stage IB, grade 2; likely DCIS component based on MRI findings  --CBC, CMP, CXR done and reviewed  --genetic testing--VUS-CDKN2A  --repeat MRI shows minimal response to chemotherapy    Due to patient's mental response to

## 2021-10-28 ENCOUNTER — HOSPITAL ENCOUNTER (OUTPATIENT)
Age: 56
Setting detail: OUTPATIENT SURGERY
Discharge: HOME | End: 2021-10-28
Attending: PLASTIC SURGERY | Admitting: PLASTIC SURGERY
Payer: COMMERCIAL

## 2021-10-28 ENCOUNTER — ANESTHESIA (OUTPATIENT)
Dept: OPERATING ROOM | Age: 56
End: 2021-10-28
Payer: COMMERCIAL

## 2021-10-28 ENCOUNTER — HOSPITAL ENCOUNTER (OUTPATIENT)
Dept: NUCLEAR MEDICINE | Age: 56
Discharge: HOME | End: 2021-10-30
Payer: COMMERCIAL

## 2021-10-28 VITALS
WEIGHT: 152 LBS | HEIGHT: 63 IN | SYSTOLIC BLOOD PRESSURE: 95 MMHG | HEART RATE: 85 BPM | DIASTOLIC BLOOD PRESSURE: 70 MMHG | BODY MASS INDEX: 26.93 KG/M2 | RESPIRATION RATE: 16 BRPM | TEMPERATURE: 97.6 F | OXYGEN SATURATION: 98 %

## 2021-10-28 VITALS — TEMPERATURE: 97.7 F | SYSTOLIC BLOOD PRESSURE: 115 MMHG | DIASTOLIC BLOOD PRESSURE: 73 MMHG | OXYGEN SATURATION: 100 %

## 2021-10-28 DIAGNOSIS — G89.18 POST-OP PAIN: ICD-10-CM

## 2021-10-28 DIAGNOSIS — C50.411 MALIGNANT NEOPLASM OF UPPER-OUTER QUADRANT OF RIGHT BREAST IN FEMALE, ESTROGEN RECEPTOR NEGATIVE (HCC): ICD-10-CM

## 2021-10-28 DIAGNOSIS — Z01.812 PRE-OPERATIVE LABORATORY EXAMINATION: Primary | ICD-10-CM

## 2021-10-28 DIAGNOSIS — Z17.1 MALIGNANT NEOPLASM OF UPPER-OUTER QUADRANT OF RIGHT BREAST IN FEMALE, ESTROGEN RECEPTOR NEGATIVE (HCC): ICD-10-CM

## 2021-10-28 LAB
ANION GAP SERPL CALCULATED.3IONS-SCNC: 13 MMOL/L (ref 7–16)
BUN BLDV-MCNC: 16 MG/DL (ref 6–20)
CALCIUM SERPL-MCNC: 10 MG/DL (ref 8.6–10.2)
CHLORIDE BLD-SCNC: 103 MMOL/L (ref 98–107)
CO2: 20 MMOL/L (ref 22–29)
CREAT SERPL-MCNC: 0.8 MG/DL (ref 0.5–1)
GFR AFRICAN AMERICAN: >60
GFR NON-AFRICAN AMERICAN: >60 ML/MIN/1.73
GLUCOSE BLD-MCNC: 93 MG/DL (ref 74–99)
HCT VFR BLD CALC: 40.4 % (ref 34–48)
HEMOGLOBIN: 13.4 G/DL (ref 11.5–15.5)
MCH RBC QN AUTO: 29.3 PG (ref 26–35)
MCHC RBC AUTO-ENTMCNC: 33.2 % (ref 32–34.5)
MCV RBC AUTO: 88.2 FL (ref 80–99.9)
PDW BLD-RTO: 12.3 FL (ref 11.5–15)
PLATELET # BLD: 266 E9/L (ref 130–450)
PMV BLD AUTO: 8.9 FL (ref 7–12)
POTASSIUM REFLEX MAGNESIUM: 5.9 MMOL/L (ref 3.5–5)
RBC # BLD: 4.58 E12/L (ref 3.5–5.5)
SODIUM BLD-SCNC: 136 MMOL/L (ref 132–146)
WBC # BLD: 5.5 E9/L (ref 4.5–11.5)

## 2021-10-28 PROCEDURE — 19357 TISS XPNDR PLMT BRST RCNSTJ: CPT | Performed by: PLASTIC SURGERY

## 2021-10-28 PROCEDURE — 6360000002 HC RX W HCPCS

## 2021-10-28 PROCEDURE — 3600000003 HC SURGERY LEVEL 3 BASE: Performed by: PLASTIC SURGERY

## 2021-10-28 PROCEDURE — 3700000001 HC ADD 15 MINUTES (ANESTHESIA): Performed by: PLASTIC SURGERY

## 2021-10-28 PROCEDURE — 2580000003 HC RX 258

## 2021-10-28 PROCEDURE — 38792 RA TRACER ID OF SENTINL NODE: CPT

## 2021-10-28 PROCEDURE — 3700000000 HC ANESTHESIA ATTENDED CARE: Performed by: PLASTIC SURGERY

## 2021-10-28 PROCEDURE — 19303 MAST SIMPLE COMPLETE: CPT | Performed by: PLASTIC SURGERY

## 2021-10-28 PROCEDURE — 88305 TISSUE EXAM BY PATHOLOGIST: CPT

## 2021-10-28 PROCEDURE — 19303 MAST SIMPLE COMPLETE: CPT | Performed by: PHYSICIAN ASSISTANT

## 2021-10-28 PROCEDURE — 6360000002 HC RX W HCPCS: Performed by: PLASTIC SURGERY

## 2021-10-28 PROCEDURE — 80048 BASIC METABOLIC PNL TOTAL CA: CPT

## 2021-10-28 PROCEDURE — 2580000003 HC RX 258: Performed by: SURGERY

## 2021-10-28 PROCEDURE — 6360000002 HC RX W HCPCS: Performed by: ANESTHESIOLOGY

## 2021-10-28 PROCEDURE — 88307 TISSUE EXAM BY PATHOLOGIST: CPT

## 2021-10-28 PROCEDURE — 3600000013 HC SURGERY LEVEL 3 ADDTL 15MIN: Performed by: PLASTIC SURGERY

## 2021-10-28 PROCEDURE — 15777 ACELLULAR DERM MATRIX IMPLT: CPT | Performed by: PLASTIC SURGERY

## 2021-10-28 PROCEDURE — A9520 TC99 TILMANOCEPT DIAG 0.5MCI: HCPCS | Performed by: RADIOLOGY

## 2021-10-28 PROCEDURE — 19303 MAST SIMPLE COMPLETE: CPT | Performed by: SURGERY

## 2021-10-28 PROCEDURE — 7100000001 HC PACU RECOVERY - ADDTL 15 MIN: Performed by: PLASTIC SURGERY

## 2021-10-28 PROCEDURE — 7100000010 HC PHASE II RECOVERY - FIRST 15 MIN: Performed by: PLASTIC SURGERY

## 2021-10-28 PROCEDURE — 7100000000 HC PACU RECOVERY - FIRST 15 MIN: Performed by: PLASTIC SURGERY

## 2021-10-28 PROCEDURE — 2720000010 HC SURG SUPPLY STERILE: Performed by: PLASTIC SURGERY

## 2021-10-28 PROCEDURE — 2500000003 HC RX 250 WO HCPCS: Performed by: PLASTIC SURGERY

## 2021-10-28 PROCEDURE — 38525 BIOPSY/REMOVAL LYMPH NODES: CPT | Performed by: SURGERY

## 2021-10-28 PROCEDURE — 6360000002 HC RX W HCPCS: Performed by: SURGERY

## 2021-10-28 PROCEDURE — C1713 ANCHOR/SCREW BN/BN,TIS/BN: HCPCS | Performed by: PLASTIC SURGERY

## 2021-10-28 PROCEDURE — 7100000011 HC PHASE II RECOVERY - ADDTL 15 MIN: Performed by: PLASTIC SURGERY

## 2021-10-28 PROCEDURE — 19357 TISS XPNDR PLMT BRST RCNSTJ: CPT | Performed by: PHYSICIAN ASSISTANT

## 2021-10-28 PROCEDURE — 85027 COMPLETE CBC AUTOMATED: CPT

## 2021-10-28 PROCEDURE — 2500000003 HC RX 250 WO HCPCS

## 2021-10-28 PROCEDURE — 2709999900 HC NON-CHARGEABLE SUPPLY: Performed by: PLASTIC SURGERY

## 2021-10-28 PROCEDURE — 38900 IO MAP OF SENT LYMPH NODE: CPT | Performed by: SURGERY

## 2021-10-28 PROCEDURE — 3430000000 HC RX DIAGNOSTIC RADIOPHARMACEUTICAL: Performed by: RADIOLOGY

## 2021-10-28 DEVICE — STIMULAN® RAPID CURE PROVIDED STERILE FOR SINGLE PATIENT USE. STIMULAN® RAPID CURE CONTAINS CALCIUM SULFATE POWDER AND MIXING SOLUTION IN PRE-MEASURED QUANTITIES SO THAT WHEN MIXED TOGETHER IN A STERILE MIXING BOWL, THE RESULTANT PASTE IS TO BE DIGITALLY PACKED INTO OPEN BONE VOID/GAP TO SET INSITU OR PLACED INTO THE MOULD PROVIDED, THE MIXTURE SETS TO FORM BEADS. THE BIODEGRADABLE, RADIOPAQUE BEADS ARE RESORBED IN APPROXIMATELY 30 – 60 DAYS WHEN USED IN ACCORDANCE WITH THE DEVICE LABELLING. STIMULAN® RAPID CURE IS MANUFACTURED FROM SYNTHETIC IMPLANT GRADE CALCIUM SULFATE DIHYDRATE(CASO4.2H2O) THAT RESORBS AND IS REPLACED WITH BONE DURING THE HEALING PROCESS. ALSO, AS THE BONE VOID FILLER BEADS ARE BIODEGRADABLE AND BIOCOMPATIBLE, THEY MAY BE USED AT AN INFECTED SITE.
Type: IMPLANTABLE DEVICE | Site: BREAST | Status: FUNCTIONAL
Brand: STIMULAN® RAPID CURE

## 2021-10-28 DEVICE — Z DISCONTINUED SEE COMMENTS IMPLANT HUM TISS W96XH1.04-2.28XL193MM ACELLULAR DERM TISS: Type: IMPLANTABLE DEVICE | Site: BREAST | Status: FUNCTIONAL

## 2021-10-28 RX ORDER — HYDROCODONE BITARTRATE AND ACETAMINOPHEN 5; 325 MG/1; MG/1
1 TABLET ORAL PRN
Status: DISCONTINUED | OUTPATIENT
Start: 2021-10-28 | End: 2021-10-28 | Stop reason: HOSPADM

## 2021-10-28 RX ORDER — SODIUM CHLORIDE 0.9 % (FLUSH) 0.9 %
5-40 SYRINGE (ML) INJECTION EVERY 12 HOURS SCHEDULED
Status: DISCONTINUED | OUTPATIENT
Start: 2021-10-28 | End: 2021-10-28 | Stop reason: SDUPTHER

## 2021-10-28 RX ORDER — SODIUM CHLORIDE 9 MG/ML
INJECTION, SOLUTION INTRAVENOUS CONTINUOUS PRN
Status: DISCONTINUED | OUTPATIENT
Start: 2021-10-28 | End: 2021-10-28 | Stop reason: SDUPTHER

## 2021-10-28 RX ORDER — CYCLOBENZAPRINE HCL 10 MG
10 TABLET ORAL 3 TIMES DAILY PRN
Qty: 10 TABLET | Refills: 0 | Status: SHIPPED | OUTPATIENT
Start: 2021-10-28 | End: 2022-01-14 | Stop reason: ALTCHOICE

## 2021-10-28 RX ORDER — HYDROCODONE BITARTRATE AND ACETAMINOPHEN 5; 325 MG/1; MG/1
2 TABLET ORAL PRN
Status: DISCONTINUED | OUTPATIENT
Start: 2021-10-28 | End: 2021-10-28 | Stop reason: HOSPADM

## 2021-10-28 RX ORDER — SODIUM CHLORIDE 9 MG/ML
INJECTION, SOLUTION INTRAVENOUS CONTINUOUS
Status: DISCONTINUED | OUTPATIENT
Start: 2021-10-28 | End: 2021-10-28 | Stop reason: HOSPADM

## 2021-10-28 RX ORDER — OXYCODONE HYDROCHLORIDE AND ACETAMINOPHEN 5; 325 MG/1; MG/1
1 TABLET ORAL EVERY 6 HOURS PRN
Qty: 15 TABLET | Refills: 0 | Status: SHIPPED | OUTPATIENT
Start: 2021-10-28 | End: 2021-11-04

## 2021-10-28 RX ORDER — GLYCOPYRROLATE 1 MG/5 ML
SYRINGE (ML) INTRAVENOUS PRN
Status: DISCONTINUED | OUTPATIENT
Start: 2021-10-28 | End: 2021-10-28 | Stop reason: SDUPTHER

## 2021-10-28 RX ORDER — SODIUM CHLORIDE 0.9 % (FLUSH) 0.9 %
5-40 SYRINGE (ML) INJECTION PRN
Status: DISCONTINUED | OUTPATIENT
Start: 2021-10-28 | End: 2021-10-28 | Stop reason: SDUPTHER

## 2021-10-28 RX ORDER — SODIUM CHLORIDE 9 MG/ML
25 INJECTION, SOLUTION INTRAVENOUS PRN
Status: DISCONTINUED | OUTPATIENT
Start: 2021-10-28 | End: 2021-10-28 | Stop reason: SDUPTHER

## 2021-10-28 RX ORDER — ONDANSETRON 2 MG/ML
INJECTION INTRAMUSCULAR; INTRAVENOUS PRN
Status: DISCONTINUED | OUTPATIENT
Start: 2021-10-28 | End: 2021-10-28 | Stop reason: SDUPTHER

## 2021-10-28 RX ORDER — MORPHINE SULFATE 2 MG/ML
1 INJECTION, SOLUTION INTRAMUSCULAR; INTRAVENOUS EVERY 5 MIN PRN
Status: DISCONTINUED | OUTPATIENT
Start: 2021-10-28 | End: 2021-10-28 | Stop reason: HOSPADM

## 2021-10-28 RX ORDER — LIDOCAINE HYDROCHLORIDE ANHYDROUS AND DEXTROSE MONOHYDRATE .4; 5 G/100ML; G/100ML
INJECTION, SOLUTION INTRAVENOUS CONTINUOUS PRN
Status: DISCONTINUED | OUTPATIENT
Start: 2021-10-28 | End: 2021-10-28 | Stop reason: SDUPTHER

## 2021-10-28 RX ORDER — METHYLENE BLUE 10 MG/ML
INJECTION INTRAVENOUS PRN
Status: DISCONTINUED | OUTPATIENT
Start: 2021-10-28 | End: 2021-10-28 | Stop reason: ALTCHOICE

## 2021-10-28 RX ORDER — ACETAMINOPHEN 650 MG
TABLET, EXTENDED RELEASE ORAL PRN
Status: DISCONTINUED | OUTPATIENT
Start: 2021-10-28 | End: 2021-10-28 | Stop reason: ALTCHOICE

## 2021-10-28 RX ORDER — KETAMINE HCL IN NACL, ISO-OSM 100MG/10ML
SYRINGE (ML) INJECTION PRN
Status: DISCONTINUED | OUTPATIENT
Start: 2021-10-28 | End: 2021-10-28 | Stop reason: SDUPTHER

## 2021-10-28 RX ORDER — MEPERIDINE HYDROCHLORIDE 25 MG/ML
12.5 INJECTION INTRAMUSCULAR; INTRAVENOUS; SUBCUTANEOUS EVERY 5 MIN PRN
Status: DISCONTINUED | OUTPATIENT
Start: 2021-10-28 | End: 2021-10-28 | Stop reason: HOSPADM

## 2021-10-28 RX ORDER — PROPOFOL 10 MG/ML
INJECTION, EMULSION INTRAVENOUS CONTINUOUS PRN
Status: DISCONTINUED | OUTPATIENT
Start: 2021-10-28 | End: 2021-10-28 | Stop reason: SDUPTHER

## 2021-10-28 RX ORDER — FENTANYL CITRATE 50 UG/ML
INJECTION, SOLUTION INTRAMUSCULAR; INTRAVENOUS PRN
Status: DISCONTINUED | OUTPATIENT
Start: 2021-10-28 | End: 2021-10-28 | Stop reason: SDUPTHER

## 2021-10-28 RX ORDER — PROPOFOL 10 MG/ML
INJECTION, EMULSION INTRAVENOUS PRN
Status: DISCONTINUED | OUTPATIENT
Start: 2021-10-28 | End: 2021-10-28 | Stop reason: SDUPTHER

## 2021-10-28 RX ORDER — PROMETHAZINE HYDROCHLORIDE 25 MG/ML
6.25 INJECTION, SOLUTION INTRAMUSCULAR; INTRAVENOUS EVERY 10 MIN PRN
Status: DISCONTINUED | OUTPATIENT
Start: 2021-10-28 | End: 2021-10-28 | Stop reason: HOSPADM

## 2021-10-28 RX ORDER — ROCURONIUM BROMIDE 10 MG/ML
INJECTION, SOLUTION INTRAVENOUS PRN
Status: DISCONTINUED | OUTPATIENT
Start: 2021-10-28 | End: 2021-10-28 | Stop reason: SDUPTHER

## 2021-10-28 RX ORDER — VANCOMYCIN HYDROCHLORIDE 500 MG/10ML
INJECTION, POWDER, LYOPHILIZED, FOR SOLUTION INTRAVENOUS PRN
Status: DISCONTINUED | OUTPATIENT
Start: 2021-10-28 | End: 2021-10-28 | Stop reason: ALTCHOICE

## 2021-10-28 RX ORDER — ONDANSETRON 4 MG/1
4 TABLET, FILM COATED ORAL DAILY PRN
Qty: 12 TABLET | Refills: 1 | Status: SHIPPED | OUTPATIENT
Start: 2021-10-28 | End: 2022-01-14 | Stop reason: ALTCHOICE

## 2021-10-28 RX ORDER — NEOSTIGMINE METHYLSULFATE 1 MG/ML
INJECTION, SOLUTION INTRAVENOUS PRN
Status: DISCONTINUED | OUTPATIENT
Start: 2021-10-28 | End: 2021-10-28 | Stop reason: SDUPTHER

## 2021-10-28 RX ORDER — MIDAZOLAM HYDROCHLORIDE 1 MG/ML
INJECTION INTRAMUSCULAR; INTRAVENOUS PRN
Status: DISCONTINUED | OUTPATIENT
Start: 2021-10-28 | End: 2021-10-28 | Stop reason: SDUPTHER

## 2021-10-28 RX ORDER — BUPIVACAINE HYDROCHLORIDE 2.5 MG/ML
INJECTION, SOLUTION EPIDURAL; INFILTRATION; INTRACAUDAL PRN
Status: DISCONTINUED | OUTPATIENT
Start: 2021-10-28 | End: 2021-10-28 | Stop reason: ALTCHOICE

## 2021-10-28 RX ORDER — SODIUM CHLORIDE 0.9 % (FLUSH) 0.9 %
10 SYRINGE (ML) INJECTION EVERY 12 HOURS SCHEDULED
Status: DISCONTINUED | OUTPATIENT
Start: 2021-10-28 | End: 2021-10-28 | Stop reason: HOSPADM

## 2021-10-28 RX ORDER — LIDOCAINE HYDROCHLORIDE 20 MG/ML
INJECTION, SOLUTION INTRAVENOUS PRN
Status: DISCONTINUED | OUTPATIENT
Start: 2021-10-28 | End: 2021-10-28 | Stop reason: SDUPTHER

## 2021-10-28 RX ORDER — CEPHALEXIN 500 MG/1
500 CAPSULE ORAL 4 TIMES DAILY
Qty: 20 CAPSULE | Refills: 0 | Status: SHIPPED | OUTPATIENT
Start: 2021-10-28 | End: 2021-11-02

## 2021-10-28 RX ORDER — GENTAMICIN SULFATE 40 MG/ML
INJECTION, SOLUTION INTRAMUSCULAR; INTRAVENOUS PRN
Status: DISCONTINUED | OUTPATIENT
Start: 2021-10-28 | End: 2021-10-28 | Stop reason: ALTCHOICE

## 2021-10-28 RX ORDER — DEXAMETHASONE SODIUM PHOSPHATE 10 MG/ML
INJECTION INTRAMUSCULAR; INTRAVENOUS PRN
Status: DISCONTINUED | OUTPATIENT
Start: 2021-10-28 | End: 2021-10-28 | Stop reason: SDUPTHER

## 2021-10-28 RX ORDER — SODIUM CHLORIDE 0.9 % (FLUSH) 0.9 %
10 SYRINGE (ML) INJECTION PRN
Status: DISCONTINUED | OUTPATIENT
Start: 2021-10-28 | End: 2021-10-28 | Stop reason: HOSPADM

## 2021-10-28 RX ORDER — SODIUM CHLORIDE 9 MG/ML
25 INJECTION, SOLUTION INTRAVENOUS PRN
Status: DISCONTINUED | OUTPATIENT
Start: 2021-10-28 | End: 2021-10-28 | Stop reason: HOSPADM

## 2021-10-28 RX ORDER — MORPHINE SULFATE 2 MG/ML
2 INJECTION, SOLUTION INTRAMUSCULAR; INTRAVENOUS EVERY 5 MIN PRN
Status: DISCONTINUED | OUTPATIENT
Start: 2021-10-28 | End: 2021-10-28 | Stop reason: HOSPADM

## 2021-10-28 RX ADMIN — FENTANYL CITRATE 50 MCG: 50 INJECTION, SOLUTION INTRAMUSCULAR; INTRAVENOUS at 13:32

## 2021-10-28 RX ADMIN — SODIUM CHLORIDE: 9 INJECTION, SOLUTION INTRAVENOUS at 11:58

## 2021-10-28 RX ADMIN — FENTANYL CITRATE 25 MCG: 50 INJECTION, SOLUTION INTRAMUSCULAR; INTRAVENOUS at 14:54

## 2021-10-28 RX ADMIN — LIDOCAINE HYDROCHLORIDE ANHYDROUS AND DEXTROSE MONOHYDRATE 2 MG/MIN: .4; 5 INJECTION, SOLUTION INTRAVENOUS at 12:13

## 2021-10-28 RX ADMIN — Medication 0.2 MG: at 14:58

## 2021-10-28 RX ADMIN — Medication 10 MG: at 14:28

## 2021-10-28 RX ADMIN — ROCURONIUM BROMIDE 40 MG: 10 INJECTION, SOLUTION INTRAVENOUS at 12:10

## 2021-10-28 RX ADMIN — MORPHINE SULFATE 2 MG: 2 INJECTION, SOLUTION INTRAMUSCULAR; INTRAVENOUS at 15:31

## 2021-10-28 RX ADMIN — FENTANYL CITRATE 100 MCG: 50 INJECTION, SOLUTION INTRAMUSCULAR; INTRAVENOUS at 12:10

## 2021-10-28 RX ADMIN — MIDAZOLAM 2 MG: 1 INJECTION INTRAMUSCULAR; INTRAVENOUS at 11:59

## 2021-10-28 RX ADMIN — LIDOCAINE HYDROCHLORIDE 80 MG: 20 INJECTION, SOLUTION INTRAVENOUS at 12:10

## 2021-10-28 RX ADMIN — ROCURONIUM BROMIDE 10 MG: 10 INJECTION, SOLUTION INTRAVENOUS at 13:20

## 2021-10-28 RX ADMIN — SODIUM CHLORIDE 1000 ML: 9 INJECTION, SOLUTION INTRAVENOUS at 09:04

## 2021-10-28 RX ADMIN — ONDANSETRON HYDROCHLORIDE 4 MG: 2 INJECTION, SOLUTION INTRAMUSCULAR; INTRAVENOUS at 14:20

## 2021-10-28 RX ADMIN — MORPHINE SULFATE 2 MG: 2 INJECTION, SOLUTION INTRAMUSCULAR; INTRAVENOUS at 16:05

## 2021-10-28 RX ADMIN — ROCURONIUM BROMIDE 10 MG: 10 INJECTION, SOLUTION INTRAVENOUS at 12:51

## 2021-10-28 RX ADMIN — Medication 10 MG: at 12:14

## 2021-10-28 RX ADMIN — MIDAZOLAM 2 MG: 1 INJECTION INTRAMUSCULAR; INTRAVENOUS at 12:42

## 2021-10-28 RX ADMIN — PROPOFOL 140 MG: 10 INJECTION, EMULSION INTRAVENOUS at 12:10

## 2021-10-28 RX ADMIN — TILMANOCEPT 0.5 MILLICURIE: KIT at 10:26

## 2021-10-28 RX ADMIN — SODIUM CHLORIDE: 9 INJECTION, SOLUTION INTRAVENOUS at 14:22

## 2021-10-28 RX ADMIN — Medication 10 MG: at 13:22

## 2021-10-28 RX ADMIN — PROPOFOL 150 MCG/KG/MIN: 10 INJECTION, EMULSION INTRAVENOUS at 12:13

## 2021-10-28 RX ADMIN — DEXAMETHASONE SODIUM PHOSPHATE 10 MG: 10 INJECTION INTRAMUSCULAR; INTRAVENOUS at 12:28

## 2021-10-28 RX ADMIN — FENTANYL CITRATE 50 MCG: 50 INJECTION, SOLUTION INTRAMUSCULAR; INTRAVENOUS at 12:33

## 2021-10-28 RX ADMIN — Medication 2000 MG: at 12:12

## 2021-10-28 RX ADMIN — FENTANYL CITRATE 25 MCG: 50 INJECTION, SOLUTION INTRAMUSCULAR; INTRAVENOUS at 14:41

## 2021-10-28 RX ADMIN — Medication 1 MG: at 14:58

## 2021-10-28 ASSESSMENT — PULMONARY FUNCTION TESTS
PIF_VALUE: 17
PIF_VALUE: 3
PIF_VALUE: 16
PIF_VALUE: 18
PIF_VALUE: 3
PIF_VALUE: 17
PIF_VALUE: 1
PIF_VALUE: 19
PIF_VALUE: 16
PIF_VALUE: 17
PIF_VALUE: 16
PIF_VALUE: 18
PIF_VALUE: 2
PIF_VALUE: 15
PIF_VALUE: 2
PIF_VALUE: 17
PIF_VALUE: 17
PIF_VALUE: 16
PIF_VALUE: 19
PIF_VALUE: 17
PIF_VALUE: 14
PIF_VALUE: 17
PIF_VALUE: 17
PIF_VALUE: 2
PIF_VALUE: 16
PIF_VALUE: 1
PIF_VALUE: 16
PIF_VALUE: 17
PIF_VALUE: 15
PIF_VALUE: 16
PIF_VALUE: 16
PIF_VALUE: 15
PIF_VALUE: 16
PIF_VALUE: 17
PIF_VALUE: 18
PIF_VALUE: 15
PIF_VALUE: 17
PIF_VALUE: 16
PIF_VALUE: 2
PIF_VALUE: 11
PIF_VALUE: 17
PIF_VALUE: 0
PIF_VALUE: 18
PIF_VALUE: 0
PIF_VALUE: 0
PIF_VALUE: 16
PIF_VALUE: 19
PIF_VALUE: 3
PIF_VALUE: 17
PIF_VALUE: 15
PIF_VALUE: 2
PIF_VALUE: 17
PIF_VALUE: 16
PIF_VALUE: 15
PIF_VALUE: 19
PIF_VALUE: 16
PIF_VALUE: 13
PIF_VALUE: 1
PIF_VALUE: 3
PIF_VALUE: 18
PIF_VALUE: 17
PIF_VALUE: 2
PIF_VALUE: 17
PIF_VALUE: 16
PIF_VALUE: 16
PIF_VALUE: 17
PIF_VALUE: 16
PIF_VALUE: 17
PIF_VALUE: 16
PIF_VALUE: 17
PIF_VALUE: 11
PIF_VALUE: 17
PIF_VALUE: 2
PIF_VALUE: 17
PIF_VALUE: 20
PIF_VALUE: 1
PIF_VALUE: 2
PIF_VALUE: 16
PIF_VALUE: 17
PIF_VALUE: 20
PIF_VALUE: 1
PIF_VALUE: 17
PIF_VALUE: 17
PIF_VALUE: 3
PIF_VALUE: 16
PIF_VALUE: 15
PIF_VALUE: 3
PIF_VALUE: 17
PIF_VALUE: 1
PIF_VALUE: 2
PIF_VALUE: 3
PIF_VALUE: 16
PIF_VALUE: 17
PIF_VALUE: 16
PIF_VALUE: 17
PIF_VALUE: 19
PIF_VALUE: 19
PIF_VALUE: 18
PIF_VALUE: 16
PIF_VALUE: 4
PIF_VALUE: 16
PIF_VALUE: 17
PIF_VALUE: 17
PIF_VALUE: 18
PIF_VALUE: 17
PIF_VALUE: 2
PIF_VALUE: 3
PIF_VALUE: 17
PIF_VALUE: 14
PIF_VALUE: 3
PIF_VALUE: 1
PIF_VALUE: 17
PIF_VALUE: 17
PIF_VALUE: 14
PIF_VALUE: 16
PIF_VALUE: 1
PIF_VALUE: 2
PIF_VALUE: 2
PIF_VALUE: 17
PIF_VALUE: 14
PIF_VALUE: 17
PIF_VALUE: 17
PIF_VALUE: 18
PIF_VALUE: 17
PIF_VALUE: 18
PIF_VALUE: 15
PIF_VALUE: 15
PIF_VALUE: 17
PIF_VALUE: 17
PIF_VALUE: 15
PIF_VALUE: 17
PIF_VALUE: 18
PIF_VALUE: 18
PIF_VALUE: 14
PIF_VALUE: 17
PIF_VALUE: 1
PIF_VALUE: 17
PIF_VALUE: 2
PIF_VALUE: 17
PIF_VALUE: 14
PIF_VALUE: 16
PIF_VALUE: 15
PIF_VALUE: 17
PIF_VALUE: 17
PIF_VALUE: 16
PIF_VALUE: 17
PIF_VALUE: 17
PIF_VALUE: 16
PIF_VALUE: 16
PIF_VALUE: 3
PIF_VALUE: 17
PIF_VALUE: 17
PIF_VALUE: 16
PIF_VALUE: 17
PIF_VALUE: 18
PIF_VALUE: 18
PIF_VALUE: 16
PIF_VALUE: 16
PIF_VALUE: 17
PIF_VALUE: 16
PIF_VALUE: 15
PIF_VALUE: 17
PIF_VALUE: 18
PIF_VALUE: 17
PIF_VALUE: 3
PIF_VALUE: 31
PIF_VALUE: 16

## 2021-10-28 ASSESSMENT — PAIN DESCRIPTION - LOCATION
LOCATION: CHEST;BREAST
LOCATION: CHEST;BREAST

## 2021-10-28 ASSESSMENT — PAIN SCALES - GENERAL
PAINLEVEL_OUTOF10: 0
PAINLEVEL_OUTOF10: 8
PAINLEVEL_OUTOF10: 0
PAINLEVEL_OUTOF10: 8
PAINLEVEL_OUTOF10: 0

## 2021-10-28 ASSESSMENT — PAIN DESCRIPTION - DESCRIPTORS
DESCRIPTORS: ACHING;DISCOMFORT
DESCRIPTORS: ACHING;DISCOMFORT

## 2021-10-28 ASSESSMENT — PAIN DESCRIPTION - PAIN TYPE
TYPE: SURGICAL PAIN
TYPE: SURGICAL PAIN

## 2021-10-28 ASSESSMENT — PAIN - FUNCTIONAL ASSESSMENT: PAIN_FUNCTIONAL_ASSESSMENT: 0-10

## 2021-10-28 ASSESSMENT — LIFESTYLE VARIABLES: SMOKING_STATUS: 0

## 2021-10-28 NOTE — OP NOTE
Operative Note      Patient: Harriett Adams  YOB: 1965  MRN: [id number]    Date of Procedure: 10/28/2021    Pre-Op Diagnosis: RIGHT BREAST CANCER    Post-Op Diagnosis: Same       Procedure(s):  LEFT PROPHYLACTIC MASTECTOMY WITH BILTATER FIRST STAGE RECONSTRUCTION USING TISSUE EXPANDER AND ALLODERM    Surgeon(s):  Kathleen Amaro MD      Assistant:   Physician Assistant: TISH Caputo      Anesthesia: General    Estimated Blood Loss (mL): 737ZD    Complications: None    Specimens:   ID Type Source Tests Collected by Time Destination   A : LEFT BREAST (675.8 GRAMS) Tissue Tissue SURGICAL PATHOLOGY Kathleen Amaro MD 10/28/2021 1319    B : RIGHT BREAST (631.9 G) Tissue Tissue SURGICAL PATHOLOGY Kathleen Amaro MD 10/28/2021 1423    C : SENTINEL LYMPH NODE #1- Ned Montez, #440) Tissue Tissue SURGICAL PATHOLOGY Kathleen Amaro MD 10/28/2021 1355    D : RIGHT AXILLARY CONTENTS Tissue Tissue SURGICAL PATHOLOGY Kathleen Amaro MD 10/28/2021 1406        Implants:  Implant Name Type Inv. Item Serial No.  Lot No. LRB No. Used Action   IMPLANT HUM TISS [id number] TISS  IMPLANT HUM TISS [id number]. [address] [id number] Right 1 Implanted   IMPLANT HUM TISS [id number] TISS  IMPLANT HUM TISS [id number]. [address] [id number] Left 1 Implanted   GRAFT BNE SUB 10CC BEAD 25CC CA SULPHATE RAP SET W/ INDIV  GRAFT BNE SUB 10CC BEAD 25CC CA SULPHATE RAP SET W/ INDIV  [employer]- [id number] Left 1 Implanted   MENTOR CPX 4 MEDIUM HEIGHT BREAST EXPANDER, REF# [id number]   [id number]  [id number] Left 1 Implanted   MENTOR CPX 4 MEDIUM HEIGHT BREAST Missy Rosanne, REF# [id number]   [id number]  [id number] Right 1 Implanted         Drains:   Closed/Suction Drain Lateral;Left Chest Bulb [address] (Active)       Closed/Suction Drain Right Breast (Active)           Detailed Description of Procedure:            ASSISTANT:  Danuta Escobedo PA-C. PA was required for the case due  to lack of adequately trained assistant; was involved in prepping,  draping, retracting, dressing and suturing. SPECIMEN:  Bilateral breast skin and tissue. Resected weight on the left  side 675.9 gm, on the right side 631.9 gm. IMPLANTS:    Right: Bennett CPX 650cc Tissue Expander. Serial number: [id number]  Left: Bennett  cc Tissue Expander. Serial number: [id number]        PREOPERATIVE INDICATIONS:  The patient is a 64 y.o. female with history of right breast cancer. The patient elected to proceed with bilateral mastectomies with bilateral first stage breast reconstruction, placement of tissue expander and AlloDerm. Risks, benefits, and alternatives were explained to her including but not limited to bleeding, scarring, infection, asymmetries, implant Failure, anesthesia related complications, death, and need for further surgery. She understood and elected to proceed. She understands the nipple and areola would be removed. She was seen the day of surgery, marked, and all questions were answered. OPERATIVE PROCEDURE:  She was taken back to the operating room, placed in the supine position. SCDs were on and functioning at the time of induction of anesthesia. Preoperative antibiotics were given prior to incision. The area was prepped and draped in the usual sterile fashion with chlorhexidine prep stick following a wash with chlorhexidine Jerzy wipes. Elliptical excisions were designed around the nipple and areolar complex at a length of 13 cm. Attention was first turned to the left mastectomy. PEAK PlasmaBlade was used to incise full thickness through the skin with the aid of mastectomy hooks. Mauro's ligaments were identified and followed circumferentially around the breast down to the chest wall.   The breast was then lifted off of the underlying pectoralis major muscle which included the pectoralis fascia. Dissection was carried in the retro-pectoral plane medially to 1.5 cm from midline, cephalically to just below the clavicle. The muscle was then disinserted on its inferior aspect to allow for cephalic reflection of the muscle. The wound was irrigated copiously with normal saline. Visual inspection of the capsule yielded no suspicious lesions. AlloDerm was soaked for greater than 3 minutes in normal saline of the contour fenestrated type, then wash in sterile Betadine followed by wash in sterile saline of 3 liters. In meanwhile, antibiotic beads of the Stimulan system were also created consisting of vancomycin and gentamicin and these were set aside. The AlloDerm was tailored with facelift scissors and was sutured along the inframammary fold area and along the anterior axillary line. At this point in time, based on chest width, a 650 mL tissue expander was chosen. It was checked for leaks, expunged of all air, and soaked in antibiotic-containing saline. After changing with double gloves and under sterile technique, the tissue expander was placed into the subpectoral pocket in the medial and inferiormost position. Half of the Stimulan beads were placed in the retropectoral pocket. The interface between the muscle and the AlloDerm was closed with a 2-0 PDS running horizontal mattress suture. A magnetic  was used to fill the tissue expander to an acceptable safe level of 400 mL. Once that was completed, a 19-Amharic round Bettina Cea hubless drain was placed, sutured in place with a 2-0 Prolene. 20cc of 0.25% Marcaine was placed topically within the wound. The wound was closed with a 3-0 Monocryl deep dermal and 4-0 Monocryl Stratafix running subcuticular suture. Once Dr. Alfredo Maldonado was done with her portion of the case, the reconstruction was performed in the exact same manner on the contralateral side.  Dermabond was applied followed by 1-inch Steri-Strips, Kerlix fluffs, and a surgical bra. She emerged from anesthesia without complication and taken to PACU in good condition.       Electronically signed by Dusty Cheung MD on 10/28/2021 at 3:04 PM

## 2021-10-28 NOTE — OP NOTE
[facility]  OPERATIVE REPORT    Macey Jacobson  1965      DATE OF PROCEDURE: 10/28/2021     SURGEON: Shabana Diaz MD, MSc, FACS     ASSISTANT: Jevon Gama MD, PGY-II     PREOPERATIVE DIAGNOSIS:  Right breast cancer, TNBC, grade 2, Stage IB. POSTOPERATIVE DIAGNOSIS: Same    OPERATION: right simple mastectomy with sentinel lymph node biopsy, methylene blue dye injection     ANESTHESIA: GETA     ESTIMATED BLOOD LOSS: 920NW     COMPLICATIONS: None    SPECIMEN:  Right breast, sentinel lymph node #1 (440, blue), axillary contents    DRAINS:  Per Dr. Guy Benítez    HISTORY:  This is a 64 y. o.female who presented with a right breast mass. We did metastatic work-up and an MRI which showed a larger mass then visualized on mammogram up to 5 cm in size. She is triple negative breast cancer and was a candidate for neoadjuvant chemotherapy. She is completed that and had a follow-up MRI which shows no response to the chemotherapy. She was consented for right mastectomy with sentinel lymph node biopsy. She also saw Dr. Guy Benítez she is can perform a left simple mastectomy and bilateral tissue expanders. Please see Dr. Xenia Massey note for his portion of the case. DESCRIPTION OF PROCEDURE: The patient was identified and the procedure was confirmed. Ancef 2 g IV was given, bilateral SCDs were in place, lower bear hugger applied. Patient was prepped and draped in sterile fashion. We injected 5 cc of full-strength methylene blue dye in the retroareolar space and massaged the breast for 5 minutes. Using PEAK PlasmaBlade we made elliptical incision around the nipple areolar complex. We took the breast off the subcutaneous tissue going superiorly to the clavicle medially to the sternum inferiorly to the rectus abdominis taking the inframammary fold and laterally to the axilla. We took the breast off the pectoralis major muscle taking the fascia with it.   We achieved hemostasis along the way with clip applier and the PEAK PlasmaBlade. Once the breast was removed I marked it on the back table using the Vector marking kit with 6 colors of paint in the usual fashion. We then accessed the right axilla and using the true node device found a lymph node that highlighted and was also blue. We dissected out using the harmonic scalpel once it was removed and had a count of 440 and was blue. We sent this off his lymph node #1. I then took a good look at the axilla and level 1 and level 2 I had a lymph node that was inferior that felt firm this was dissected free and had no counts this was sent off as axillary contents and then had some tissue up in the level 2 space that appeared to have counts I removed it but it had no counts this was sent off as axillary contents I kept looking intermittently the counts would rise but I could not find any palpable nodes that can see any blue nodes I could not find any further tissue that would consistently have counts. We terminated our portion of the case at that point was sent 1 sentinel lymph node and axillary contents. We irrigated the space and achieved hemostasis and then turned the case over to Dr. Carlita Calvillo. Needle, sponge, and instrument counts were reported as correct x2. The patient tolerated the procedure and was transferred to the recovery area in satisfactory condition. I updated family at the end of my case.       Agent(s) utilized for sentinel lymph node identification: Blue dye and Radioactive tracer  Agent(s) utilized for sentinel lymph node identification after neoadjuvant chemotherapy: Blue dye, Radioactive tracer and patient did not have positive lymph nodes prior to chemo  All colored nodes or non-colored nodes present at the end of a dye-filled lymphatic channel were removed: yes  All significantly radioactive nodes were removed if radionuclide was used: yes  All palpably suspicious nodes were removed, if present: Yes  If clips were placed preoperatively in pathologically-involved nodes, those nodes were identified and removed: Ed Elizondo MD, MSc, FACS  10/28/2021  3:00 PM

## 2021-10-28 NOTE — ANESTHESIA POSTPROCEDURE EVALUATION
Department of Anesthesiology  Postprocedure Note    Patient: Ricardo Mckeon  MRN: [id number]  YOB: 1965  Date of evaluation: 10/28/2021  Time:  4:33 PM     Procedure Summary     Date: 10/28/21 Room / Location: [facility] OR 05 / [facility]    Anesthesia Start: 1158 Anesthesia Stop:     Procedures:       LEFT PROPHYLACTIC MASTECTOMY WITH BILTATER FIRST STAGE RECONSTRUCTION USING TISSUE EXPANDER AND ALLODERM (Bilateral Breast)      RIGHT BREAST MASTECTOMY WITH SENTINEL LYMPH NODE BIOPSY POSSIBLE AXILLARY LYMPH NODE DISSECTION - Lio Chaparro (Right ) Diagnosis: (RIGHT BREAST CANCER)    Surgeons: Simeon Ya MD; [place] MD Responsible Provider: Darryl Erickson MD    Anesthesia Type: general ASA Status: 3          Anesthesia Type: general    Dhiraj Phase I: Dhiraj Score: 9    Dhiraj Phase II: Dhiraj Score: 9    Last vitals: Reviewed and per EMR flowsheets.        Anesthesia Post Evaluation    Patient location during evaluation: PACU  Patient participation: complete - patient participated  Level of consciousness: awake  Pain score: 3  Airway patency: patent  Nausea & Vomiting: no nausea and no vomiting  Complications: no  Cardiovascular status: blood pressure returned to baseline  Respiratory status: acceptable  Hydration status: euvolemic

## 2021-10-28 NOTE — ANESTHESIA PRE PROCEDURE
Department of Anesthesiology  Preprocedure Note       Name:  Domenica Mann   Age:  64 y.o.  :  1965                                          MRN:  [id number]         Date:  10/28/2021      Surgeon: Rossy Fregoso):  MD Fawn Forrester MD    Procedure: Procedure(s):  LEFT PROPHYLACTIC MASTECTOMY WITH BILTATER FIRST STAGE RECONSTRUCTION USING TISSUE EXPANDER AND ALLODERM  RIGHT BREAST MASTECTOMY WITH SENTINEL LYMPH NODE BIOPSY POSSIBLE AXILLARY LYMPH NODE DISSECTION - [facility]    Medications prior to admission:   Prior to Admission medications    Medication Sig Start Date End Date Taking?  Authorizing Provider   Ascorbic Acid (VITAMIN C) 250 MG tablet Take 500 mg by mouth daily   Yes Historical Provider, MD   loratadine (CLARITIN) 10 MG tablet Take 10 mg by mouth daily   Yes Historical Provider, MD   levothyroxine (SYNTHROID) 50 MCG tablet Take 1 tablet by mouth Daily 1/15/21  Yes Monique Crocker DO   azelastine (ASTELIN) 0.1 % nasal spray 1 spray by Nasal route 2 times daily Use in each nostril as directed 20  Yes Carmen Gonzales,    ondansetron (ZOFRAN) 8 MG tablet TAKE 1 TABLET BY MOUTH THREE TIMES DAILY AS NEEDED FOR NAUSEA AND VOMITING  Patient not taking: Reported on 10/13/2021 8/5/21   Historical Provider, MD   dexamethasone (DECADRON) 4 MG tablet TAKE 2 TABLETS BY MOUTH TWICE DAILY TAKE THE DAY BEFORE THE DAY OF AND THE DAY AFTER DOCETAXEL  Patient not taking: Reported on 10/13/2021 8/5/21   Historical Provider, MD   APPLE CIDER VINEGAR PO Take by mouth    Historical Provider, MD   FIBER PO Take by mouth    Historical Provider, MD   vitamin B-12 (CYANOCOBALAMIN) 100 MCG tablet Take 50 mcg by mouth daily    Historical Provider, MD       Current medications:    Current Facility-Administered Medications   Medication Dose Route Frequency Provider Last Rate Last Admin    0.9 % sodium chloride infusion   IntraVENous Continuous [place] MD        0.9 % sodium chloride infusion 25 mL IntraVENous PRN Fred Burr  mL/hr at 10/28/21 0904 1,000 mL at 10/28/21 0904    ceFAZolin (ANCEF) 2000 mg in sterile water 20 mL IV syringe  2,000 mg IntraVENous On Call to [address], MD        sodium chloride flush 0.9 % injection 10 mL  10 mL IntraVENous 2 times per day Fred Burr MD        sodium chloride flush 0.9 % injection 10 mL  10 mL IntraVENous PRN Fred Burr MD         Facility-Administered Medications Ordered in Other Encounters   Medication Dose Route Frequency Provider Last Rate Last Admin    technetium Tc 99m tilmanocept (LYMPHOSEEK) injection 0.5 millicurie  456 micro curie Injection ONCE PRN Serena Encinas MD   0.5 millicurie at  3141       Allergies:     Allergies   Allergen Reactions    Oxycodone-Acetaminophen Nausea And Vomiting       Problem List:    Patient Active Problem List   Diagnosis Code    Acquired hypothyroidism E03.9    Malignant neoplasm of upper-outer quadrant of right breast in female, estrogen receptor negative ([facility] C50.411, Z17.1       Past Medical History:        Diagnosis Date    Anxiety     Cancer ([facility] [address] 2021    breast     Depression     History of cancer chemotherapy 2021-2021    Hypothyroidism        Past Surgical History:        Procedure Laterality Date    BREAST BIOPSY Left     PARTIAL HYSTERECTOMY Left     Left ovary gone due to endometriosis - has cervix and right ovary    PORT SURGERY Left 2021    MEDI PORT INSERTION LEFT SIDE PLACEMENT performed by Fred Burr MD at [address] [place] [address]. Right 2021     BREAST NEEDLE BIOPSY RIGHT 2021 SHEREE GUADARRAMA [facility]       Social History:    Social History     Tobacco Use    Smoking status: Former Smoker     Packs/day: 1.00     Years: 10.00     Pack years: 10.00     Types: Cigarettes     Quit date:      Years since quittin.8    Smokeless tobacco: Never Used   Substance Use Topics    Alcohol use: Not Currently Counseling given: Not Answered      Vital Signs (Current):   Vitals:    10/22/21 0846 10/28/21 0838   BP:  (!) 144/82   Pulse:  88   Resp:  20   Temp:  99 °F (37.2 °C)   TempSrc:  Temporal   SpO2:  99%   Weight: 152 lb (68.9 kg) 152 lb (68.9 kg)   Height: 5' 3\" (1.6 m) 5' 3\" (1.6 m)                                              BP Readings from Last 3 Encounters:   10/28/21 (!) 144/82   10/13/21 (!) 142/100   08/25/21 104/70       NPO Status: Time of last liquid consumption: 2200                        Time of last solid consumption: 1900                        Date of last liquid consumption: 10/27/21                        Date of last solid food consumption: 10/27/21    BMI:   Wt Readings from Last 3 Encounters:   10/28/21 152 lb (68.9 kg)   10/13/21 152 lb (68.9 kg)   08/25/21 150 lb (68 kg)     Body mass index is 26.93 kg/m². CBC:   Lab Results   Component Value Date    WBC 5.5 10/28/2021    RBC 4.58 10/28/2021    HGB 13.4 10/28/2021    HCT 40.4 10/28/2021    MCV 88.2 10/28/2021    RDW 12.3 10/28/2021     10/28/2021       CMP:   Lab Results   Component Value Date     10/28/2021    K 5.9 10/28/2021     10/28/2021    CO2 20 10/28/2021    BUN 16 10/28/2021    CREATININE 0.8 10/28/2021    GFRAA >60 10/28/2021    LABGLOM >60 10/28/2021    GLUCOSE 93 10/28/2021    PROT 7.0 08/13/2021    CALCIUM 10.0 10/28/2021    BILITOT 0.5 08/13/2021    ALKPHOS 155 08/13/2021    AST 12 08/13/2021    ALT 10 08/13/2021       POC Tests: No results for input(s): POCGLU, POCNA, POCK, POCCL, POCBUN, POCHEMO, POCHCT in the last 72 hours.     Coags: No results found for: PROTIME, INR, APTT    HCG (If Applicable): No results found for: PREGTESTUR, PREGSERUM, HCG, HCGQUANT     ABGs: No results found for: PHART, PO2ART, OYL8UEP, XRH4TQU, BEART, B6XXDPSP     Type & Screen (If Applicable):  No results found for: LABABO, LABRH    Drug/Infectious Status (If Applicable):  No results found for: HIV, HEPCAB    COVID-19 Screening (If Applicable): No results found for: COVID19    ECG 8/13/21  Narrative & Impression    Normal sinus rhythm  Normal ECG  No previous ECGs available  Confirmed by Robert Hayward ([id number]) on 8/13/2021 4:06:52 PM       10/12/21                               Any Other     Procedure     Type of Study      TTE procedure:Echo Complete W/Doppler & Color Flow. Procedure Date  Date: 10/12/2021 Start: 09:39 AM     Study Location: Echo Lab  Technical Quality: Adequate visualization     Indications:LV function and Post - Chemotherapy.     Patient Status: Routine     Height: 63 inches Weight: 150 pounds BSA: 1.71 m^2 BMI: 26.57 kg/m^2     HR: 85 bpm BP: 104/70 mmHg      Findings      Left Ventricle   Normal left ventricular chamber size. Normal left ventricular systolic   function. Visually estimated LVEF is 60 %. No wall motion abnormalities. Normal diastolic function. Normal left atrial pressure. Right Ventricle   Normal right ventricle structure and function. Left Atrium   Normal left atrium. Right Atrium   Normal right atrium. Mitral Valve   Physiologic and/or trace mitral regurgitation is present. No evidence of hemodynamically significant mitral stenosis. Tricuspid Valve   Normal tricuspid valve structure and function. Aortic Valve   Normal leaflet mobility. No aortic stenosis. No aortic regurgitation. Pulmonic Valve   Normal pulmonic valve structure and function. Pericardial Effusion   No evidence for hemodynamically significant pericardial effusion. Pleural Effusion   No evidence of pleural effusion. Aorta   Normal aortic root and ascending aorta. Miscellaneous   The inferior vena cava diameter is normal with normal respiratory   variation. Conclusions      Summary   Normal left ventricular chamber size. Normal left ventricular systolic   function. Visually estimated LVEF is 60 %. No wall motion abnormalities.    Normal diastolic function. Normal left atrial pressure. Normal right ventricle structure and function. Normal left atrium. Normal right atrium. Less than mild valvular stenosis/ regurgitation. Unable to estimate Pa pressure. Global longitudinal strain analysis is normal at -24.2%. No comparison study available. Anesthesia Evaluation  Patient summary reviewed and Nursing notes reviewed no history of anesthetic complications:   Airway: Mallampati: II  TM distance: >3 FB   Neck ROM: full  Mouth opening: > = 3 FB Dental:    (+) implants      Pulmonary:Negative Pulmonary ROS and normal exam  breath sounds clear to auscultation      (-) not a current smoker                           Cardiovascular:Negative CV ROS  Exercise tolerance: good (>4 METS),         ECG reviewed  Rhythm: regular  Rate: normal  Echocardiogram reviewed         Beta Blocker:  Not on Beta Blocker         Neuro/Psych:   Negative Neuro/Psych ROS              GI/Hepatic/Renal: Neg GI/Hepatic/Renal ROS            Endo/Other:    (+) hypothyroidism::., malignancy/cancer (  breast ). ROS comment: Procedures (1 of 2)  LEFT PROPHYLACTIC MASTECTOMY WITH BILTATER FIRST STAGE RECONSTRUCTION USING TISSUE EXPANDER AND ALLODERM (Bilateral Breast)   Abdominal:             Vascular: negative vascular ROS. Other Findings:           Anesthesia Plan      general     ASA 3     (IV left hand)  Induction: intravenous. BIS  MIPS: Postoperative opioids intended, Prophylactic antiemetics administered and Postoperative trial extubation. Anesthetic plan and risks discussed with patient. Use of blood products discussed with patient whom consented to blood products. Plan discussed with attending and CRNA.                 Sathya Alvarado MD   10/28/2021

## 2021-10-28 NOTE — INTERVAL H&P NOTE
Update History & Physical    The patient's History and Physical of October 27, 2021 was reviewed with the patient and I examined the patient. There was no change. The surgical site was confirmed by the patient and me. Plan: The risks, benefits, expected outcome, and alternative to the recommended procedure have been discussed with the patient. Patient understands and wants to proceed with the procedure.      Electronically signed by Renetta Guzman MD on 10/28/2021 at 11:54 AM

## 2021-10-29 NOTE — PROGRESS NOTES
Subjective: Follow up today from left prophylactic mastectomy to my bilateral for stage breast reconstruction using tissue expanders and AlloDerm. Denies fever, nausea, vomiting, leg pain or swelling, pain is mild. The pt states she is finishd taking her antibiotic and continues to wear her surgical bra. She voices no complaints with BORA drain management. She is ambulating at home. She is  wearing her surgical bra at all times. She is recording her BORA drain output and does present today with a list of the output volume. Objective: There were no vitals taken for this visit. Left Breast- Clean, dry, and intact, no drainage. no signs of infection. BORA drain intact without signs of infection, output is appropriate color and volume. >30cc/24 hours  Steri strips intact,    Right Breast- Clean, dry, and intact, no drainage. Steri strips intact, no signs of infection. BORA drain intact without signs of infection, output is appropriate color and volume. >30cc/24 hours  Steri strips intact,    Assessment:    Patient Active Problem List   Diagnosis    Acquired hypothyroidism    Malignant neoplasm of upper-outer quadrant of right breast in female, estrogen receptor negative ([facility] [address]    Pre-operative laboratory examination       Plan:     Status post first stage breast reconstruction    -Continue wearing surgical bra at all times  -Continue to record BORA drain output every 8 hours  -Educated the pt that her pain is proportionate to the amount of surgery she had and she may begin using Ibuprofen in addition to pain medication. -ABX to completion  -Pain control PRN  -No driving while taking narcotic pain medication or while impaired second to limited UE ROM  -No heavy lifting at this time  -OK to shower at this time. Advised the patient that they can allow soap and water to rinse of the incision site while showering.   Once they are done in the shower they are to pat dry the incision site with a clean paper towel.  No baths, hot tubs or soaking of the wound site at this time. Pt voices understanding. Left Expander- 650ml  Right Expander- 650ml    Left Prefill- 400ml  Right Prefill- 400ml  Area cleansed with alcohol after locating port magnetic port finder. 23 gauge needle introduced until metal backing was felt. 60cc to right breast, and 60cc to left breast filled. A bandaid was placed. Patient tolerated well. Left Postfill- 460ml  Right Postfill- 460ml    F/U in 1 week. Call office with concerns or signs of infection.     TISH Valente

## 2021-11-03 ENCOUNTER — TELEPHONE (OUTPATIENT)
Dept: SURGERY | Age: 56
End: 2021-11-03

## 2021-11-03 NOTE — TELEPHONE ENCOUNTER
I called patient and went over her pathology. All questions answered. She does NOT need radiation. She has a f/u appointment with Dr. Patsy Libman tomorrow. We need to make a f/u appointment, but it probably won't be until December.

## 2021-11-04 ENCOUNTER — OFFICE VISIT (OUTPATIENT)
Dept: SURGERY | Age: 56
End: 2021-11-04

## 2021-11-04 VITALS — HEIGHT: 63 IN | TEMPERATURE: 96.6 F | BODY MASS INDEX: 26.93 KG/M2 | WEIGHT: 152 LBS

## 2021-11-04 DIAGNOSIS — Z17.1 MALIGNANT NEOPLASM OF UPPER-OUTER QUADRANT OF RIGHT BREAST IN FEMALE, ESTROGEN RECEPTOR NEGATIVE (HCC): Primary | ICD-10-CM

## 2021-11-04 DIAGNOSIS — C50.411 MALIGNANT NEOPLASM OF UPPER-OUTER QUADRANT OF RIGHT BREAST IN FEMALE, ESTROGEN RECEPTOR NEGATIVE (HCC): Primary | ICD-10-CM

## 2021-11-04 DIAGNOSIS — Z98.890 S/P BREAST RECONSTRUCTION: ICD-10-CM

## 2021-11-04 PROCEDURE — 99024 POSTOP FOLLOW-UP VISIT: CPT | Performed by: PHYSICIAN ASSISTANT

## 2021-11-04 RX ORDER — CYCLOBENZAPRINE HCL 10 MG
5 TABLET ORAL 3 TIMES DAILY PRN
Qty: 15 TABLET | Refills: 0 | Status: SHIPPED
Start: 2021-11-04 | End: 2022-01-14 | Stop reason: ALTCHOICE

## 2021-11-04 NOTE — TELEPHONE ENCOUNTER
MA scheduled patient post op for 12/3/21 @ 10am in [facility] with .          Electronically signed by Darron Simmonds, MA on 11/4/21 at 7:58 AM EDT

## 2021-11-12 ENCOUNTER — OFFICE VISIT (OUTPATIENT)
Dept: SURGERY | Age: 56
End: 2021-11-12

## 2021-11-12 VITALS
HEIGHT: 63 IN | WEIGHT: 152 LBS | HEART RATE: 94 BPM | RESPIRATION RATE: 16 BRPM | BODY MASS INDEX: 26.93 KG/M2 | TEMPERATURE: 96.1 F | OXYGEN SATURATION: 100 %

## 2021-11-12 DIAGNOSIS — Z98.890 S/P BREAST RECONSTRUCTION: Primary | ICD-10-CM

## 2021-11-12 PROCEDURE — 99024 POSTOP FOLLOW-UP VISIT: CPT | Performed by: PLASTIC SURGERY

## 2021-11-12 NOTE — PROGRESS NOTES
Tissue Expander(s):  López 0.9% Sodium Chloride Injection USP   lot#   [id number]        Exp:   6/22        [facility]:[id number]

## 2021-11-17 ENCOUNTER — OFFICE VISIT (OUTPATIENT)
Dept: SURGERY | Age: 56
End: 2021-11-17

## 2021-11-17 DIAGNOSIS — Z98.890 S/P BREAST RECONSTRUCTION: Primary | ICD-10-CM

## 2021-11-17 PROCEDURE — 99024 POSTOP FOLLOW-UP VISIT: CPT | Performed by: PLASTIC SURGERY

## 2021-11-17 NOTE — PROGRESS NOTES
Subjective: Follow up today from left prophylactic mastectomy to my bilateral for stage breast reconstruction using tissue expanders and AlloDerm. Denies fever, nausea, vomiting, leg pain or swelling, pain is mild. The pt states she is finishd taking her antibiotic and continues to wear her surgical bra. She voices no complaints with BORA drain management. She is ambulating at home. She is  wearing her surgical bra at all times. She is recording her BORA drain output and does present today with a list of the output volume. Objective:    Pulse 94   Temp 96.1 °F (35.6 °C) (Infrared)   Resp 16   Ht 5' 3\" (1.6 m)   Wt 152 lb (68.9 kg)   SpO2 100%   BMI 26.93 kg/m²       Left Breast- Clean, dry, and intact, no drainage. no signs of infection. BORA drain intact without signs of infection, output is appropriate color and volume. >30cc/24 hours  Steri strips intact,    Right Breast- Clean, dry, and intact, no drainage. Steri strips intact, no signs of infection. BORA drain intact without signs of infection, output is appropriate color and volume. >30cc/24 hours  Steri strips intact,    Assessment:    Patient Active Problem List   Diagnosis    Acquired hypothyroidism    Malignant neoplasm of upper-outer quadrant of right breast in female, estrogen receptor negative ([facility] [address]    Pre-operative laboratory examination       Plan:     Status post first stage breast reconstruction    -Continue wearing surgical bra at all times  -Continue to record BORA drain output every 8 hours  -Educated the pt that her pain is proportionate to the amount of surgery she had and she may begin using Ibuprofen in addition to pain medication. -ABX to completion  -Pain control PRN  -No driving while taking narcotic pain medication or while impaired second to limited UE ROM  -No heavy lifting at this time  -OK to shower at this time. Advised the patient that they can allow soap and water to rinse of the incision site while showering. Once they are done in the shower they are to pat dry the incision site with a clean paper towel. No baths, hot tubs or soaking of the wound site at this time. Pt voices understanding. Left Expander- 650ml  Right Expander- 650ml    Left Prefill- 460ml  Right Prefill- 460ml  Area cleansed with alcohol after locating port magnetic port finder. 23 gauge needle introduced until metal backing was felt. 60cc to right breast, and 60cc to left breast filled. A bandaid was placed. Patient tolerated well. Left Postfill- 520ml  Right Postfill- 520ml    F/U in 1 week. Call office with concerns or signs of infection.     Gisella Westfall MD

## 2021-11-17 NOTE — PROGRESS NOTES
Subjective: Follow up today from left prophylactic mastectomy to my bilateral for stage breast reconstruction using tissue expanders and AlloDerm. Denies fever, nausea, vomiting, leg pain or swelling, pain is mild. The pt states she is finishd taking her antibiotic and continues to wear her surgical bra. She voices no complaints with BORA drain management. She is ambulating at home. She is  wearing her surgical bra at all times. She is recording her BORA drain output and does present today with a list of the output volume. Objective:    Breastfeeding No       Left Breast- Clean, dry, and intact, no drainage. no signs of infection. BORA drain intact without signs of infection, output is appropriate color and volume. <30cc/24 hours  Steri strips intact,    Right Breast- Clean, dry, and intact, no drainage. Steri strips intact, no signs of infection. BORA drain intact without signs of infection, output is appropriate color and volume. <30cc/24 hours  Steri strips intact,    Assessment:    Patient Active Problem List   Diagnosis    Acquired hypothyroidism    Malignant neoplasm of upper-outer quadrant of right breast in female, estrogen receptor negative ([facility] [address]    Pre-operative laboratory examination       Plan:     Status post first stage breast reconstruction    -Continue wearing surgical bra at all times  BORA removed  -No driving while taking narcotic pain medication or while impaired second to limited UE ROM    May introduce unrestricted activity. Left Expander- 650ml  Right Expander- 650ml    Left Prefill- 520ml  Right Prefill- 520ml  Area cleansed with alcohol after locating port magnetic port finder. 23 gauge needle introduced until metal backing was felt. 60cc to right breast, and 60cc to left breast filled. A bandaid was placed. Patient tolerated well. Left Postfill- 580ml  Right Postfill- 580ml    F/U in 2 week. Call office with concerns or signs of infection.         This document is generated, in part, by voice recognition software and thus  syntax and grammatical errors are possible.     Facundo Jensen MD  9:56 AM  11/17/2021

## 2021-11-17 NOTE — PROGRESS NOTES
Tissue Expander(s):  López 0.9% Sodium Chloride Injection USP   lot#  [id number]                Exp:   6/2022         [facility]:[id number]

## 2021-11-29 ENCOUNTER — OFFICE VISIT (OUTPATIENT)
Dept: SURGERY | Age: 56
End: 2021-11-29

## 2021-11-29 VITALS — RESPIRATION RATE: 20 BRPM | TEMPERATURE: 97.2 F | HEART RATE: 86 BPM | OXYGEN SATURATION: 99 %

## 2021-11-29 DIAGNOSIS — Z98.890 S/P BREAST RECONSTRUCTION: ICD-10-CM

## 2021-11-29 DIAGNOSIS — Z98.890 S/P BREAST RECONSTRUCTION: Primary | ICD-10-CM

## 2021-11-29 PROCEDURE — 99024 POSTOP FOLLOW-UP VISIT: CPT | Performed by: PHYSICIAN ASSISTANT

## 2021-11-29 RX ORDER — SULFAMETHOXAZOLE AND TRIMETHOPRIM 800; 160 MG/1; MG/1
1 TABLET ORAL 2 TIMES DAILY
Qty: 20 TABLET | Refills: 0 | Status: SHIPPED | OUTPATIENT
Start: 2021-11-29 | End: 2021-12-09

## 2021-11-29 NOTE — PROGRESS NOTES
0.9% Sodium Chloride   Lot [id number]  Exp Jun22  NDC [id number]
sensitivity. I will start her on prophylactic Bactrim at this time she understands with any erythema or signs of infection to contact our office immediately. F/U in 1 week. Call office with concerns or signs of infection. This document is generated, in part, by voice recognition software and thus  syntax and grammatical errors are possible.     Mathieu Romero  1:14 PM  11/29/2021

## 2021-12-02 LAB — WOUND/ABSCESS: NORMAL

## 2021-12-03 ENCOUNTER — OFFICE VISIT (OUTPATIENT)
Dept: BREAST CENTER | Age: 56
End: 2021-12-03

## 2021-12-03 VITALS
HEART RATE: 86 BPM | SYSTOLIC BLOOD PRESSURE: 139 MMHG | WEIGHT: 152 LBS | BODY MASS INDEX: 26.93 KG/M2 | RESPIRATION RATE: 16 BRPM | OXYGEN SATURATION: 100 % | HEIGHT: 63 IN | TEMPERATURE: 97.6 F | DIASTOLIC BLOOD PRESSURE: 63 MMHG

## 2021-12-03 DIAGNOSIS — Z17.1 MALIGNANT NEOPLASM OF UPPER-OUTER QUADRANT OF RIGHT BREAST IN FEMALE, ESTROGEN RECEPTOR NEGATIVE (HCC): Primary | ICD-10-CM

## 2021-12-03 DIAGNOSIS — C50.411 MALIGNANT NEOPLASM OF UPPER-OUTER QUADRANT OF RIGHT BREAST IN FEMALE, ESTROGEN RECEPTOR NEGATIVE (HCC): Primary | ICD-10-CM

## 2021-12-03 PROCEDURE — 99024 POSTOP FOLLOW-UP VISIT: CPT | Performed by: SURGERY

## 2021-12-03 NOTE — PATIENT INSTRUCTIONS
Any questions or concerns, please contact my medical assistant Dorothea at [phone]. Let  office know to contact  office when scheduling surgery so can take port out the same time.

## 2021-12-03 NOTE — PROGRESS NOTES
[facility]  PROGRESS NOTE  ATTENDING NOTE    POSTOP APPOINTMENT    Chief Complaint   Patient presents with    Post-Op Check     P/O Right Breast Mastectomy, DOS 10/28/21, Reconstruction with Dr. Arielle Angel. Pt states that she is doing well since surgery. S:  64 y. o.female s/p right breast modified radical mastectomy with first stage reconstruction and left prophylactic mastectomy. She is followed up multiple times with Dr. Arielle Angel and has her expanders filled. She is having some fluid around the right breast and expander. It was drained last time she was in Dr. Schaffer Pain office and she is currently on antibiotics. There is no signs of infections and she denies fevers or chills. She has not been getting her port flushed. We discussed when she would have it removed and she like to have it removed when she goes for her second stage recon. /63 (Site: Right Upper Arm, Position: Sitting, Cuff Size: Large Adult)   Pulse 86   Temp 97.6 °F (36.4 °C) (Infrared)   Resp 16   Ht 5' 3\" (1.6 m)   Wt 152 lb (68.9 kg)   SpO2 100%   BMI 26.93 kg/m²   Physical Exam  Constitutional:       Appearance: Normal appearance. HENT:      Head: Normocephalic and atraumatic. Nose: Nose normal.      Mouth/Throat:      Mouth: Mucous membranes are moist.      Pharynx: Oropharynx is clear. Eyes:      Extraocular Movements: Extraocular movements intact. Pupils: Pupils are equal, round, and reactive to light. Cardiovascular:      Rate and Rhythm: Normal rate. Pulses: Normal pulses. Pulmonary:      Effort: Pulmonary effort is normal.   Chest:          Comments: Appears to have range of motion  and not complaining of any paresthesias in the right arm. Musculoskeletal:         General: No tenderness or signs of injury. Cervical back: Normal range of motion and neck supple. Skin:     General: Skin is warm and dry. Neurological:      General: No focal deficit present. Mental Status: She is alert and oriented to person, place, and time. Psychiatric:         Mood and Affect: Mood normal.         Behavior: Behavior normal.         Thought Content: Thought content normal.         Judgment: Judgment normal.           PATHOLOGY:      Diagnosis:   A.  Left breast, mastectomy: Involutional changes of breast, unremarkable   skin and nipple, no evidence of neoplasm. B.  Right breast, mastectomy: Ductal carcinoma in situ.  No evidence of   residual invasive carcinoma.  Unremarkable nipple and skin.  See comment. C.  [place] lymph node #1: Single lymph node, no evidence of neoplasm. D.  Right axillary contents, regional resection: Single lymph node, no   evidence of neoplasm. Comment:     In the right breast, several duct cross-sections reveal   ductal carcinoma in situ, 10 mm from the nearest margin (inferior); there   is no evidence of residual invasive carcinoma following presurgical   therapy. GENETIC TESTING:  VUS--CDKN2A    LAST COLONOSCOPY:  none      ASSESSMENT/PLAN:  right IDC/DCIS breast cancer, [id number]-, grade 2  1. Patient instructed to keep incision clean and dry well after bathing. Patient can start scar massage once incision is completely healed and strong enough to handle the motion (usually 10 - 14 days post operatively). Rub in a circular motion on and around the scar with firm, even pressure for 5 minutes four times per day. Use lotion or moisturizer to do the scar massage to allow ease with motion over the scar and prevent friction at the area. 2. Continue monthly breast self examination. Bring any changes to your physician's attention. 3. Range of motion exercises for bilateral shoulder encouraged. Lymphedema precautions discussed. 4. no IV, venipuncture, BPs on right side  5. Education/Lifestyle recommendations: A regular exercise program is encouraged. Avoid excessive caffeine intake.  Maintain a diet rich in vegetables and fruits avoiding processed and fast food. 6. Consultations: Oncology appointment will be scheduled with Oncologist of patient's and/or PCP's preference. F/U WITH DR. Veronica Molina    7. --DEXA scan: No  8.  Continue regular appointments with PCP & Gynecologist.  9. Office follow-up visit should be scheduled in 6  months and PRN.    --RTC in 6 months    Paulina Gonzalez MD, MSc, FACS  12/3/2021  12:54 PM

## 2021-12-06 NOTE — PROGRESS NOTES
Subjective: Follow up today from left prophylactic mastectomy to my bilateral for stage breast reconstruction using tissue expanders and AlloDerm. Denies fever, nausea, vomiting, leg pain or swelling, pain is absent. She voices no complaints with continued bilateral breast tissue expansion. Objective: There were no vitals taken for this visit. Left Breast- Clean, dry, and intact, no drainage. no signs of infection. Right Breast- Clean, dry, and intact, no drainage. no signs of infection. There Is a fluid wave on palpation         Assessment:    Patient Active Problem List   Diagnosis    Acquired hypothyroidism    Malignant neoplasm of upper-outer quadrant of right breast in female, estrogen receptor negative ([facility] [address]       Plan:     Status post first stage breast reconstruction    -Continue wearing surgical bra at all times padding to the right lower breast pole. -No driving while taking narcotic pain medication or while impaired second to limited UE ROM    May introduce unrestricted activity. Left Expander- 650ml  Right Expander- 650ml    Left Prefill- 640ml  Right Prefill- 640ml  Area cleansed with alcohol after locating port magnetic port finder. 23 gauge needle introduced until metal backing was felt. 30cc to right breast, and 30cc to left breast filled. A bandaid was placed. Patient tolerated well. Left Postfill- 697ml  Right Postfill- 670ml      As the patient states she has no sensation to the right lower breast pole lidocaine was omitted from the procedure. The procedure was carried out 50cc of serous fluid to the right breast was expunged. Careful consideration was maintained to keep the integrity of the tissue expander. After the procedure was completed area was palpated no pain to palpation fluid collection decreased. A bandaid was placed. Patient tolerated well. Integrity of the tissue expander on palpation, intact. F/U in 1 week for virtual visit.   2 weeks to be seen in the office. Call office with concerns or signs of infection.       TISH Carvalho

## 2021-12-07 ENCOUNTER — OFFICE VISIT (OUTPATIENT)
Dept: SURGERY | Age: 56
End: 2021-12-07

## 2021-12-07 VITALS — TEMPERATURE: 97.3 F | OXYGEN SATURATION: 98 % | HEART RATE: 83 BPM

## 2021-12-07 DIAGNOSIS — Z98.890 S/P BREAST RECONSTRUCTION: Primary | ICD-10-CM

## 2021-12-07 PROCEDURE — 99024 POSTOP FOLLOW-UP VISIT: CPT | Performed by: PHYSICIAN ASSISTANT

## 2021-12-13 ENCOUNTER — OFFICE VISIT (OUTPATIENT)
Dept: SURGERY | Age: 56
End: 2021-12-13

## 2021-12-13 VITALS — TEMPERATURE: 97.2 F

## 2021-12-13 DIAGNOSIS — Z98.890 S/P BREAST RECONSTRUCTION: Primary | ICD-10-CM

## 2021-12-13 PROCEDURE — 99024 POSTOP FOLLOW-UP VISIT: CPT | Performed by: PHYSICIAN ASSISTANT

## 2021-12-13 RX ORDER — CEPHALEXIN 500 MG/1
500 CAPSULE ORAL 4 TIMES DAILY
Qty: 28 CAPSULE | Refills: 0 | Status: SHIPPED | OUTPATIENT
Start: 2021-12-13 | End: 2021-12-20

## 2021-12-13 NOTE — PROGRESS NOTES
Subjective: Follow up today from left prophylactic mastectomy to my bilateral for stage breast reconstruction using tissue expanders and AlloDerm. Denies fever, nausea, vomiting, leg pain or swelling, pain is absent. She voices no complaints with continued bilateral breast tissue expansion. Objective:    Temp 97.2 °F (36.2 °C) (Temporal)   Breastfeeding No       Left Breast- Clean, dry, and intact, no drainage. no signs of infection. Right Breast- Clean, dry, and intact, no drainage. no signs of infection. There Is a fluid wave on palpation         Assessment:    Patient Active Problem List   Diagnosis    Acquired hypothyroidism    Malignant neoplasm of upper-outer quadrant of right breast in female, estrogen receptor negative ([facility] [address]       Plan:     Status post first stage breast reconstruction    -Continue wearing surgical bra at all times padding to the right lower breast pole. -No driving while taking narcotic pain medication or while impaired second to limited UE ROM    May introduce unrestricted activity. Left Expander- 650ml  Right Expander- 650ml    Left Postfill- 670ml  Right Postfill- 670ml      As the patient states she has no sensation to the right lower breast pole lidocaine was omitted from the procedure. The procedure was carried out 40cc of serous fluid to the right breast was expunged. Careful consideration was maintained to keep the integrity of the tissue expander. After the procedure was completed area was palpated no pain to palpation fluid collection decreased. A bandaid was placed. Patient tolerated well. Integrity of the tissue expander on palpation, intact. Additional prophylactic antibiotics were to the pharmacy today second to the high risk nature of breast reconstruction the patient's continued seroma accumulation to the right lower breast pole. F/U in 1 week    Call office with concerns or signs of infection.       TISH Cummings

## 2021-12-20 ENCOUNTER — OFFICE VISIT (OUTPATIENT)
Dept: SURGERY | Age: 56
End: 2021-12-20

## 2021-12-20 VITALS — HEIGHT: 63 IN | BODY MASS INDEX: 26.58 KG/M2 | TEMPERATURE: 97.9 F | WEIGHT: 150 LBS

## 2021-12-20 DIAGNOSIS — Z98.890 S/P BREAST RECONSTRUCTION: Primary | ICD-10-CM

## 2021-12-20 PROCEDURE — 99024 POSTOP FOLLOW-UP VISIT: CPT | Performed by: PHYSICIAN ASSISTANT

## 2021-12-20 RX ORDER — CEPHALEXIN 500 MG/1
500 CAPSULE ORAL 4 TIMES DAILY
Qty: 20 CAPSULE | Refills: 0 | Status: SHIPPED | OUTPATIENT
Start: 2021-12-20 | End: 2021-12-25

## 2021-12-20 NOTE — PROGRESS NOTES
Subjective: Follow up today from left prophylactic mastectomy to my bilateral for stage breast reconstruction using tissue expanders and AlloDerm. Denies fever, nausea, vomiting, leg pain or swelling, pain is absent. She voices no complaints with continued bilateral breast tissue expansion. Objective:    Temp 97.9 °F (36.6 °C) (Temporal)   Ht 5' 3\" (1.6 m)   Wt 150 lb (68 kg)   Breastfeeding No   BMI 26.57 kg/m²       Left Breast- Clean, dry, and intact, no drainage. no signs of infection. Right Breast- Clean, dry, and intact, no drainage. no signs of infection. There Is a fluid wave on palpation         Assessment:    Patient Active Problem List   Diagnosis    Acquired hypothyroidism    Malignant neoplasm of upper-outer quadrant of right breast in female, estrogen receptor negative ([facility] [address]       Plan:     Status post first stage breast reconstruction    -Continue wearing surgical bra at all times padding to the right lower breast pole. -No driving while taking narcotic pain medication or while impaired second to limited UE ROM    May introduce unrestricted activity. Left Expander- 650ml  Right Expander- 650ml    Left Postfill- 670ml  Right Postfill- 670ml      As the patient states she has no sensation to the right lower breast pole lidocaine was omitted from the procedure. The procedure was carried out 45cc of serous fluid to the right breast was expunged. Careful consideration was maintained to keep the integrity of the tissue expander. After the procedure was completed area was palpated no pain to palpation fluid collection decreased. A bandaid was placed. Patient tolerated well. Integrity of the tissue expander on palpation, intact. Additional prophylactic antibiotics were to the pharmacy today second to the high risk nature of breast reconstruction the patient's continued seroma accumulation to the right lower breast pole.     F/U in 1 week    Call office with concerns or signs of infection.       TISH Bryan

## 2021-12-27 ENCOUNTER — OFFICE VISIT (OUTPATIENT)
Dept: SURGERY | Age: 56
End: 2021-12-27

## 2021-12-27 VITALS — TEMPERATURE: 97 F | HEART RATE: 94 BPM | OXYGEN SATURATION: 96 %

## 2021-12-27 DIAGNOSIS — Z98.890 S/P BREAST RECONSTRUCTION: Primary | ICD-10-CM

## 2021-12-27 PROCEDURE — 99024 POSTOP FOLLOW-UP VISIT: CPT | Performed by: PHYSICIAN ASSISTANT

## 2021-12-27 NOTE — PROGRESS NOTES
Subjective: Follow up today from left prophylactic mastectomy to my bilateral for stage breast reconstruction using tissue expanders and AlloDerm. Denies fever, nausea, vomiting, leg pain or swelling, pain is absent. She voices no complaints with continued bilateral breast tissue expansion. Objective:    Pulse 94   Temp 97 °F (36.1 °C) (Infrared)   SpO2 96%       Left Breast- Clean, dry, and intact, no drainage. no signs of infection. Right Breast- Clean, dry, and intact, no drainage. no signs of infection. There Is a fluid wave on palpation         Assessment:    Patient Active Problem List   Diagnosis    Acquired hypothyroidism    Malignant neoplasm of upper-outer quadrant of right breast in female, estrogen receptor negative ([facility] [address]       Plan:     Status post first stage breast reconstruction    -Continue wearing surgical bra at all times padding to the right lower breast pole. -No driving while taking narcotic pain medication or while impaired second to limited UE ROM    May introduce unrestricted activity. Left Expander- 650ml  Right Expander- 650ml    Left Postfill- 670ml  Right Postfill- 670ml      As the patient states she has no sensation to the right lower breast pole lidocaine was omitted from the procedure. The procedure was carried out 30cc of serous fluid to the right breast was expunged. Careful consideration was maintained to keep the integrity of the tissue expander. After the procedure was completed area was palpated no pain to palpation fluid collection decreased. A bandaid was placed. Patient tolerated well. Integrity of the tissue expander on palpation, intact. I discussed with the patient today second stage breast reconstruction. I informed the patient we will plan to see her back in 10 days for possible further fluid evacuation of the right breast seroma. I did discuss with her again second stage breast reconstruction.   She would like to proceed as

## 2022-01-07 ENCOUNTER — OFFICE VISIT (OUTPATIENT)
Dept: SURGERY | Age: 57
End: 2022-01-07

## 2022-01-07 VITALS — OXYGEN SATURATION: 99 % | TEMPERATURE: 97.2 F | HEART RATE: 105 BPM

## 2022-01-07 DIAGNOSIS — Z98.890 S/P BREAST RECONSTRUCTION: Primary | ICD-10-CM

## 2022-01-07 PROCEDURE — 99024 POSTOP FOLLOW-UP VISIT: CPT | Performed by: PLASTIC SURGERY

## 2022-01-10 NOTE — PROGRESS NOTES
Patient presents with left leg burning since a return flight from [place] on 11/9.  She states that the pain began mid-flight and has been off and on since then.  She came in today because her son expressed concern for a possible blood clot.  She does not have a history of any clots in the past.    Subjective: Follow up today from left prophylactic mastectomy to my bilateral for stage breast reconstruction using tissue expanders and AlloDerm. Denies fever, nausea, vomiting, leg pain or swelling, pain is absent. She voices no complaints with continued bilateral breast tissue expansion. The patient is ready to proceed with second stage breast reconstruction with removal of tissue expanders and placement of silicone breast implants    Objective:    Pulse 105   Temp 97.2 °F (36.2 °C) (Infrared)   SpO2 99%       Left Breast- Clean, dry, and intact, no drainage. no signs of infection. Right Breast- Clean, dry, and intact, no drainage. no signs of infection. There Is no notable fluid wave on palpation         Assessment:    Patient Active Problem List   Diagnosis    Acquired hypothyroidism    Malignant neoplasm of upper-outer quadrant of right breast in female, estrogen receptor negative ([facility] [address]       Plan:     Status post first stage breast reconstruction    -Continue wearing surgical bra at all times padding to the right lower breast pole. -No driving while taking narcotic pain medication or while impaired second to limited UE ROM    May introduce unrestricted activity. Left Expander- 650ml  Right Expander- 650ml    Left Postfill- 670ml  Right Postfill- 670ml      Saline-filled Breast Implants:   Benefits: Less threatening to your health should one rupture. If an implant does rupture, the patient will know immediately. Risks: Should an implant rupture, the woman will have one or both breasts that appear deflated and will need immediate attention to restore a normal appearance. Greater chance of rippling or wrinkling. This is often considered the greatest downfall of saline implants. Often described as feeling like a water balloon. Silicone Breast Implants:   Benefits: Many Women believe they are more natural feeling implants. Generally smoother and softer to the touch.  Less likely to wrinkle or ripple than saline implants. If a Silicone implant ruptures the woman will not see a physical difference for some time if at all. Downfall: MRIs needed every 2 years to detect ruptured implants. The patient was educated on the risks involving (Breast Implant Associated-Anaplastic Large Cell Lymphoma (JJ-ALCL)). JJ-ALCL is not a breast cancer, but a rare and treatable T-cell lymphoma that usually develops as a fluid swelling around breast implants. The lifetime risk for this disease appears to be about 1 case for every 30,000 textured implants. Thus far, there have been no confirmed cases of JJ-ALCL in women who have had only smooth-surface breast implants. The FDA is not recommending removal of textured implants. Rather, the FDA recommends, as do I, that every woman conduct regular self-examination. The patient was educated that if she does develop JJ-ALCL she may require additional treatment such as radiation or chemotherapy along with removal of the breast implant and surrounding scar tissue. She chooses silicone breast implants    Surgical plan: Second stage breast reconstruction with exchange of bilateral tissue expanders for permanent silicone breast implants. The patient does not have significant excess lateral chest wall soft tissues, therefore extension of incisions would not be necessary. The risks, benefits and options were discussed with the pt. The risks included but not limited to pain, bleeding, infection, heavy scarring, damage to surrounding structures,  and need for further procedures. Other risks including but not limited to asymmetry, loss of nipple or/and areola, loss of sensation to nipple and areola, inability to breast feed, seroma, hematoma, implant failure, capsular contracture, and fat necrosis were also discussed with the patient. All of her questions were answered to her satisfaction and she agrees to proceed with the operation.       This document is generated, in part, by voice recognition software and thus  syntax and grammatical errors are possible.     Beronica Howe MD  11:24 AM  1/10/2022

## 2022-01-11 ENCOUNTER — TELEPHONE (OUTPATIENT)
Dept: SURGERY | Age: 57
End: 2022-01-11

## 2022-01-11 NOTE — TELEPHONE ENCOUNTER
Patient called stating she'd talked to her old insurance co, job and family services and [employer]. The problem should be fixed and I was told to resubmit prior auth request.    Re-faxed prior auth request today.

## 2022-01-11 NOTE — TELEPHONE ENCOUNTER
Rosa Maria Saravia from [place] called stated Patt Johnson is secondary, Palomo Lawson is primary regarding request for prior authorization for surgery.

## 2022-01-11 NOTE — TELEPHONE ENCOUNTER
Called patient and discussed this with her. Her insurance through her previous employer has been terminated and should no longer be effective. She will call her employer and [employer] and get this figured out and let us know.

## 2022-01-14 ENCOUNTER — OFFICE VISIT (OUTPATIENT)
Dept: FAMILY MEDICINE CLINIC | Age: 57
End: 2022-01-14
Payer: COMMERCIAL

## 2022-01-14 VITALS
DIASTOLIC BLOOD PRESSURE: 70 MMHG | HEIGHT: 63 IN | WEIGHT: 148 LBS | OXYGEN SATURATION: 100 % | BODY MASS INDEX: 26.22 KG/M2 | HEART RATE: 95 BPM | TEMPERATURE: 97.8 F | SYSTOLIC BLOOD PRESSURE: 120 MMHG

## 2022-01-14 DIAGNOSIS — E03.9 ACQUIRED HYPOTHYROIDISM: ICD-10-CM

## 2022-01-14 DIAGNOSIS — R73.01 IMPAIRED FASTING GLUCOSE: ICD-10-CM

## 2022-01-14 DIAGNOSIS — Z17.1 MALIGNANT NEOPLASM OF UPPER-OUTER QUADRANT OF RIGHT BREAST IN FEMALE, ESTROGEN RECEPTOR NEGATIVE (HCC): ICD-10-CM

## 2022-01-14 DIAGNOSIS — C50.411 MALIGNANT NEOPLASM OF UPPER-OUTER QUADRANT OF RIGHT BREAST IN FEMALE, ESTROGEN RECEPTOR NEGATIVE (HCC): ICD-10-CM

## 2022-01-14 DIAGNOSIS — Z00.01 ENCOUNTER FOR WELL ADULT EXAM WITH ABNORMAL FINDINGS: Primary | ICD-10-CM

## 2022-01-14 DIAGNOSIS — J31.0 CHRONIC RHINITIS: ICD-10-CM

## 2022-01-14 DIAGNOSIS — E55.9 VITAMIN D INSUFFICIENCY: ICD-10-CM

## 2022-01-14 DIAGNOSIS — M25.552 LEFT HIP PAIN: ICD-10-CM

## 2022-01-14 DIAGNOSIS — F32.1 CURRENT MODERATE EPISODE OF MAJOR DEPRESSIVE DISORDER WITHOUT PRIOR EPISODE (HCC): ICD-10-CM

## 2022-01-14 DIAGNOSIS — Z00.01 ENCOUNTER FOR WELL ADULT EXAM WITH ABNORMAL FINDINGS: ICD-10-CM

## 2022-01-14 LAB
ALBUMIN SERPL-MCNC: 4.7 G/DL (ref 3.5–5.2)
ALP BLD-CCNC: 76 U/L (ref 35–104)
ALT SERPL-CCNC: 9 U/L (ref 0–32)
ANION GAP SERPL CALCULATED.3IONS-SCNC: 12 MMOL/L (ref 7–16)
AST SERPL-CCNC: 14 U/L (ref 0–31)
BASOPHILS ABSOLUTE: 0.07 E9/L (ref 0–0.2)
BASOPHILS RELATIVE PERCENT: 1 % (ref 0–2)
BILIRUB SERPL-MCNC: 0.3 MG/DL (ref 0–1.2)
BUN BLDV-MCNC: 13 MG/DL (ref 6–20)
CALCIUM SERPL-MCNC: 9.9 MG/DL (ref 8.6–10.2)
CHLORIDE BLD-SCNC: 106 MMOL/L (ref 98–107)
CHOLESTEROL, TOTAL: 195 MG/DL (ref 0–199)
CO2: 22 MMOL/L (ref 22–29)
CREAT SERPL-MCNC: 0.8 MG/DL (ref 0.5–1)
EOSINOPHILS ABSOLUTE: 0.3 E9/L (ref 0.05–0.5)
EOSINOPHILS RELATIVE PERCENT: 4.3 % (ref 0–6)
FOLATE: 18.2 NG/ML (ref 4.8–24.2)
GFR AFRICAN AMERICAN: >60
GFR NON-AFRICAN AMERICAN: >60 ML/MIN/1.73
GLUCOSE BLD-MCNC: 86 MG/DL (ref 74–99)
HBA1C MFR BLD: 5.2 % (ref 4–5.6)
HCT VFR BLD CALC: 40.1 % (ref 34–48)
HDLC SERPL-MCNC: 61 MG/DL
HEMOGLOBIN: 12.5 G/DL (ref 11.5–15.5)
IMMATURE GRANULOCYTES #: 0.01 E9/L
IMMATURE GRANULOCYTES %: 0.1 % (ref 0–5)
LDL CHOLESTEROL CALCULATED: 123 MG/DL (ref 0–99)
LYMPHOCYTES ABSOLUTE: 1.62 E9/L (ref 1.5–4)
LYMPHOCYTES RELATIVE PERCENT: 23 % (ref 20–42)
MCH RBC QN AUTO: 27.5 PG (ref 26–35)
MCHC RBC AUTO-ENTMCNC: 31.2 % (ref 32–34.5)
MCV RBC AUTO: 88.1 FL (ref 80–99.9)
MONOCYTES ABSOLUTE: 0.4 E9/L (ref 0.1–0.95)
MONOCYTES RELATIVE PERCENT: 5.7 % (ref 2–12)
NEUTROPHILS ABSOLUTE: 4.64 E9/L (ref 1.8–7.3)
NEUTROPHILS RELATIVE PERCENT: 65.9 % (ref 43–80)
PDW BLD-RTO: 14.6 FL (ref 11.5–15)
PLATELET # BLD: 319 E9/L (ref 130–450)
PMV BLD AUTO: 9.4 FL (ref 7–12)
POTASSIUM SERPL-SCNC: 4.3 MMOL/L (ref 3.5–5)
RBC # BLD: 4.55 E12/L (ref 3.5–5.5)
SODIUM BLD-SCNC: 140 MMOL/L (ref 132–146)
TOTAL PROTEIN: 7.3 G/DL (ref 6.4–8.3)
TRIGL SERPL-MCNC: 56 MG/DL (ref 0–149)
TSH SERPL DL<=0.05 MIU/L-ACNC: 1.86 UIU/ML (ref 0.27–4.2)
VITAMIN B-12: >2000 PG/ML (ref 211–946)
VITAMIN D 25-HYDROXY: 27 NG/ML (ref 30–100)
VLDLC SERPL CALC-MCNC: 11 MG/DL
WBC # BLD: 7 E9/L (ref 4.5–11.5)

## 2022-01-14 PROCEDURE — 99213 OFFICE O/P EST LOW 20 MIN: CPT | Performed by: FAMILY MEDICINE

## 2022-01-14 PROCEDURE — G8484 FLU IMMUNIZE NO ADMIN: HCPCS | Performed by: FAMILY MEDICINE

## 2022-01-14 PROCEDURE — 99396 PREV VISIT EST AGE 40-64: CPT | Performed by: FAMILY MEDICINE

## 2022-01-14 RX ORDER — LEVOTHYROXINE SODIUM 0.05 MG/1
50 TABLET ORAL DAILY
Qty: 90 TABLET | Refills: 3 | Status: SHIPPED
Start: 2022-01-14 | End: 2022-07-05 | Stop reason: SDUPTHER

## 2022-01-14 RX ORDER — AZELASTINE 1 MG/ML
1 SPRAY, METERED NASAL 2 TIMES DAILY
Qty: 60 ML | Refills: 1 | Status: SHIPPED
Start: 2022-01-14 | End: 2022-02-11 | Stop reason: SDUPTHER

## 2022-01-14 SDOH — ECONOMIC STABILITY: FOOD INSECURITY: WITHIN THE PAST 12 MONTHS, YOU WORRIED THAT YOUR FOOD WOULD RUN OUT BEFORE YOU GOT MONEY TO BUY MORE.: NEVER TRUE

## 2022-01-14 SDOH — ECONOMIC STABILITY: FOOD INSECURITY: WITHIN THE PAST 12 MONTHS, THE FOOD YOU BOUGHT JUST DIDN'T LAST AND YOU DIDN'T HAVE MONEY TO GET MORE.: NEVER TRUE

## 2022-01-14 ASSESSMENT — ENCOUNTER SYMPTOMS
ABDOMINAL PAIN: 0
NAUSEA: 0
COUGH: 0
VOMITING: 0
CONSTIPATION: 0
WHEEZING: 0
BACK PAIN: 0
DIARRHEA: 0
SHORTNESS OF BREATH: 0

## 2022-01-14 ASSESSMENT — SOCIAL DETERMINANTS OF HEALTH (SDOH): HOW HARD IS IT FOR YOU TO PAY FOR THE VERY BASICS LIKE FOOD, HOUSING, MEDICAL CARE, AND HEATING?: NOT HARD AT ALL
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
## 2022-01-15 LAB — T4 FREE: 1.92 NG/DL (ref 0.93–1.7)

## 2022-01-18 ENCOUNTER — TELEPHONE (OUTPATIENT)
Dept: SURGERY | Age: 57
End: 2022-01-18

## 2022-01-18 NOTE — TELEPHONE ENCOUNTER
Patient called office stated PAT wanted her to call office and ask Tati Vargas when she has surgery on 1/25/22 would it be allowed for Araseli Gramajo of her [employer] to come to hospital to push patients  in his wheelchair. Patient stated Araseli Gramajo was allowed previously.

## 2022-01-18 NOTE — PROGRESS NOTES
Christina [facility] PRE-ADMISSION TESTING GENERAL INSTRUCTIONS  [facility] Phone Number: [phone]    The Preadmission Testing patient is instructed accordingly using the following criteria (Check Applicable):    GENERAL INSTRUCTIONS:    [x] Antibacterial Soap Shower Night before and/or AM of Surgery  [x] Do not wear makeup, lotions, powders, deodorant. [x] Nothing by mouth after midnight, including gum, candy, mints, or water. [x] You may brush your teeth, gargle, but do NOT swallow water.   [] Jerzy (CHG) wipe instruction sheet and wipes given. [] Hibiclens shower the night before and the morning of surgery. Do not use Hibiclens on your face or head. [x] No tobacco products, illegal drugs, or alcohol within 24 hours of your surgery. [x] Jewelry, valuables or body piercing's should not be brought to the hospital. All body and/or tongue piercing's must be removed prior to arriving to hospital. ALL hair pins must be removed. [] Arrange transportation with a responsible adult  to and from the hospital. Arrange for someone to be with you for the remainder of the day and for 24 hours after your procedure due to having had anesthesia. Who will be your  for transportation?  - Ramon         Who will be staying with you for 24 hrs after your procedure? - Lavonne Backers  [x] [employer] card and photo ID. [x] Bring copy of living will or healthcare power of  papers to be placed in your electronic record. PARKING INSTRUCTIONS:     [x] Arrival Time: 1/25/22 @ [address] [facility]. · [x] Parking lot '\"I\"  is located on [facility] (the corner of [facility]). To enter, press the button and the gate will lift. A free token will be provided to exit the lot. EDUCATION INSTRUCTIONS:        [x] Incentive Spirometry,coughing & deep breathing exercises reviewed.      [x] Medication information sheet(s)   [x] Pain: Post-op pain is normal and to be expected. You will be asked to rate your pain from 0-10. [x] Ask your nurse for your pain medication. MEDICATION INSTRUCTIONS:    [x] Bring a complete list of your medications, please write the last time you took the medicine, give this list to the nurse. [x] Take the following medications the morning of surgery with 1-2 ounces of water: No medications the AM of surgery. [x] Stop herbal supplements and vitamins 5 days before your surgery. LD 1/20  [x] Use your inhalers the morning of surgery. Bring your emergency inhaler with you day of surgery. [x] Follow physician instructions regarding any blood thinners you may be taking. WHAT TO EXPECT:    [x] The day of surgery you will be greeted and checked in by the [facility].  In addition, you will be registered in the [place] by a Patient Access Representative. Please bring your photo ID and insurance card. A nurse will greet you in accordance to the time you are needed in the pre-op area to prepare you for surgery. Please do not be discouraged if you are not greeted in the order you arrive as there are many variables that are involved in patient preparation. Your patience is greatly appreciated as you wait for your nurse. Please bring in items such as: books, magazines, newspapers, electronics, or any other items  to occupy your time in the waiting area. [x]  Delays may occur with surgery and staff will make a sincere effort to keep you informed of delays. If any delays occur with your procedure, we apologize ahead of time for your inconvenience as we recognize the value of your time.

## 2022-01-18 NOTE — TELEPHONE ENCOUNTER
Surgery has been scheduled at [address] on 1/25/22, Pre-Admission Testing will call you prior to surgery to inform you arrival time and any other additional directions,if they are unable to reach you,please call them two days prior at [phone]. If taking Fish Oil, Vitamins, two weeks prior to surgery stop taking. If taking NSAIDS (such as Aspirin, Ibuprofen) anticoagulants please consult with your prescribing physician to get further instructions on when to stop medication prior to surgery that is scheduled, patient understood. Pre-Auth #: [id number]  CPT Codes: 78049  ICD 10: C50.411, [id number]      Phone discussion was had with the patient today regarding upcoming surgery. I had a discussion with the patient that although the patient's insurance company has approved the procedure proposed, there is no guarantee of payment by [employer] on their final review following the procedure. This is also reflected in the letter received by this office and the patient from the insurance company. The patient has voiced to me complete understanding of this scenario, understands that they will be responsible for their individual deductable/coinsurance balance as an out-of-pocket expense and possibly the financial responsibility of the surgical procedure if the patient's insurance company elects not to pay for certain or all services. The patient elects to proceed with the proposed surgical plan.

## 2022-01-19 ENCOUNTER — PREP FOR PROCEDURE (OUTPATIENT)
Dept: SURGERY | Age: 57
End: 2022-01-19

## 2022-01-19 NOTE — H&P
Department of Plastic Surgery - Adult  Attending Consult Note      CHIEF COMPLAINT:   History of breast cancer, breast reconstruction    History Obtained From:  patient    HISTORY OF PRESENT ILLNESS:                The patient is a 64 y.o. female who presents for   Follow up today from left prophylactic mastectomy to my bilateral for stage breast reconstruction using tissue expanders and AlloDerm. Denies fever, nausea, vomiting, leg pain or swelling, pain is absent. She voices no complaints with continued bilateral breast tissue expansion.   The patient is ready to proceed with second stage breast reconstruction with removal of tissue expanders and placement of silicone breast implants    Past Medical History:    Past Medical History:   Diagnosis Date    Anxiety     Cancer ([facility] [address] 05/2021    breast     Depression     History of cancer chemotherapy 6/2021-8/30/2021    Hypothyroidism      Past Surgical History:    Past Surgical History:   Procedure Laterality Date    BREAST BIOPSY Left     BREAST RECONSTRUCTION Bilateral 10/28/2021    LEFT PROPHYLACTIC MASTECTOMY WITH BILTATER FIRST STAGE RECONSTRUCTION USING TISSUE EXPANDER AND ALLODERM performed by Christo Pace MD at [address] [facility] Right 10/28/2021    RIGHT BREAST MASTECTOMY WITH SENTINEL LYMPH NODE BIOPSY POSSIBLE AXILLARY LYMPH NODE DISSECTION - Vignesh Mckay performed by Hubert Huston MD at [address] [facility] Left     Left ovary gone due to endometriosis - has cervix and right ovary    PORT SURGERY Left 6/21/2021    MEDI PORT INSERTION LEFT SIDE PLACEMENT performed by Hubert Huston MD at [address] [place] [address]. Right 5/6/2021    US BREAST NEEDLE BIOPSY RIGHT 5/6/2021 SHEREE GUADARRAMA [facility]     Current Medications:   Current Outpatient Medications   Medication Sig Dispense Refill    levothyroxine (SYNTHROID) 50 MCG tablet Take 1 tablet by mouth Daily 90 tablet 3    azelastine (ASTELIN) 0.1 % nasal spray 1 spray by Nasal route 2 times daily Use in each nostril as directed 60 mL 1    Ascorbic Acid (VITAMIN C) 250 MG tablet Take 500 mg by mouth daily      loratadine (CLARITIN) 10 MG tablet Take 10 mg by mouth daily      APPLE CIDER VINEGAR PO Take by mouth      FIBER PO Take by mouth      azelastine (ASTELIN) 0.1 % nasal spray 1 spray by Nasal route 2 times daily Use in each nostril as directed 2 Bottle 1    vitamin B-12 (CYANOCOBALAMIN) 100 MCG tablet Take 50 mcg by mouth daily       No current facility-administered medications for this visit. Allergies:  Oxycodone-acetaminophen    Social History:   Social History     Socioeconomic History    Marital status:      Spouse name: Not on file    Number of children: Not on file    Years of education: Not on file    Highest education level: Not on file   Occupational History    Not on file   Tobacco Use    Smoking status: Former Smoker     Packs/day: 1.00     Years: 10.00     Pack years: 10.00     Types: Cigarettes     Quit date:      Years since quittin.0    Smokeless tobacco: Never Used   Vaping Use    Vaping Use: Never used   Substance and Sexual Activity    Alcohol use: Not Currently    Drug use: Never    Sexual activity: Not Currently   Other Topics Concern    Not on file   Social History Narrative    Not on file     Social Determinants of Health     Financial Resource Strain: Low Risk     Difficulty of Paying Living Expenses: Not hard at all   Food Insecurity: No Food Insecurity    Worried About [address] in the Last Year: Never true    [address]  in the Last Year: Never true   Transportation Needs:     Lack of Transportation (Medical): Not on file    Lack of Transportation (Non-Medical):  Not on file   Physical Activity:     Days of Exercise per Week: Not on file    Minutes of Exercise per Session: Not on file   Stress:     Feeling of Stress : Not on file   Social Connections:     Frequency of Communication with Friends and Family: Not on file    Frequency of Social Gatherings with Friends and Family: Not on file    Attends [employer] Services: Not on file    Active Member of Clubs or Organizations: Not on file    Attends Club or Organization Meetings: Not on file    Marital Status: Not on file   Intimate Partner Violence:     Fear of Current or Ex-Partner: Not on file    Emotionally Abused: Not on file    Physically Abused: Not on file    Sexually Abused: Not on file   Housing Stability:     Unable to Pay for Housing in the Last Year: Not on file    Number of [place] in the Last Year: Not on file    Unstable Housing in the Last Year: Not on file     Family History:   Family History   Problem Relation Age of Onset    No Known Problems Father     Cervical Cancer Sister     Uterine Cancer Sister         unsure    No Known Problems Brother     Cancer Paternal Aunt        REVIEW OF SYSTEMS:    CONSTITUTIONAL:  negative for  fevers, chills, sweats and fatigue  EYES: negative for dipolpia or acute vision loss. RESPIRATORY:  negative for  dry cough, cough with sputum, dyspnea, wheezing and chest pain  HENT:negative for pain, headache, difficulty swallowing or nose bleeds. CARDIOVASCULAR:  negative for  chest pain, dyspnea, palpitations, syncope  GASTROINTESTINAL:  negative for nausea, vomiting, change in bowel habits, diarrhea, and constipation   EXTREMITIES: negative for edema  MUSCULOSKELETAL: negative for muscle weakness  SKIN: negative for itching or rashes. HEME: negative for easy brusing or bleeding  BEHAVIOR/PSYCH:  negative for poor appetite, increased appetite, decreased sleep and poor concentration    Left Breast- Clean, dry, and intact, no drainage. no signs of infection. Right Breast- Clean, dry, and intact, no drainage. no signs of infection.   There Is no notable fluid wave on palpation           Assessment:         Patient Active Problem List   Diagnosis    Acquired hypothyroidism    self-examination. The patient was educated that if she does develop JJ-ALCL she may require additional treatment such as radiation or chemotherapy along with removal of the breast implant and surrounding scar tissue.     She chooses silicone breast implants     Surgical plan: Second stage breast reconstruction with exchange of bilateral tissue expanders for permanent silicone breast implants. The patient does not have significant excess lateral chest wall soft tissues, therefore extension of incisions would not be necessary.     The risks, benefits and options were discussed with the pt. The risks included but not limited to pain, bleeding, infection, heavy scarring, damage to surrounding structures,  and need for further procedures. Other risks including but not limited to asymmetry, loss of nipple or/and areola, loss of sensation to nipple and areola, inability to breast feed, seroma, hematoma, implant failure, capsular contracture, and fat necrosis were also discussed with the patient. All of her questions were answered to her satisfaction and she agrees to proceed with the operation.

## 2022-01-24 NOTE — TELEPHONE ENCOUNTER
1/21/22 spoke with patient should be ok to bring someone to push wheelchair, hospital may have restrictions

## 2022-01-25 ENCOUNTER — ANESTHESIA (OUTPATIENT)
Dept: OPERATING ROOM | Age: 57
End: 2022-01-25
Payer: COMMERCIAL

## 2022-01-25 ENCOUNTER — ANESTHESIA EVENT (OUTPATIENT)
Dept: OPERATING ROOM | Age: 57
End: 2022-01-25
Payer: COMMERCIAL

## 2022-01-25 ENCOUNTER — HOSPITAL ENCOUNTER (OUTPATIENT)
Age: 57
Setting detail: OUTPATIENT SURGERY
Discharge: HOME | End: 2022-01-25
Attending: PLASTIC SURGERY | Admitting: PLASTIC SURGERY
Payer: COMMERCIAL

## 2022-01-25 VITALS
TEMPERATURE: 97.8 F | BODY MASS INDEX: 26.4 KG/M2 | SYSTOLIC BLOOD PRESSURE: 120 MMHG | HEIGHT: 63 IN | DIASTOLIC BLOOD PRESSURE: 60 MMHG | HEART RATE: 98 BPM | OXYGEN SATURATION: 96 % | RESPIRATION RATE: 16 BRPM | WEIGHT: 149 LBS

## 2022-01-25 VITALS — DIASTOLIC BLOOD PRESSURE: 108 MMHG | OXYGEN SATURATION: 100 % | SYSTOLIC BLOOD PRESSURE: 135 MMHG

## 2022-01-25 DIAGNOSIS — G89.18 POST-OP PAIN: Primary | ICD-10-CM

## 2022-01-25 PROCEDURE — 6360000002 HC RX W HCPCS: Performed by: PLASTIC SURGERY

## 2022-01-25 PROCEDURE — 3600000005 HC SURGERY LEVEL 5 BASE: Performed by: PLASTIC SURGERY

## 2022-01-25 PROCEDURE — 2709999900 HC NON-CHARGEABLE SUPPLY: Performed by: PLASTIC SURGERY

## 2022-01-25 PROCEDURE — 6360000002 HC RX W HCPCS: Performed by: ANESTHESIOLOGIST ASSISTANT

## 2022-01-25 PROCEDURE — 88305 TISSUE EXAM BY PATHOLOGIST: CPT

## 2022-01-25 PROCEDURE — 2580000003 HC RX 258: Performed by: PHYSICIAN ASSISTANT

## 2022-01-25 PROCEDURE — 6360000002 HC RX W HCPCS: Performed by: PHYSICIAN ASSISTANT

## 2022-01-25 PROCEDURE — 3600000015 HC SURGERY LEVEL 5 ADDTL 15MIN: Performed by: PLASTIC SURGERY

## 2022-01-25 PROCEDURE — 2500000003 HC RX 250 WO HCPCS: Performed by: ANESTHESIOLOGIST ASSISTANT

## 2022-01-25 PROCEDURE — 6370000000 HC RX 637 (ALT 250 FOR IP): Performed by: ANESTHESIOLOGY

## 2022-01-25 PROCEDURE — 99999 PR OFFICE/OUTPT VISIT,PROCEDURE ONLY: CPT | Performed by: PHYSICIAN ASSISTANT

## 2022-01-25 PROCEDURE — 15839 EXC EXCESSIVE SKN OTHER AREA: CPT | Performed by: PLASTIC SURGERY

## 2022-01-25 PROCEDURE — A4217 STERILE WATER/SALINE, 500 ML: HCPCS | Performed by: PLASTIC SURGERY

## 2022-01-25 PROCEDURE — 2720000010 HC SURG SUPPLY STERILE: Performed by: PLASTIC SURGERY

## 2022-01-25 PROCEDURE — 2500000003 HC RX 250 WO HCPCS: Performed by: PLASTIC SURGERY

## 2022-01-25 PROCEDURE — C1713 ANCHOR/SCREW BN/BN,TIS/BN: HCPCS | Performed by: PLASTIC SURGERY

## 2022-01-25 PROCEDURE — 3700000000 HC ANESTHESIA ATTENDED CARE: Performed by: PLASTIC SURGERY

## 2022-01-25 PROCEDURE — 7100000001 HC PACU RECOVERY - ADDTL 15 MIN: Performed by: PLASTIC SURGERY

## 2022-01-25 PROCEDURE — 7100000000 HC PACU RECOVERY - FIRST 15 MIN: Performed by: PLASTIC SURGERY

## 2022-01-25 PROCEDURE — 7100000010 HC PHASE II RECOVERY - FIRST 15 MIN: Performed by: PLASTIC SURGERY

## 2022-01-25 PROCEDURE — 11970 RPLCMT TISS XPNDR PERM IMPLT: CPT | Performed by: PLASTIC SURGERY

## 2022-01-25 PROCEDURE — 7100000011 HC PHASE II RECOVERY - ADDTL 15 MIN: Performed by: PLASTIC SURGERY

## 2022-01-25 PROCEDURE — 2580000003 HC RX 258: Performed by: PLASTIC SURGERY

## 2022-01-25 PROCEDURE — 3700000001 HC ADD 15 MINUTES (ANESTHESIA): Performed by: PLASTIC SURGERY

## 2022-01-25 PROCEDURE — C1789 PROSTHESIS, BREAST, IMP: HCPCS | Performed by: PLASTIC SURGERY

## 2022-01-25 DEVICE — SMOOTH MODERATE PLUS PROFILE XTRA 645CC  SMOOTH ROUND SILICONE
Type: IMPLANTABLE DEVICE | Site: BREAST | Status: FUNCTIONAL
Brand: MENTOR MEMORYGEL XTRA BREAST IMPLANT

## 2022-01-25 RX ORDER — DEXAMETHASONE SODIUM PHOSPHATE 10 MG/ML
INJECTION INTRAMUSCULAR; INTRAVENOUS PRN
Status: DISCONTINUED | OUTPATIENT
Start: 2022-01-25 | End: 2022-01-25 | Stop reason: SDUPTHER

## 2022-01-25 RX ORDER — CEPHALEXIN 500 MG/1
500 CAPSULE ORAL 4 TIMES DAILY
Qty: 28 CAPSULE | Refills: 0 | Status: SHIPPED | OUTPATIENT
Start: 2022-01-25 | End: 2022-02-01

## 2022-01-25 RX ORDER — SODIUM CHLORIDE 0.9 % (FLUSH) 0.9 %
5-40 SYRINGE (ML) INJECTION PRN
Status: DISCONTINUED | OUTPATIENT
Start: 2022-01-25 | End: 2022-01-25 | Stop reason: HOSPADM

## 2022-01-25 RX ORDER — SODIUM CHLORIDE 0.9 % (FLUSH) 0.9 %
5-40 SYRINGE (ML) INJECTION EVERY 12 HOURS SCHEDULED
Status: DISCONTINUED | OUTPATIENT
Start: 2022-01-25 | End: 2022-01-25 | Stop reason: HOSPADM

## 2022-01-25 RX ORDER — MIDAZOLAM HYDROCHLORIDE 1 MG/ML
INJECTION INTRAMUSCULAR; INTRAVENOUS PRN
Status: DISCONTINUED | OUTPATIENT
Start: 2022-01-25 | End: 2022-01-25 | Stop reason: SDUPTHER

## 2022-01-25 RX ORDER — HYDROCODONE BITARTRATE AND ACETAMINOPHEN 5; 325 MG/1; MG/1
1 TABLET ORAL PRN
Status: COMPLETED | OUTPATIENT
Start: 2022-01-25 | End: 2022-01-25

## 2022-01-25 RX ORDER — ONDANSETRON 4 MG/1
4 TABLET, FILM COATED ORAL DAILY PRN
Qty: 12 TABLET | Refills: 1 | Status: SHIPPED | OUTPATIENT
Start: 2022-01-25 | End: 2022-06-10 | Stop reason: ALTCHOICE

## 2022-01-25 RX ORDER — LIDOCAINE HYDROCHLORIDE 20 MG/ML
INJECTION, SOLUTION INTRAVENOUS PRN
Status: DISCONTINUED | OUTPATIENT
Start: 2022-01-25 | End: 2022-01-25 | Stop reason: SDUPTHER

## 2022-01-25 RX ORDER — FENTANYL CITRATE 50 UG/ML
25 INJECTION, SOLUTION INTRAMUSCULAR; INTRAVENOUS EVERY 5 MIN PRN
Status: DISCONTINUED | OUTPATIENT
Start: 2022-01-25 | End: 2022-01-25 | Stop reason: HOSPADM

## 2022-01-25 RX ORDER — HYDROCODONE BITARTRATE AND ACETAMINOPHEN 5; 325 MG/1; MG/1
1 TABLET ORAL EVERY 6 HOURS PRN
Qty: 20 TABLET | Refills: 0 | Status: SHIPPED | OUTPATIENT
Start: 2022-01-25 | End: 2022-02-01

## 2022-01-25 RX ORDER — SODIUM CHLORIDE 9 MG/ML
25 INJECTION, SOLUTION INTRAVENOUS PRN
Status: DISCONTINUED | OUTPATIENT
Start: 2022-01-25 | End: 2022-01-25 | Stop reason: HOSPADM

## 2022-01-25 RX ORDER — BUPIVACAINE HYDROCHLORIDE AND EPINEPHRINE 2.5; 5 MG/ML; UG/ML
INJECTION, SOLUTION EPIDURAL; INFILTRATION; INTRACAUDAL; PERINEURAL PRN
Status: DISCONTINUED | OUTPATIENT
Start: 2022-01-25 | End: 2022-01-25 | Stop reason: ALTCHOICE

## 2022-01-25 RX ORDER — ROCURONIUM BROMIDE 10 MG/ML
INJECTION, SOLUTION INTRAVENOUS PRN
Status: DISCONTINUED | OUTPATIENT
Start: 2022-01-25 | End: 2022-01-25 | Stop reason: SDUPTHER

## 2022-01-25 RX ORDER — FENTANYL CITRATE 50 UG/ML
INJECTION, SOLUTION INTRAMUSCULAR; INTRAVENOUS PRN
Status: DISCONTINUED | OUTPATIENT
Start: 2022-01-25 | End: 2022-01-25 | Stop reason: SDUPTHER

## 2022-01-25 RX ORDER — HYDROCODONE BITARTRATE AND ACETAMINOPHEN 5; 325 MG/1; MG/1
2 TABLET ORAL PRN
Status: COMPLETED | OUTPATIENT
Start: 2022-01-25 | End: 2022-01-25

## 2022-01-25 RX ORDER — ONDANSETRON 2 MG/ML
INJECTION INTRAMUSCULAR; INTRAVENOUS PRN
Status: DISCONTINUED | OUTPATIENT
Start: 2022-01-25 | End: 2022-01-25 | Stop reason: SDUPTHER

## 2022-01-25 RX ORDER — NEOSTIGMINE METHYLSULFATE 1 MG/ML
INJECTION, SOLUTION INTRAVENOUS PRN
Status: DISCONTINUED | OUTPATIENT
Start: 2022-01-25 | End: 2022-01-25 | Stop reason: SDUPTHER

## 2022-01-25 RX ORDER — ONDANSETRON 2 MG/ML
4 INJECTION INTRAMUSCULAR; INTRAVENOUS
Status: DISCONTINUED | OUTPATIENT
Start: 2022-01-25 | End: 2022-01-25 | Stop reason: HOSPADM

## 2022-01-25 RX ORDER — PHENYLEPHRINE HCL IN 0.9% NACL 1 MG/10 ML
SYRINGE (ML) INTRAVENOUS PRN
Status: DISCONTINUED | OUTPATIENT
Start: 2022-01-25 | End: 2022-01-25 | Stop reason: SDUPTHER

## 2022-01-25 RX ORDER — GLYCOPYRROLATE 1 MG/5 ML
SYRINGE (ML) INTRAVENOUS PRN
Status: DISCONTINUED | OUTPATIENT
Start: 2022-01-25 | End: 2022-01-25 | Stop reason: SDUPTHER

## 2022-01-25 RX ORDER — PROPOFOL 10 MG/ML
INJECTION, EMULSION INTRAVENOUS PRN
Status: DISCONTINUED | OUTPATIENT
Start: 2022-01-25 | End: 2022-01-25 | Stop reason: SDUPTHER

## 2022-01-25 RX ADMIN — FENTANYL CITRATE 50 MCG: 50 INJECTION, SOLUTION INTRAMUSCULAR; INTRAVENOUS at 12:02

## 2022-01-25 RX ADMIN — Medication 3 MG: at 12:27

## 2022-01-25 RX ADMIN — PROPOFOL 150 MG: 10 INJECTION, EMULSION INTRAVENOUS at 11:33

## 2022-01-25 RX ADMIN — HYDROCODONE BITARTRATE AND ACETAMINOPHEN 2 TABLET: 5; 325 TABLET ORAL at 14:15

## 2022-01-25 RX ADMIN — Medication 50 MCG: at 12:14

## 2022-01-25 RX ADMIN — Medication 0.4 MG: at 12:27

## 2022-01-25 RX ADMIN — LIDOCAINE HYDROCHLORIDE 60 MG: 20 INJECTION, SOLUTION INTRAVENOUS at 11:33

## 2022-01-25 RX ADMIN — FENTANYL CITRATE 100 MCG: 50 INJECTION, SOLUTION INTRAMUSCULAR; INTRAVENOUS at 11:33

## 2022-01-25 RX ADMIN — MIDAZOLAM 2 MG: 1 INJECTION INTRAMUSCULAR; INTRAVENOUS at 11:26

## 2022-01-25 RX ADMIN — DEXAMETHASONE SODIUM PHOSPHATE 10 MG: 10 INJECTION INTRAMUSCULAR; INTRAVENOUS at 11:40

## 2022-01-25 RX ADMIN — ROCURONIUM BROMIDE 50 MG: 10 INJECTION, SOLUTION INTRAVENOUS at 11:34

## 2022-01-25 RX ADMIN — PROPOFOL 30 MG: 10 INJECTION, EMULSION INTRAVENOUS at 12:31

## 2022-01-25 RX ADMIN — PROPOFOL 50 MG: 10 INJECTION, EMULSION INTRAVENOUS at 12:27

## 2022-01-25 RX ADMIN — Medication 2000 MG: at 11:41

## 2022-01-25 RX ADMIN — SODIUM CHLORIDE: 9 INJECTION, SOLUTION INTRAVENOUS at 11:26

## 2022-01-25 RX ADMIN — ONDANSETRON HYDROCHLORIDE 4 MG: 2 SOLUTION INTRAMUSCULAR; INTRAVENOUS at 12:19

## 2022-01-25 RX ADMIN — Medication 50 MCG: at 12:12

## 2022-01-25 ASSESSMENT — PULMONARY FUNCTION TESTS
PIF_VALUE: 8
PIF_VALUE: 13
PIF_VALUE: 1
PIF_VALUE: 13
PIF_VALUE: 2
PIF_VALUE: 12
PIF_VALUE: 12
PIF_VALUE: 1
PIF_VALUE: 12
PIF_VALUE: 36
PIF_VALUE: 13
PIF_VALUE: 13
PIF_VALUE: 10
PIF_VALUE: 13
PIF_VALUE: 13
PIF_VALUE: 11
PIF_VALUE: 18
PIF_VALUE: 12
PIF_VALUE: 13
PIF_VALUE: 12
PIF_VALUE: 12
PIF_VALUE: 10
PIF_VALUE: 12
PIF_VALUE: 3
PIF_VALUE: 10
PIF_VALUE: 12
PIF_VALUE: 11
PIF_VALUE: 1
PIF_VALUE: 12
PIF_VALUE: 18
PIF_VALUE: 12
PIF_VALUE: 12
PIF_VALUE: 1
PIF_VALUE: 12
PIF_VALUE: 13
PIF_VALUE: 12
PIF_VALUE: 13
PIF_VALUE: 12
PIF_VALUE: 12
PIF_VALUE: 3
PIF_VALUE: 11
PIF_VALUE: 12
PIF_VALUE: 6
PIF_VALUE: 12
PIF_VALUE: 13
PIF_VALUE: 12
PIF_VALUE: 12
PIF_VALUE: 13
PIF_VALUE: 1
PIF_VALUE: 1
PIF_VALUE: 19
PIF_VALUE: 12
PIF_VALUE: 14
PIF_VALUE: 8
PIF_VALUE: 13
PIF_VALUE: 16
PIF_VALUE: 12
PIF_VALUE: 12
PIF_VALUE: 13
PIF_VALUE: 13
PIF_VALUE: 12
PIF_VALUE: 10
PIF_VALUE: 10
PIF_VALUE: 12
PIF_VALUE: 12
PIF_VALUE: 13
PIF_VALUE: 2
PIF_VALUE: 3

## 2022-01-25 ASSESSMENT — PAIN DESCRIPTION - DIRECTION
RADIATING_TOWARDS: UNDER ARMS
RADIATING_TOWARDS: UNDER ARMS

## 2022-01-25 ASSESSMENT — PAIN DESCRIPTION - DESCRIPTORS
DESCRIPTORS: ACHING;DISCOMFORT;SORE
DESCRIPTORS: ACHING;DISCOMFORT;SORE

## 2022-01-25 ASSESSMENT — PAIN - FUNCTIONAL ASSESSMENT: PAIN_FUNCTIONAL_ASSESSMENT: 0-10

## 2022-01-25 ASSESSMENT — PAIN DESCRIPTION - ORIENTATION
ORIENTATION: LEFT;RIGHT
ORIENTATION: LEFT;RIGHT

## 2022-01-25 ASSESSMENT — PAIN DESCRIPTION - LOCATION
LOCATION: BREAST
LOCATION: BREAST

## 2022-01-25 ASSESSMENT — PAIN SCALES - GENERAL
PAINLEVEL_OUTOF10: 6
PAINLEVEL_OUTOF10: 4
PAINLEVEL_OUTOF10: 7

## 2022-01-25 ASSESSMENT — LIFESTYLE VARIABLES: SMOKING_STATUS: 0

## 2022-01-25 ASSESSMENT — PAIN DESCRIPTION - PAIN TYPE
TYPE: SURGICAL PAIN
TYPE: SURGICAL PAIN

## 2022-01-25 ASSESSMENT — PAIN DESCRIPTION - ONSET
ONSET: GRADUAL
ONSET: GRADUAL

## 2022-01-25 NOTE — PROGRESS NOTES
Reviewed discharge instructions with patient and spouse including discharge medications, follow up, incision care along with s/s of infection. Patient verbalized understanding. Patient aware scripts are at pharmacy and printed [employer] script sent along with discharge instructions. No further questions at this time.

## 2022-01-25 NOTE — H&P
Department of Plastic Surgery - Adult  Attending Consult Note      CHIEF COMPLAINT:   History of breast cancer, breast reconstruction    History Obtained From:  patient    HISTORY OF PRESENT ILLNESS:                The patient is a 64 y.o. female who presents for   Follow up today from left prophylactic mastectomy to my bilateral for stage breast reconstruction using tissue expanders and AlloDerm. Denies fever, nausea, vomiting, leg pain or swelling, pain is absent. She voices no complaints with continued bilateral breast tissue expansion.   The patient is ready to proceed with second stage breast reconstruction with removal of tissue expanders and placement of silicone breast implants    Past Medical History:    Past Medical History:   Diagnosis Date    Anxiety     Cancer ([facility] [address] 05/2021    breast     Depression     History of cancer chemotherapy 6/2021-8/30/2021    Hypothyroidism      Past Surgical History:    Past Surgical History:   Procedure Laterality Date    BREAST BIOPSY Left     BREAST RECONSTRUCTION Bilateral 10/28/2021    LEFT PROPHYLACTIC MASTECTOMY WITH BILTATER FIRST STAGE RECONSTRUCTION USING TISSUE EXPANDER AND ALLODERM performed by Raciel Hunter MD at [address] [facility] Right 10/28/2021    RIGHT BREAST MASTECTOMY WITH SENTINEL LYMPH NODE BIOPSY POSSIBLE AXILLARY LYMPH NODE DISSECTION - Yolanda rAtis performed by Giovanna Rose MD at [address] [facility] Left     Left ovary gone due to endometriosis - has cervix and right ovary    PORT SURGERY Left 6/21/2021    MEDI PORT INSERTION LEFT SIDE PLACEMENT performed by Giovanna Rose MD at [address] [place] [address]. Right 5/6/2021    US BREAST NEEDLE BIOPSY RIGHT 5/6/2021 SHEREE GUADARRAMA [facility]     Current Medications:   Current Facility-Administered Medications   Medication Dose Route Frequency Provider Last Rate Last Admin    sodium chloride flush 0.9 % injection 5-40 mL  5-40 mL IntraVENous 2 times per day Ashutosh Andersen Mathieu Palmer        sodium chloride flush 0.9 % injection 5-40 mL  5-40 mL IntraVENous PRN TISH Silverman        0.9 % sodium chloride infusion  25 mL IntraVENous PRN TISH Silverman        ceFAZolin (ANCEF) 2000 mg in sterile water 20 mL IV syringe  2,000 mg IntraVENous On Call to [address] [place], [place]        fentaNYL (SUBLIMAZE) injection 25 mcg  25 mcg IntraVENous Q5 Min PRN Erika Vance MD        HYDROcodone-acetaminophen [facility]) 5-325 MG per tablet 1 tablet  1 tablet Oral PRN Erika Vance MD        Or    HYDROcodone-acetaminophen (NORCO) 5-325 MG per tablet 2 tablet  2 tablet Oral PRN Erika Vance MD        ondansetron [facility] injection 4 mg  4 mg IntraVENous Once PRN Erika Vance MD           Allergies:  Oxycodone-acetaminophen    Social History:   Social History     Socioeconomic History    Marital status:      Spouse name: Not on file    Number of children: Not on file    Years of education: Not on file    Highest education level: Not on file   Occupational History    Not on file   Tobacco Use    Smoking status: Former Smoker     Packs/day: 1.00     Years: 10.00     Pack years: 10.00     Types: Cigarettes     Quit date:      Years since quittin.0    Smokeless tobacco: Never Used   Vaping Use    Vaping Use: Never used   Substance and Sexual Activity    Alcohol use: Not Currently    Drug use: Never    Sexual activity: Not Currently   Other Topics Concern    Not on file   Social History Narrative    Not on file     Social Determinants of Health     Financial Resource Strain: Low Risk     Difficulty of Paying Living Expenses: Not hard at all   Food Insecurity: No Food Insecurity    Worried About Running Out of Food in the Last Year: Never true    Laura of Food in the Last Year: Never true   Transportation Needs:     Lack of Transportation (Medical): Not on file    Lack of Transportation (Non-Medical):  Not on file   Physical Activity:     Days of Exercise per Week: Not on file    Minutes of Exercise per Session: Not on file   Stress:     Feeling of Stress : Not on file   Social Connections:     Frequency of Communication with Friends and Family: Not on file    Frequency of Social Gatherings with Friends and Family: Not on file    Attends [employer] Services: Not on file    Active Member of [address] [place] [address] or Organizations: Not on file    Attends Club or Organization Meetings: Not on file    Marital Status: Not on file   Intimate Partner Violence:     Fear of Current or Ex-Partner: Not on file    Emotionally Abused: Not on file    Physically Abused: Not on file    Sexually Abused: Not on file   Housing Stability:     Unable to Pay for Housing in the Last Year: Not on file    Number of [place] in the Last Year: Not on file    Unstable Housing in the Last Year: Not on file     Family History:   Family History   Problem Relation Age of Onset    No Known Problems Father     Cervical Cancer Sister     Uterine Cancer Sister         unsure    No Known Problems Brother     Cancer Paternal Aunt        REVIEW OF SYSTEMS:    CONSTITUTIONAL:  negative for  fevers, chills, sweats and fatigue  EYES: negative for dipolpia or acute vision loss. RESPIRATORY:  negative for  dry cough, cough with sputum, dyspnea, wheezing and chest pain  HENT:negative for pain, headache, difficulty swallowing or nose bleeds. CARDIOVASCULAR:  negative for  chest pain, dyspnea, palpitations, syncope  GASTROINTESTINAL:  negative for nausea, vomiting, change in bowel habits, diarrhea, and constipation   EXTREMITIES: negative for edema  MUSCULOSKELETAL: negative for muscle weakness  SKIN: negative for itching or rashes. HEME: negative for easy brusing or bleeding  BEHAVIOR/PSYCH:  negative for poor appetite, increased appetite, decreased sleep and poor concentration    Left Breast- Clean, dry, and intact, no drainage. no signs of infection.    Right Breast- Clean, dry, and intact, no drainage. no signs of infection. There Is no notable fluid wave on palpation           Assessment:         Patient Active Problem List   Diagnosis    Acquired hypothyroidism    Malignant neoplasm of upper-outer quadrant of right breast in female, estrogen receptor negative ([facility] [address]         Plan:      Status post first stage breast reconstruction     -Continue wearing surgical bra at all times padding to the right lower breast pole. -No driving while taking narcotic pain medication or while impaired second to limited UE ROM     May introduce unrestricted activity.     Left Expander- 650ml  Right Expander- 650ml     Left Postfill- 670ml  Right Postfill- 670ml        Saline-filled Breast Implants:   Benefits: Less threatening to your health should one rupture. If an implant does rupture, the patient will know immediately. Risks: Should an implant rupture, the woman will have one or both breasts that appear deflated and will need immediate attention to restore a normal appearance. Greater chance of rippling or wrinkling. This is often considered the greatest downfall of saline implants. Often described as feeling like a water balloon. Silicone Breast Implants:   Benefits: Many Women believe they are more natural feeling implants. Generally smoother and softer to the touch. Less likely to wrinkle or ripple than saline implants. If a Silicone implant ruptures the woman will not see a physical difference for some time if at all. Downfall: MRIs needed every 2 years to detect ruptured implants.     The patient was educated on the risks involving (Breast Implant Associated-Anaplastic Large Cell Lymphoma (JJ-ALCL)). JJ-ALCL is not a breast cancer, but a rare and treatable T-cell lymphoma that usually develops as a fluid swelling around breast implants. The lifetime risk for this disease appears to be about 1 case for every 30,000 textured implants.  Thus far, there have been no confirmed cases of JJ-ALCL in women who have had only smooth-surface breast implants. The FDA is not recommending removal of textured implants. Rather, the FDA recommends, as do I, that every woman conduct regular self-examination. The patient was educated that if she does develop JJ-ALCL she may require additional treatment such as radiation or chemotherapy along with removal of the breast implant and surrounding scar tissue.     She chooses silicone breast implants     Surgical plan: Second stage breast reconstruction with exchange of bilateral tissue expanders for permanent silicone breast implants. The patient does not have significant excess lateral chest wall soft tissues, therefore extension of incisions would not be necessary.     The risks, benefits and options were discussed with the pt. The risks included but not limited to pain, bleeding, infection, heavy scarring, damage to surrounding structures,  and need for further procedures. Other risks including but not limited to asymmetry, loss of nipple or/and areola, loss of sensation to nipple and areola, inability to breast feed, seroma, hematoma, implant failure, capsular contracture, and fat necrosis were also discussed with the patient. All of her questions were answered to her satisfaction and she agrees to proceed with the operation. Attestation    No change in patient's H & P. I have reviewed the procedure with the patient as well as the risk and benefits. They have no further questions and agree to proceed with surgery.     Keysha Mcdonough MD   9:28 AM  1/25/2022

## 2022-01-25 NOTE — OP NOTE
Operative Note      Patient: Amy Henao  YOB: 1965  MRN: [id number]    Date of Procedure: 1/25/2022    Pre-Op Diagnosis: HISTORY OF BREAST CANCER    Post-Op Diagnosis: Same       Procedure(s):  SECOND STAGE BREAST RECONSTRUCTION WITH EXCHANGE OF TISSUE EXPANDERS FOR PERMENANT SILICON BREAST IMPLANTS, DIRECT EXCISION OF BILATERAL LATERAL CHEST WALL SKIN REDUNDANCY (15 cm ON EACH SIDE)    Surgeon(s):  Dominique Rubio MD    Assistant:   Physician Assistant: TISH Armendariz  Resident: Sonia Lewis DO    Anesthesia: General    Estimated Blood Loss (mL): 52VX    Complications: None    Specimens:   ID Type Source Tests Collected by Time Destination   A : right medial mastectomy scar Tissue Tissue SURGICAL PATHOLOGY Dominique Rubio MD 1/25/2022 1153    B : right lateral mastectomy scar Tissue Tissue SURGICAL PATHOLOGY Dominique Rubio MD 1/25/2022 1154    C : left mastectomy scar Tissue Tissue SURGICAL PATHOLOGY Dominique Rubio MD 1/25/2022 1155        Implants:  * No implants in log *      Drains:   Closed/Suction Drain Lateral;Left Chest Bulb [address] (Active)       Closed/Suction Drain Right Breast (Active)           Detailed Description of Procedure:                 ASSISTANT:  Prabhakar Whiteside PA-C. PA was required for the case due  to lack of adequately trained assistant; was involved in prepping,  draping, retracting, dressing and suturing. SPECIMEN:  Bilateral breast skin and tissue               INDICATIONS:  The patient is a 64 y.o. female with history of breast cancer. The patient elected to proceed with replacement of bilateral tissue expanders for more permanent silicone implants, as well as direct excision of the lateral bilateral chest wall skin redundancy. Risks, benefits, and alternatives were explained to her including but not limited to bleeding, scarring, infection, DVT, anesthesia related complications, death, and need for further surgery.   She understood and suture followed by 3-0 Monocryl deep dermal and 4-0 Monocryl running subcuticular sutures. 0.25% Marcaine with 1:100,000 epinephrine was injected along the incision lines. Dermabond was applied followed by 1 inch Steri-Strips, Kerlix, fluffs, and a surgical bra. She emerged from anesthesia without complication and taken to PACU in good condition.            Electronically signed by Lewis Claire MD on 1/25/2022 at 12:30 PM

## 2022-01-25 NOTE — ANESTHESIA POSTPROCEDURE EVALUATION
Department of Anesthesiology  Postprocedure Note    Patient: Salena Ames  MRN: [id number]  YOB: 1965  Date of evaluation: 1/25/2022  Time:  1:35 PM     Procedure Summary     Date: 01/25/22 Room / Location: [facility] OR 10 / [facility]    Anesthesia Start: 1126 Anesthesia Stop: 8959    Procedure: SECOND STAGE BREAST RECONSTRUCTION WITH EXCHANGE OF TISSUE EXPANDERS FOR PERMENANT SILICON BREAST IMPLANTS (Bilateral Breast) Diagnosis: (HISTORY OF BREAST CANCER)    Surgeons: Ozzie Casillas MD Responsible Provider: Johnathan Robison MD    Anesthesia Type: general ASA Status: 3          Anesthesia Type: general    Dhiraj Phase I: Dhiraj Score: 9    Dhiraj Phase II:      Last vitals: Reviewed and per EMR flowsheets.        Anesthesia Post Evaluation    Patient location during evaluation: PACU  Patient participation: complete - patient participated  Level of consciousness: awake  Pain score: 0  Airway patency: patent  Nausea & Vomiting: no nausea  Complications: no  Cardiovascular status: hemodynamically stable  Respiratory status: acceptable  Hydration status: stable

## 2022-01-25 NOTE — ANESTHESIA PRE PROCEDURE
Department of Anesthesiology  Preprocedure Note       Name:  Deondre Browning   Age:  64 y.o.  :  1965                                          MRN:  [id number]         Date:  2022      Surgeon: Dakota Tellez):  Jocy Amador MD    Procedure: Procedure(s):  SECOND STAGE BREAST RECONSTRUCTION WITH EXCHANGE OF TISSUE EXPANDERS FOR PERMENANT SILICON BREAST IMPLANTS    Medications prior to admission:   Prior to Admission medications    Medication Sig Start Date End Date Taking? Authorizing Provider   levothyroxine (SYNTHROID) 50 MCG tablet Take 1 tablet by mouth Daily 22   Hannah Mcdonald DO   azelastine (ASTELIN) 0.1 % nasal spray 1 spray by Nasal route 2 times daily Use in each nostril as directed 22   Hannah Mcdonald DO   Ascorbic Acid (VITAMIN C) 250 MG tablet Take 500 mg by mouth daily    Historical Provider, MD   loratadine (CLARITIN) 10 MG tablet Take 10 mg by mouth daily    Historical Provider, MD   APPLE CIDER VINEGAR PO Take by mouth    Historical Provider, MD   FIBER PO Take by mouth    Historical Provider, MD   azelastine (ASTELIN) 0.1 % nasal spray 1 spray by Nasal route 2 times daily Use in each nostril as directed 20   Nain Pandya,    vitamin B-12 (CYANOCOBALAMIN) 100 MCG tablet Take 50 mcg by mouth daily    Historical Provider, MD       Current medications:    No current facility-administered medications for this visit. No current outpatient medications on file.      Facility-Administered Medications Ordered in Other Visits   Medication Dose Route Frequency Provider Last Rate Last Admin    sodium chloride flush 0.9 % injection 5-40 mL  5-40 mL IntraVENous 2 times per day TISH Weaver        sodium chloride flush 0.9 % injection 5-40 mL  5-40 mL IntraVENous PRN TISH Weaver        0.9 % sodium chloride infusion  25 mL IntraVENous PRN TISH Weaver        ceFAZolin (ANCEF) 2000 mg in sterile water 20 mL IV syringe  2,000 mg IntraVENous On Call to [address] [place], [place]        fentaNYL (SUBLIMAZE) injection 25 mcg  25 mcg IntraVENous Q5 Min PRN Evelia Weir MD        HYDROcodone-acetaminophen [facility]) 5-325 MG per tablet 1 tablet  1 tablet Oral PRN Evelia Weir MD        Or    HYDROcodone-acetaminophen (NORCO) 5-325 MG per tablet 2 tablet  2 tablet Oral PRN Evelia Weir MD        ondansetron [facility]) injection 4 mg  4 mg IntraVENous Once PRN Evelia Weir MD           Allergies:     Allergies   Allergen Reactions    Oxycodone-Acetaminophen Nausea And Vomiting       Problem List:    Patient Active Problem List   Diagnosis Code    Acquired hypothyroidism E03.9    Malignant neoplasm of upper-outer quadrant of right breast in female, estrogen receptor negative ([facility] C50.411, Z17.1       Past Medical History:        Diagnosis Date    Anxiety     Cancer ([facility] 05/2021    breast     Depression     History of cancer chemotherapy 6/2021-8/30/2021    Hypothyroidism        Past Surgical History:        Procedure Laterality Date    BREAST BIOPSY Left     BREAST RECONSTRUCTION Bilateral 10/28/2021    LEFT PROPHYLACTIC MASTECTOMY WITH BILTATER FIRST STAGE RECONSTRUCTION USING TISSUE EXPANDER AND ALLODERM performed by Raciel Hunter MD at [address] [facility] Right 10/28/2021    RIGHT BREAST MASTECTOMY WITH SENTINEL LYMPH NODE BIOPSY POSSIBLE AXILLARY LYMPH NODE DISSECTION - Yolanda Artis performed by Giovanna Rose MD at [address] [facility] Left     Left ovary gone due to endometriosis - has cervix and right ovary    PORT SURGERY Left 6/21/2021    MEDI PORT INSERTION LEFT SIDE PLACEMENT performed by Giovanna Rose MD at [address] [place] [address]. Right 5/6/2021    US BREAST NEEDLE BIOPSY RIGHT 5/6/2021 SHEREE GUADARRAMA [facility]       Social History:    Social History     Tobacco Use    Smoking status: Former Smoker     Packs/day: 1.00     Years: 10.00     Pack years: 10.00     Types: Cigarettes     Quit available  Confirmed by Saturnino Vital ([id number]) on 8/13/2021 4:06:52 PM       10/12/21                               Any Other     Procedure     Type of Study      TTE procedure:Echo Complete W/Doppler & Color Flow. Procedure Date  Date: 10/12/2021 Start: 09:39 AM     Study Location: Echo Lab  Technical Quality: Adequate visualization     Indications:LV function and Post - Chemotherapy.     Patient Status: Routine     Height: 63 inches Weight: 150 pounds BSA: 1.71 m^2 BMI: 26.57 kg/m^2     HR: 85 bpm BP: 104/70 mmHg      Findings      Left Ventricle   Normal left ventricular chamber size. Normal left ventricular systolic   function. Visually estimated LVEF is 60 %. No wall motion abnormalities. Normal diastolic function. Normal left atrial pressure. Right Ventricle   Normal right ventricle structure and function. Left Atrium   Normal left atrium. Right Atrium   Normal right atrium. Mitral Valve   Physiologic and/or trace mitral regurgitation is present. No evidence of hemodynamically significant mitral stenosis. Tricuspid Valve   Normal tricuspid valve structure and function. Aortic Valve   Normal leaflet mobility. No aortic stenosis. No aortic regurgitation. Pulmonic Valve   Normal pulmonic valve structure and function. Pericardial Effusion   No evidence for hemodynamically significant pericardial effusion. Pleural Effusion   No evidence of pleural effusion. Aorta   Normal aortic root and ascending aorta. Miscellaneous   The inferior vena cava diameter is normal with normal respiratory   variation. Conclusions      Summary   Normal left ventricular chamber size. Normal left ventricular systolic   function. Visually estimated LVEF is 60 %. No wall motion abnormalities. Normal diastolic function. Normal left atrial pressure. Normal right ventricle structure and function. Normal left atrium. Normal right atrium.    Less than mild valvular stenosis/ regurgitation. Unable to estimate Pa pressure. Global longitudinal strain analysis is normal at -24.2%. No comparison study available. Anesthesia Evaluation  Patient summary reviewed and Nursing notes reviewed no history of anesthetic complications:   Airway: Mallampati: II  TM distance: >3 FB   Neck ROM: full  Mouth opening: > = 3 FB Dental:    (+) implants      Pulmonary:Negative Pulmonary ROS and normal exam  breath sounds clear to auscultation      (-) not a current smoker                           Cardiovascular:Negative CV ROS  Exercise tolerance: good (>4 METS),         ECG reviewed  Rhythm: regular  Rate: normal  Echocardiogram reviewed         Beta Blocker:  Not on Beta Blocker         Neuro/Psych:   Negative Neuro/Psych ROS              GI/Hepatic/Renal: Neg GI/Hepatic/Renal ROS            Endo/Other:    (+) hypothyroidism::., malignancy/cancer (  breast ). ROS comment: Procedures (1 of 2)  LEFT PROPHYLACTIC MASTECTOMY WITH BILTATER FIRST STAGE RECONSTRUCTION USING TISSUE EXPANDER AND ALLODERM (Bilateral Breast)   Abdominal:             Vascular: negative vascular ROS. Other Findings:               Anesthesia Plan      general     ASA 3     (IV left hand)  Induction: intravenous. BIS  MIPS: Postoperative opioids intended and Prophylactic antiemetics administered. Anesthetic plan and risks discussed with patient. Use of blood products discussed with patient whom consented to blood products. Plan discussed with CRNA.                   Sadaf Dumont MD   1/25/2022

## 2022-01-26 ENCOUNTER — TELEPHONE (OUTPATIENT)
Dept: BREAST CENTER | Age: 57
End: 2022-01-26

## 2022-01-27 NOTE — PROGRESS NOTES
Subjective: Follow up today from second stage breast reconstruction with exchange of bilateral tissue expanders for permanent silicone breast implants with bilateral lateral upper chest wall tissue redundancy excision. Denies fever, nausea, vomiting, leg pain or swelling, pain is absent. The pt states she is  taking her antibiotic and continues to wear her surgical bra. She is ambulating at home. She is  wearing her surgical bra at all times. Objective: There were no vitals taken for this visit. Left Breast- Clean, dry, and intact, no drainage. Steri strips intact, no signs of infection. Right Breast Clean, dry, and intact, no drainage. Steri strips intact, no signs of infection.          Assessment:    Patient Active Problem List   Diagnosis    Acquired hypothyroidism    Malignant neoplasm of upper-outer quadrant of right breast in female, estrogen receptor negative ([facility]    Post-op pain     Pathology report:[facility] [address]            [address] [facility] 1, [address], [address] [place] [address]               FINAL SURGICAL PATHOLOGY REPORT     NAME:           Evon Shields             Date of       01/25/2022                                            Collection:   Medical Record   [id number]              Date of       01/26/2022   Number:                                  Receipt:   Age:  63 Y        Sex:  F                Date          01/28/2022 13:18                                            Reported:   Date Of Birth:   1965   Financial        [id number]             Admitting     MANDY COVARRUBIAS   Number:

## 2022-01-31 ENCOUNTER — OFFICE VISIT (OUTPATIENT)
Dept: SURGERY | Age: 57
End: 2022-01-31

## 2022-01-31 VITALS — TEMPERATURE: 96.4 F

## 2022-01-31 DIAGNOSIS — Z98.890 S/P BREAST RECONSTRUCTION: Primary | ICD-10-CM

## 2022-01-31 PROCEDURE — 99024 POSTOP FOLLOW-UP VISIT: CPT | Performed by: PHYSICIAN ASSISTANT

## 2022-02-09 NOTE — PROGRESS NOTES
Subjective: Follow up today from second stage breast reconstruction with exchange of bilateral tissue expanders for permanent silicone breast implants with bilateral lateral upper chest wall tissue redundancy excision. Denies fever, nausea, vomiting, leg pain or swelling, pain is mild to moderate at the right lateral mastectomy scar line. Objective: There were no vitals taken for this visit. Left Breast- Clean, dry, and intact, no drainage. no signs of infection. Right Breast Clean, dry, and intact, no drainage. no signs of infection.    Mild tenderness to palpation along the right lateral mastectomy scar line      Assessment:    Patient Active Problem List   Diagnosis    Acquired hypothyroidism    Malignant neoplasm of upper-outer quadrant of right breast in female, estrogen receptor negative ([facility]    Post-op pain     Pathology report:[facility] [address] [place] [address]            [address], [address], [address] [place] [address]               FINAL SURGICAL PATHOLOGY REPORT     NAME:           Nadia Gallardo, [place]             Date of       01/25/2022                                            Collection:   Medical Record   [id number]              Date of       01/26/2022   Number:                                  Receipt:   Age:  63 Y        Sex:  F                Date          01/28/2022 13:18                                            Reported:   Date Of Birth:   1965   Financial        [id number]             Admitting     MANDY COVARRUBIAS   Number:                                  Physician:   Patient          EDUARDO EARLY         Ordering      MANDY COVARRUBIAS Location:                                Physician:     Accession Number:  [id number]                                          Additional Physicians:GRETCHEN FARRELL       Diagnosis:   A.  Right medial mastectomy scar, excision: Cicatrix with foreign body   giant cell reaction     B.  Right lateral mastectomy scar, excision: Cicatrix and fat necrosis     C.  Left mastectomy scar, excision: Cicatrix                                              BUDDY Troncoso                                   (Electronic Signature)     Plan:     -Okay to transition to sports bra at this time or bra of her choice.  -Educated the pt that her pain is proportionate to the amount of surgery she had and she may begin using Ibuprofen in addition to pain medication.    -Okay to increase activity ad jeanne.  -OK to shower at this time. Advised the patient that they can allow soap and water to rinse of the incision site while showering. Once they are done in the shower they are to pat dry the incision site with a clean paper towel. No baths, hot tubs or soaking of the wound site at this time. Pt voices understanding. 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 I will give her an additional 3 weeks off of work as to allow her postoperative incisions to heal further and allow her pain to settle. We will see her back in 3 weeks      At that time we will also assess for bilateral breast reconstruction settling and to plan for possible nipple and areolar reconstruction and tattooing. Patient voices understanding she understands that with any concerns of infection or changes in her bilateral breast reconstruction she is to contact our office to be reevaluated. Call office with concerns or signs of infection.       TISH Otero

## 2022-02-11 ENCOUNTER — OFFICE VISIT (OUTPATIENT)
Dept: BREAST CENTER | Age: 57
End: 2022-02-11
Payer: COMMERCIAL

## 2022-02-11 VITALS
SYSTOLIC BLOOD PRESSURE: 128 MMHG | DIASTOLIC BLOOD PRESSURE: 72 MMHG | TEMPERATURE: 98.2 F | HEIGHT: 63 IN | RESPIRATION RATE: 14 BRPM | OXYGEN SATURATION: 98 % | WEIGHT: 139 LBS | HEART RATE: 82 BPM | BODY MASS INDEX: 24.63 KG/M2

## 2022-02-11 DIAGNOSIS — Z85.3 PERSONAL HISTORY OF BREAST CANCER: ICD-10-CM

## 2022-02-11 PROCEDURE — 36589 REMOVAL TUNNELED CV CATH: CPT | Performed by: SURGERY

## 2022-02-11 PROCEDURE — 99213 OFFICE O/P EST LOW 20 MIN: CPT | Performed by: SURGERY

## 2022-02-11 RX ORDER — LIDOCAINE HYDROCHLORIDE AND EPINEPHRINE 10; 10 MG/ML; UG/ML
20 INJECTION, SOLUTION INFILTRATION; PERINEURAL ONCE
Status: COMPLETED | OUTPATIENT
Start: 2022-02-11 | End: 2022-02-11

## 2022-02-11 RX ORDER — VITAMIN B COMPLEX
1000 TABLET ORAL DAILY
COMMUNITY

## 2022-02-11 RX ADMIN — LIDOCAINE HYDROCHLORIDE AND EPINEPHRINE 8 ML: 10; 10 INJECTION, SOLUTION INFILTRATION; PERINEURAL at 08:48

## 2022-02-14 ENCOUNTER — OFFICE VISIT (OUTPATIENT)
Dept: SURGERY | Age: 57
End: 2022-02-14

## 2022-02-14 VITALS — TEMPERATURE: 97.2 F

## 2022-02-14 DIAGNOSIS — Z98.890 S/P BREAST RECONSTRUCTION: Primary | ICD-10-CM

## 2022-02-14 PROCEDURE — 99024 POSTOP FOLLOW-UP VISIT: CPT | Performed by: PHYSICIAN ASSISTANT

## 2022-03-09 ENCOUNTER — OFFICE VISIT (OUTPATIENT)
Dept: SURGERY | Age: 57
End: 2022-03-09

## 2022-03-09 VITALS — TEMPERATURE: 96.4 F

## 2022-03-09 DIAGNOSIS — Z98.890 S/P BREAST RECONSTRUCTION: Primary | ICD-10-CM

## 2022-03-09 PROCEDURE — 99024 POSTOP FOLLOW-UP VISIT: CPT | Performed by: PHYSICIAN ASSISTANT

## 2022-03-09 NOTE — PROGRESS NOTES
Subjective: Follow up today from second stage breast reconstruction with exchange of bilateral tissue expanders for permanent silicone breast implants with bilateral lateral upper chest wall tissue redundancy excision. Denies fever, nausea, vomiting, leg pain or swelling, pain is absent tothe right lateral mastectomy scar line. Objective:    Temp 96.4 °F (35.8 °C) (Temporal)   Breastfeeding No       Left Breast- Clean, dry, and intact, no drainage. no signs of infection. Scar Is well-healed      Right Breast Clean, dry, and intact, no drainage. no signs of infection.  Scar Is well-healed      Assessment:    Patient Active Problem List   Diagnosis    Acquired hypothyroidism    Malignant neoplasm of upper-outer quadrant of right breast in female, estrogen receptor negative ([facility]    Post-op pain     Pathology report:[facility] [address]            [address] [facility] [address] [facility] [address], [address], [address] [place] [address]               FINAL SURGICAL PATHOLOGY REPORT     NAME:           Evon Avitia             Date of       01/25/2022                                            Collection:   Medical Record   [id number]              Date of       01/26/2022   Number:                                  Receipt:   Age:  63 Y        Sex:  F                Date          01/28/2022 13:18                                            Reported:   Date Of Birth:   1965   Financial        [id number]             Admitting     MANDY COVARRUBIAS   Number:                                  Physician:   Patient          EDUARDO EARLY         Ordering      MANDY COVARRUBIAS   Location:                                Physician:     Accession Number:  [id number]                                          Additional Physicians:GRETCHEN FARRELL       Diagnosis:   A.  Right medial mastectomy scar, excision: Cicatrix with foreign body   giant cell reaction     B.  Right lateral mastectomy scar, excision: Cicatrix and fat necrosis     C.  Left mastectomy scar, excision: Cicatrix                                              BUDDY Lino                                   (Electronic Signature)     Plan:     Status post bilateral silicone breast reconstruction. I informed the patient that her incisions are well-healed she can begin scar massage at this time. No restrictions. Okay to return to work with no restrictions letter given to patient today. I discussed with the patient nipple reconstruction, tattooing and prosthetic nipple and areolas. She states at this time she is unsure how she would like to proceed informed her as she is not yet at the 3-month postop taz we will give her 2 to 3 months for allow for further settling and for the patient to decide on how she would like to proceed. I informed the patient that she does not need to have any nipple reconstruction at this time and she can do it in the future at any point time should she choose to do this at all. Patient voices understanding appreciation    Scar Care Instructions      Sunscreen Recommendations for Scars   We recommend that all patients use a sunscreen when outside but especially on new scars (that are exposed to sunlight) for a year after their procedure.  The SPF should be at least 28. Follow the application directions on the bottle. Why is it so Important to Use Sunscreen on Scars?  A scar is new and more fragile than the surrounding skin.  If you do not use sunscreen, the scar line will react differently to the sun than the surrounding skin.     If you don't use sunscreen, the scar tissue will become darker than surrounding skin. This is a hyper-pigmented scar and will remain darker than the other skin.  After one year, the scar and surrounding skin should react equally to sun. Superficial Scar Massage   Scar massage desensitizes and reduces scar adhesions so skin glides freely.  Rub in a circular motion on and around the scar with firm, even pressure for 5 minutes four times per day    You can start scar massage once incision is completely healed and strong enough to handle the motion (usually 10 - 14 days post operatively).  You use lotion to do the scar massage to allow ease with motion over the scar and prevent friction at the area. Patient had no further Questions. Paper instructions given to patient. Call office with concerns or signs of infection.       TISH Cuadra

## 2022-06-10 ENCOUNTER — OFFICE VISIT (OUTPATIENT)
Dept: BREAST CENTER | Age: 57
End: 2022-06-10
Payer: COMMERCIAL

## 2022-06-10 VITALS
DIASTOLIC BLOOD PRESSURE: 80 MMHG | HEIGHT: 63 IN | RESPIRATION RATE: 12 BRPM | BODY MASS INDEX: 23.92 KG/M2 | WEIGHT: 135 LBS | OXYGEN SATURATION: 98 % | SYSTOLIC BLOOD PRESSURE: 126 MMHG | HEART RATE: 89 BPM

## 2022-06-10 DIAGNOSIS — Z85.3 PERSONAL HISTORY OF BREAST CANCER: ICD-10-CM

## 2022-06-10 PROCEDURE — 99213 OFFICE O/P EST LOW 20 MIN: CPT

## 2022-06-10 PROCEDURE — 99213 OFFICE O/P EST LOW 20 MIN: CPT | Performed by: SURGERY

## 2022-06-10 ASSESSMENT — ENCOUNTER SYMPTOMS
ANAL BLEEDING: 0
COUGH: 0
RESPIRATORY NEGATIVE: 1
NAUSEA: 0
ALLERGIC/IMMUNOLOGIC NEGATIVE: 1
VOMITING: 0
EYES NEGATIVE: 1
CONSTIPATION: 0
SHORTNESS OF BREATH: 0
BACK PAIN: 0
GASTROINTESTINAL NEGATIVE: 1
ABDOMINAL DISTENTION: 0
ABDOMINAL PAIN: 0
BLOOD IN STOOL: 0
DIARRHEA: 0

## 2022-06-10 NOTE — PROGRESS NOTES
[facility]  PROGRESS NOTE  ATTENDING NOTE    Chief Complaint   Patient presents with    Breast Cancer     ESTABLISHED- 6 mo personal hx breast cancer-Right breast Mastectomy DOS 10/28/21 reconstruction with        S:  60y/o F presents for f/u of right breast cancer. She has undergone bilateral mastectomies with right SLNBx. She is doing well. She had some scar discomfort on the right, but with massage and vitamin E she is doing better. She has followed up with oncology recently. Review of Systems   Constitutional: Negative. Negative for activity change, appetite change and unexpected weight change. HENT: Negative. Eyes: Negative. Respiratory: Negative. Negative for cough and shortness of breath. Cardiovascular: Negative. Negative for chest pain and leg swelling. Gastrointestinal: Negative. Negative for abdominal distention, abdominal pain, anal bleeding, blood in stool, constipation, diarrhea, nausea and vomiting. Endocrine: Negative. Genitourinary: Negative. Musculoskeletal: Negative. Negative for arthralgias, back pain, gait problem, joint swelling and myalgias. Skin: Negative. Allergic/Immunologic: Negative. Neurological: Negative. Negative for dizziness, weakness and headaches. Hematological: Negative. Psychiatric/Behavioral: Negative. Negative for confusion, decreased concentration and sleep disturbance. /80 (Site: Left Upper Arm, Position: Sitting, Cuff Size: Medium Adult)   Pulse 89   Resp 12   Ht 5' 3\" (1.6 m)   Wt 135 lb (61.2 kg)   SpO2 98%   BMI 23.91 kg/m²   Physical Exam  Constitutional:       Appearance: Normal appearance. HENT:      Head: Normocephalic and atraumatic. Nose: Nose normal.      Mouth/Throat:      Mouth: Mucous membranes are moist.      Pharynx: Oropharynx is clear. Eyes:      Extraocular Movements: Extraocular movements intact.       Pupils: Pupils are equal, round, and reactive to light. Cardiovascular:      Rate and Rhythm: Normal rate and regular rhythm. Pulses: Normal pulses. Heart sounds: Normal heart sounds. Pulmonary:      Effort: Pulmonary effort is normal.      Breath sounds: Normal breath sounds. Chest:   Breasts:      Right: Absent. No axillary adenopathy or supraclavicular adenopathy. Left: Absent. No axillary adenopathy or supraclavicular adenopathy. Abdominal:      General: There is no distension. Palpations: Abdomen is soft. Tenderness: There is no abdominal tenderness. Musculoskeletal:         General: No tenderness or signs of injury. Cervical back: Normal range of motion and neck supple. Lymphadenopathy:      Cervical: No cervical adenopathy. Right cervical: No superficial cervical adenopathy. Left cervical: No superficial cervical adenopathy. Upper Body:      Right upper body: No supraclavicular or axillary adenopathy. Left upper body: No supraclavicular or axillary adenopathy. Skin:     General: Skin is warm and dry. Neurological:      General: No focal deficit present. Mental Status: She is alert and oriented to person, place, and time. Psychiatric:         Mood and Affect: Mood normal.         Behavior: Behavior normal.         Thought Content: Thought content normal.         Judgment: Judgment normal.       ASSESSMENT/PLAN:  Right breast cancer--TNBC, s/p bilateral mastectomy, right SLNBx  --continue monthly self exam/skin exam and bring any changes to our attention  --continue to f/u with oncology  --maintain a healthy weight with diet and exercise.     RTC 6 months    Juanito Tiwari MD, MSc, FACS  6/10/2022  12:13 PM

## 2022-06-13 ENCOUNTER — OFFICE VISIT (OUTPATIENT)
Dept: SURGERY | Age: 57
End: 2022-06-13
Payer: COMMERCIAL

## 2022-06-13 VITALS — TEMPERATURE: 98 F

## 2022-06-13 DIAGNOSIS — Z98.890 S/P BREAST RECONSTRUCTION: Primary | ICD-10-CM

## 2022-06-13 PROCEDURE — 99212 OFFICE O/P EST SF 10 MIN: CPT | Performed by: PHYSICIAN ASSISTANT

## 2022-06-13 NOTE — PROGRESS NOTES
Subjective: Follow up today from second stage breast reconstruction with exchange of bilateral tissue expanders for permanent silicone breast implants with bilateral lateral upper chest wall tissue redundancy excision. Denies fever, nausea, vomiting, leg pain or swelling, pain is absent tothe right lateral mastectomy scar line. She is now [place] 3 months postop since her second stage breast reconstruction presents today for continued evaluation for possible nipple reconstruction and areolar tattooing. Objective:    Temp 98 °F (36.7 °C) (Temporal)   Breastfeeding No       Left Breast- Clean, dry, and intact, no drainage. no signs of infection. Scar Is well-healed no fluid wave on palpation breast implant are symmetrical      Right Breast Clean, dry, and intact, no drainage. no signs of infection.  Scar Is well-healed no fluid wave on palpation breast implant are symmetrical      Assessment:    Patient Active Problem List   Diagnosis    Acquired hypothyroidism    Malignant neoplasm of upper-outer quadrant of right breast in female, estrogen receptor negative ([facility] [address]    Post-op pain     Pathology report:[facility] [address] [address]            [address] [place] [address] Yessi Conteh [address], [address], [address] [place] [address]               FINAL SURGICAL PATHOLOGY REPORT     NAME:           Evon Benoit             Date of       01/25/2022                                            Collection:   Medical Record   [id number]              Date of       01/26/2022   Number:                                  Receipt:   Age:  63 Y        Sex:  F                Date          01/28/2022 13:18                                            Reported:   Date Of Birth:   1965   Financial        [id number]             Admitting     MANDY COVARRUBIAS   Number:                                  Physician:   Patient          EDUARDO EARLY         Ordering      MANDY COVARRUBIAS   Location:                                Physician:     Accession Number:  [id number]                                          Additional Physicians:GRETCHEN FARRELL       Diagnosis:   A.  Right medial mastectomy scar, excision: Cicatrix with foreign body   giant cell reaction     B.  Right lateral mastectomy scar, excision: Cicatrix and fat necrosis     C.  Left mastectomy scar, excision: Cicatrix                                              BUDDY Pina                                   (Electronic Signature)     Plan:     Status post bilateral silicone breast reconstruction. I informed the patient that her incisions are well-healed she can continue scar massage at this time. No restrictions. Scar Care Instructions      Sunscreen Recommendations for Scars   We recommend that all patients use a sunscreen when outside but especially on new scars (that are exposed to sunlight) for a year after their procedure.  The SPF should be at least 28. Follow the application directions on the bottle. Why is it so Important to Use Sunscreen on Scars?  A scar is new and more fragile than the surrounding skin.  If you do not use sunscreen, the scar line will react differently to the sun than the surrounding skin.  If you don't use sunscreen, the scar tissue will become darker than surrounding skin. This is a hyper-pigmented scar and will remain darker than the other skin.  After one year, the scar and surrounding skin should react equally to sun. Superficial Scar Massage   Scar massage desensitizes and reduces scar adhesions so skin glides freely.     Rub in a circular motion on and around the scar with firm, even pressure for 5 minutes four times per day    You can start scar massage once incision is completely healed and strong enough to handle the motion (usually 10 - 14 days post operatively).  You use lotion to do the scar massage to allow ease with motion over the scar and prevent friction at the area. Patient had no further Questions. Paper instructions given to patient. Call office with concerns or signs of infection. After discussing the options on nipple reconstruction the patient states she does not want a projected nipple and would like to proceed with tattooing only at this time. Areola Tattooing Discussion    Patient voices desire for tattooing to create a pigmented areola following nipple reconstruction. She understands that she may do this under local or under sedation. She understands that there is a risk of infection, bleeding, pigment irregularity, need for further procedures including those related to implant infection, loss and need for touch-up procedures. She will have some decision making in the hue of the pigment of the areola, but there are no guarantees of exact coloring. She understands that tattooing will never appear like a native areola. The risks, benefits and options were discussed with the pt. The risks included but not limited to pain, bleeding, infection, heavy scarring, neurovascular injury, damage to surrounding structures, fluid collections, asymmetry, wound healing complications requiring dressing changes, heart attack, stroke, DVT, PE, contour irregularities, and need for further procedures. No guarantees were given. All of Her questions were answered to Her satisfaction and She agrees to proceed with the operation. She chooses to do this in the office under local anesthesia. She understands she will need a minimum of 2 tattoo treatments to obtain optimal pigmentation.       TISH Euceda

## 2022-07-05 ENCOUNTER — OFFICE VISIT (OUTPATIENT)
Dept: PRIMARY CARE CLINIC | Age: 57
End: 2022-07-05
Payer: COMMERCIAL

## 2022-07-05 VITALS
BODY MASS INDEX: 23.92 KG/M2 | WEIGHT: 135 LBS | SYSTOLIC BLOOD PRESSURE: 128 MMHG | OXYGEN SATURATION: 96 % | DIASTOLIC BLOOD PRESSURE: 68 MMHG | TEMPERATURE: 98.2 F | HEART RATE: 78 BPM | HEIGHT: 63 IN

## 2022-07-05 DIAGNOSIS — E03.9 ACQUIRED HYPOTHYROIDISM: Primary | ICD-10-CM

## 2022-07-05 DIAGNOSIS — C50.411 MALIGNANT NEOPLASM OF UPPER-OUTER QUADRANT OF RIGHT BREAST IN FEMALE, ESTROGEN RECEPTOR NEGATIVE (HCC): ICD-10-CM

## 2022-07-05 DIAGNOSIS — Z17.1 MALIGNANT NEOPLASM OF UPPER-OUTER QUADRANT OF RIGHT BREAST IN FEMALE, ESTROGEN RECEPTOR NEGATIVE (HCC): ICD-10-CM

## 2022-07-05 DIAGNOSIS — E55.9 VITAMIN D INSUFFICIENCY: ICD-10-CM

## 2022-07-05 DIAGNOSIS — R73.01 IMPAIRED FASTING GLUCOSE: ICD-10-CM

## 2022-07-05 DIAGNOSIS — Z13.6 SCREENING FOR CARDIOVASCULAR CONDITION: ICD-10-CM

## 2022-07-05 PROCEDURE — 99214 OFFICE O/P EST MOD 30 MIN: CPT | Performed by: FAMILY MEDICINE

## 2022-07-05 RX ORDER — AZELASTINE 1 MG/ML
1 SPRAY, METERED NASAL 2 TIMES DAILY
Qty: 1 EACH | Refills: 1 | Status: SHIPPED | OUTPATIENT
Start: 2022-07-05

## 2022-07-05 RX ORDER — LEVOTHYROXINE SODIUM 0.05 MG/1
50 TABLET ORAL DAILY
Qty: 90 TABLET | Refills: 3 | Status: SHIPPED | OUTPATIENT
Start: 2022-07-05

## 2022-07-05 ASSESSMENT — PATIENT HEALTH QUESTIONNAIRE - PHQ9
SUM OF ALL RESPONSES TO PHQ9 QUESTIONS 1 & 2: 0
SUM OF ALL RESPONSES TO PHQ QUESTIONS 1-9: 0
3. TROUBLE FALLING OR STAYING ASLEEP: 0
10. IF YOU CHECKED OFF ANY PROBLEMS, HOW DIFFICULT HAVE THESE PROBLEMS MADE IT FOR YOU TO DO YOUR WORK, TAKE CARE OF THINGS AT HOME, OR GET ALONG WITH OTHER PEOPLE: 0
8. MOVING OR SPEAKING SO SLOWLY THAT OTHER PEOPLE COULD HAVE NOTICED. OR THE OPPOSITE, BEING SO FIGETY OR RESTLESS THAT YOU HAVE BEEN MOVING AROUND A LOT MORE THAN USUAL: 0
6. FEELING BAD ABOUT YOURSELF - OR THAT YOU ARE A FAILURE OR HAVE LET YOURSELF OR YOUR FAMILY DOWN: 0
SUM OF ALL RESPONSES TO PHQ QUESTIONS 1-9: 0
9. THOUGHTS THAT YOU WOULD BE BETTER OFF DEAD, OR OF HURTING YOURSELF: 0
2. FEELING DOWN, DEPRESSED OR HOPELESS: 0
SUM OF ALL RESPONSES TO PHQ QUESTIONS 1-9: 0
5. POOR APPETITE OR OVEREATING: 0
4. FEELING TIRED OR HAVING LITTLE ENERGY: 0
1. LITTLE INTEREST OR PLEASURE IN DOING THINGS: 0
7. TROUBLE CONCENTRATING ON THINGS, SUCH AS READING THE NEWSPAPER OR WATCHING TELEVISION: 0
SUM OF ALL RESPONSES TO PHQ QUESTIONS 1-9: 0

## 2022-07-05 ASSESSMENT — ENCOUNTER SYMPTOMS
CONSTIPATION: 0
DIARRHEA: 0
ABDOMINAL PAIN: 0
VOMITING: 0
BACK PAIN: 0
SHORTNESS OF BREATH: 0
COUGH: 0
NAUSEA: 0
WHEEZING: 0

## 2022-07-06 RX ORDER — LIDOCAINE HYDROCHLORIDE AND EPINEPHRINE 10; 10 MG/ML; UG/ML
3 INJECTION, SOLUTION INFILTRATION; PERINEURAL ONCE
Status: CANCELLED | OUTPATIENT
Start: 2022-07-06 | End: 2022-07-06

## 2022-07-06 NOTE — PROGRESS NOTES
Nipple and Areolar Tattooing Procedure Note    Risk benefits and alternatives to procedure were explained to the patient including bleeding, scar, infection, pain, pigment irregularities requiring touch-up procedures. The patient understands that the tattoo will neve match her native NAC. There is a possibility of infection to her breast/ implant. The patient was  involved in the incision making process regarding the pigment of the nipple and areola as well as the nipple and areola circumference. After final determination was made regarding the patient's areola diameter as well as the pigmentation color choices the patient stood in front of a mirro and voiced agreement with the placement of the nipple and areola on the breast with the markings that were used. The procedure was carried out. The patient deferred any local anesthesia to her procedure today and she states she does not have much sensation to her bilateral breast reconstruction. Left breast: The patient has not had nipple reconstruction. The procedure performed was a 3-dimensional areola tattooing. The contralateral NAC was not present and used as a reference. Areola Diameter = 42mm  Areola Area= 13.9cm2          (Areola 2A) 10 gtts - Areola pigment  (Areola 6A) 5 gtts - nipple pigment    (Areola 7) 4 gtts - Santiago gland and areola rim pigment  (White) 2 gtts - Santiago gland highlight pigment    The procedure was carried out under sterile conditions using a series of tattoo needles for outlining and filling. There was pinpoint dermal bleeding with transfer of pigment noted. Right breast:  The patient has not had nipple reconstruction. The procedure performed was a 3-dimensional areola tattooing. The contralateral NAC was not present and used as a reference.     Areola Diameter = 42mm  Areola Area= 13.9cm2          (Areola 2A) 10 gtts - Areola pigment  (Areola 6A) 5 gtts - nipple pigment    (Areola 7) 4 gtts - Santiago gland and areola rim pigment  (White) 2 gtts - Santiago gland highlight pigment      The procedure was carried out under sterile conditions using a series of tattoo needles for outlining and filling. There was pinpoint dermal bleeding with transfer of pigment noted. The visit lasted greater than 60 minutes, with  time in counseling, education, review of the case, procedure and coordination of care. Bacitracin and bandage applied. Post procedural Instructions reviewed. F/U 2 weeks.     TISH Sheffield

## 2022-07-06 NOTE — PROGRESS NOTES
22  Malcolm Rice : 1965 Sex: female  Age: 62 y.o. Chief Complaint   Patient presents with    Follow-up    Medication Refill     HPI:  62 y.o. female patient presents today for 6 month follow up of chronic medical conditions, medication refills and FBW. Patient's chart, medical, surgical and medication history all reviewed. Hypothyroidism  Patient presents for routine follow up of Hypothyroidism. Current symptoms: none. Patient denies change in energy level, diarrhea, heat / cold intolerance, nervousness, palpitations and weight changes. Symptoms have been well-controlled. No difficulty swallowing or masses felt. Last TSH was 1.860. Patient is currently taking levothyroxine 50 mcg. Breast Cancer  Patient diagnosed with invasive ductal carcinoma of the R breast in May 2021. Treated with chemo and radiation. Underwent bilateral mastectomy with reconstruction in October. Doing well overall today. Will be getting nipple tattooing this week. ROS:  Review of Systems   Constitutional: Negative for chills, fatigue and fever. Respiratory: Negative for cough, shortness of breath and wheezing. Cardiovascular: Negative for chest pain and palpitations. Gastrointestinal: Negative for abdominal pain, constipation, diarrhea, nausea and vomiting. Musculoskeletal: Negative for arthralgias and back pain. Skin: Negative for rash. Neurological: Negative for dizziness and headaches. Psychiatric/Behavioral: Negative for dysphoric mood. The patient is not nervous/anxious. All other systems reviewed and are negative.        Current Outpatient Medications on File Prior to Visit   Medication Sig Dispense Refill    VITAMIN E PO Take by mouth      Vitamin D (CHOLECALCIFEROL) 25 MCG (1000 UT) TABS tablet Take 1,000 Units by mouth daily      Ascorbic Acid (VITAMIN C) 250 MG tablet Take 500 mg by mouth daily      loratadine (CLARITIN) 10 MG tablet Take 10 mg by mouth daily      APPLE CIDER VINEGAR PO Take by mouth      FIBER PO Take by mouth      vitamin B-12 (CYANOCOBALAMIN) 100 MCG tablet Take 50 mcg by mouth daily       No current facility-administered medications on file prior to visit.        Allergies   Allergen Reactions    Oxycodone-Acetaminophen Nausea And Vomiting       Past Medical History:   Diagnosis Date    Anxiety     Cancer ([address] 05/2021    breast     Depression     History of cancer chemotherapy 6/2021-8/30/2021    Hypothyroidism      Past Surgical History:   Procedure Laterality Date    BREAST BIOPSY Left     BREAST RECONSTRUCTION Bilateral 10/28/2021    LEFT PROPHYLACTIC MASTECTOMY WITH BILTATER FIRST STAGE RECONSTRUCTION USING TISSUE EXPANDER AND ALLODERM performed by Andreas Resendez MD at [place] [address] Bilateral 1/25/2022    SECOND STAGE BREAST RECONSTRUCTION WITH EXCHANGE OF TISSUE EXPANDERS FOR PERMENANT SILICON BREAST IMPLANTS performed by Andreas Resendez MD at [address] [facility] Right 10/28/2021    RIGHT BREAST MASTECTOMY WITH SENTINEL LYMPH NODE BIOPSY POSSIBLE AXILLARY LYMPH NODE DISSECTION - [place] performed by Sivakumar Weiss MD at [address] [facility] (CERVIX NOT REMOVED) Left     Left ovary gone due to endometriosis - has cervix and right ovary    PORT SURGERY Left 6/21/2021    MEDI PORT INSERTION LEFT SIDE PLACEMENT performed by Sivakumar Weiss MD at [address] [place] [address]. Right 5/6/2021    US BREAST NEEDLE BIOPSY RIGHT 5/6/2021 [facility]     Family History   Problem Relation Age of Onset    No Known Problems Father     Cervical Cancer Sister     Uterine Cancer Sister         unsure    No Known Problems Brother     Cancer Paternal Aunt      Social History     Socioeconomic History    Marital status:      Spouse name: Not on file    Number of children: Not on file    Years of education: Not on file    Highest education level: Not on file   Occupational History    Not on file   Tobacco Use    Smoking status: Former Smoker     Packs/day: 1.00     Years: 10.00     Pack years: 10.00     Types: Cigarettes     Quit date: 2004     Years since quittin.5    Smokeless tobacco: Never Used   Vaping Use    Vaping Use: Never used   Substance and Sexual Activity    Alcohol use: Not Currently    Drug use: Never    Sexual activity: Not Currently   Other Topics Concern    Not on file   Social History Narrative    Not on file     Social Determinants of Health     Financial Resource Strain: Low Risk     Difficulty of Paying Living Expenses: Not hard at all   Food Insecurity: No Food Insecurity    Worried About [facility] in the Last Year: Never true    [facility] in the Last Year: Never true   Transportation Needs:     Lack of Transportation (Medical): Not on file    Lack of Transportation (Non-Medical):  Not on file   Physical Activity:     Days of Exercise per Week: Not on file    Minutes of Exercise per Session: Not on file   Stress:     Feeling of Stress : Not on file   Social Connections:     Frequency of Communication with Friends and Family: Not on file    Frequency of Social Gatherings with Friends and Family: Not on file    Attends Oriental orthodox Services: Not on file    Active Member of [address] [place] [address] or Organizations: Not on file    Attends Club or Organization Meetings: Not on file    Marital Status: Not on file   Intimate Partner Violence:     Fear of Current or Ex-Partner: Not on file    Emotionally Abused: Not on file    Physically Abused: Not on file    Sexually Abused: Not on file   Housing Stability:     Unable to Pay for Housing in the Last Year: Not on file    Number of [place] in the Last Year: Not on file    Unstable Housing in the Last Year: Not on file       Vitals:    22 1134   BP: 128/68   Pulse: 78   Temp: 98.2 °F (36.8 °C)   SpO2: 96%   Weight: 135 lb (61.2 kg)   Height: 5' 3\" (1.6 m)       Physical Exam:  Physical Exam  Vitals and nursing note reviewed. Constitutional:       General: She is not in acute distress. Appearance: Normal appearance. She is well-developed and normal weight. She is not ill-appearing. HENT:      Head: Normocephalic and atraumatic. Right Ear: Hearing and external ear normal.      Left Ear: Hearing and external ear normal.      Nose:      Comments: Wearing mask  Eyes:      General: Lids are normal. No scleral icterus. Extraocular Movements: Extraocular movements intact. Conjunctiva/sclera: Conjunctivae normal.   Neck:      Thyroid: No thyromegaly. Vascular: No carotid bruit. Cardiovascular:      Rate and Rhythm: Normal rate and regular rhythm. Heart sounds: Normal heart sounds. No murmur heard. Pulmonary:      Effort: Pulmonary effort is normal. No respiratory distress. Breath sounds: Normal breath sounds. No wheezing. Musculoskeletal:         General: No tenderness or deformity. Normal range of motion. Cervical back: Normal range of motion and neck supple. Right lower leg: No edema. Left lower leg: No edema. Lymphadenopathy:      Cervical: No cervical adenopathy. Skin:     General: Skin is warm and dry. Findings: No rash. Neurological:      General: No focal deficit present. Mental Status: She is alert and oriented to person, place, and time. Gait: Gait normal.   Psychiatric:         Mood and Affect: Mood and affect normal.         Speech: Speech normal.         Behavior: Behavior normal.         Thought Content:  Thought content normal.         Labs:  CBC with Differential:    Lab Results   Component Value Date/Time    WBC 7.0 01/14/2022 10:26 AM    RBC 4.55 01/14/2022 10:26 AM    HGB 12.5 01/14/2022 10:26 AM    HCT 40.1 01/14/2022 10:26 AM     01/14/2022 10:26 AM    MCV 88.1 01/14/2022 10:26 AM    MCH 27.5 01/14/2022 10:26 AM    MCHC 31.2 01/14/2022 10:26 AM    RDW 14.6 01/14/2022 10:26 AM    METASPCT 0.9 08/13/2021 11:25 AM Feeling well overall. Impaired fasting glucose  -     Hemoglobin A1C; Future    Screening for cardiovascular condition  -     Lipid Panel; Future    Vitamin D insufficiency  -     Vitamin D 25 Hydroxy; Future    Other orders  -     azelastine (ASTELIN) 0.1 % nasal spray; 1 spray by Nasal route 2 times daily Use in each nostril as directed          Return in about 6 months (around 1/5/2023), or if symptoms worsen or fail to improve, for Well visit.       Seen By:  Christiane Boland DO

## 2022-07-07 ENCOUNTER — PROCEDURE VISIT (OUTPATIENT)
Dept: SURGERY | Age: 57
End: 2022-07-07
Payer: COMMERCIAL

## 2022-07-07 VITALS
HEART RATE: 108 BPM | HEIGHT: 63 IN | BODY MASS INDEX: 23.91 KG/M2 | SYSTOLIC BLOOD PRESSURE: 130 MMHG | RESPIRATION RATE: 24 BRPM | DIASTOLIC BLOOD PRESSURE: 72 MMHG | TEMPERATURE: 97.6 F | OXYGEN SATURATION: 98 %

## 2022-07-07 DIAGNOSIS — Z98.890 S/P BREAST RECONSTRUCTION: Primary | ICD-10-CM

## 2022-07-07 DIAGNOSIS — Z90.13 ACQUIRED ABSENCE OF BOTH NIPPLES: ICD-10-CM

## 2022-07-07 PROCEDURE — 11922 CORRECT SKIN COLOR EA 20.0CM: CPT | Performed by: PHYSICIAN ASSISTANT

## 2022-07-07 PROCEDURE — 11921 CORRECT SKN COLOR 6.1-20.0CM: CPT | Performed by: PHYSICIAN ASSISTANT

## 2022-07-10 ENCOUNTER — APPOINTMENT (OUTPATIENT)
Dept: CT IMAGING | Age: 57
End: 2022-07-10
Payer: COMMERCIAL

## 2022-07-10 ENCOUNTER — HOSPITAL ENCOUNTER (EMERGENCY)
Age: 57
Discharge: HOME | End: 2022-07-10
Attending: EMERGENCY MEDICINE
Payer: COMMERCIAL

## 2022-07-10 VITALS
WEIGHT: 135 LBS | BODY MASS INDEX: 23.92 KG/M2 | DIASTOLIC BLOOD PRESSURE: 84 MMHG | HEIGHT: 63 IN | RESPIRATION RATE: 14 BRPM | SYSTOLIC BLOOD PRESSURE: 130 MMHG | TEMPERATURE: 97.5 F | OXYGEN SATURATION: 99 % | HEART RATE: 73 BPM

## 2022-07-10 DIAGNOSIS — R31.0 GROSS HEMATURIA: ICD-10-CM

## 2022-07-10 DIAGNOSIS — N20.1 URETEROLITHIASIS: Primary | ICD-10-CM

## 2022-07-10 LAB
ANION GAP SERPL CALCULATED.3IONS-SCNC: 11 MMOL/L (ref 7–16)
BACTERIA: ABNORMAL /HPF
BILIRUBIN URINE: NEGATIVE
BLOOD, URINE: ABNORMAL
BUN BLDV-MCNC: 22 MG/DL (ref 6–20)
CALCIUM SERPL-MCNC: 9.8 MG/DL (ref 8.6–10.2)
CHLORIDE BLD-SCNC: 105 MMOL/L (ref 98–107)
CLARITY: CLEAR
CO2: 24 MMOL/L (ref 22–29)
COLOR: YELLOW
CREAT SERPL-MCNC: 0.9 MG/DL (ref 0.5–1)
EPITHELIAL CELLS, UA: ABNORMAL /HPF
GFR AFRICAN AMERICAN: >60
GFR NON-AFRICAN AMERICAN: >60 ML/MIN/1.73
GLUCOSE BLD-MCNC: 107 MG/DL (ref 74–99)
GLUCOSE URINE: NEGATIVE MG/DL
HCT VFR BLD CALC: 38 % (ref 34–48)
HEMOGLOBIN: 12.3 G/DL (ref 11.5–15.5)
HYALINE CASTS: ABNORMAL /LPF (ref 0–2)
KETONES, URINE: NEGATIVE MG/DL
LACTIC ACID: 1.6 MMOL/L (ref 0.5–2.2)
LEUKOCYTE ESTERASE, URINE: NEGATIVE
MCH RBC QN AUTO: 28.5 PG (ref 26–35)
MCHC RBC AUTO-ENTMCNC: 32.4 % (ref 32–34.5)
MCV RBC AUTO: 88.2 FL (ref 80–99.9)
MUCUS: PRESENT /LPF
NITRITE, URINE: NEGATIVE
PDW BLD-RTO: 13.7 FL (ref 11.5–15)
PH UA: 7 (ref 5–9)
PLATELET # BLD: 280 E9/L (ref 130–450)
PMV BLD AUTO: 8.5 FL (ref 7–12)
POTASSIUM SERPL-SCNC: 4.3 MMOL/L (ref 3.5–5)
PROTEIN UA: NEGATIVE MG/DL
RBC # BLD: 4.31 E12/L (ref 3.5–5.5)
RBC UA: >20 /HPF (ref 0–2)
SODIUM BLD-SCNC: 140 MMOL/L (ref 132–146)
SPECIFIC GRAVITY UA: 1.02 (ref 1–1.03)
UROBILINOGEN, URINE: 0.2 E.U./DL
WBC # BLD: 14.5 E9/L (ref 4.5–11.5)
WBC UA: ABNORMAL /HPF (ref 0–5)

## 2022-07-10 PROCEDURE — 96374 THER/PROPH/DIAG INJ IV PUSH: CPT

## 2022-07-10 PROCEDURE — 83605 ASSAY OF LACTIC ACID: CPT

## 2022-07-10 PROCEDURE — 99284 EMERGENCY DEPT VISIT MOD MDM: CPT

## 2022-07-10 PROCEDURE — 81001 URINALYSIS AUTO W/SCOPE: CPT

## 2022-07-10 PROCEDURE — 96375 TX/PRO/DX INJ NEW DRUG ADDON: CPT

## 2022-07-10 PROCEDURE — 85027 COMPLETE CBC AUTOMATED: CPT

## 2022-07-10 PROCEDURE — 74176 CT ABD & PELVIS W/O CONTRAST: CPT

## 2022-07-10 PROCEDURE — 80048 BASIC METABOLIC PNL TOTAL CA: CPT

## 2022-07-10 PROCEDURE — 2580000003 HC RX 258: Performed by: EMERGENCY MEDICINE

## 2022-07-10 PROCEDURE — 87088 URINE BACTERIA CULTURE: CPT

## 2022-07-10 PROCEDURE — 36415 COLL VENOUS BLD VENIPUNCTURE: CPT

## 2022-07-10 PROCEDURE — 6360000002 HC RX W HCPCS: Performed by: EMERGENCY MEDICINE

## 2022-07-10 PROCEDURE — 6370000000 HC RX 637 (ALT 250 FOR IP): Performed by: EMERGENCY MEDICINE

## 2022-07-10 RX ORDER — OXYCODONE HYDROCHLORIDE AND ACETAMINOPHEN 5; 325 MG/1; MG/1
1 TABLET ORAL EVERY 6 HOURS PRN
Qty: 10 TABLET | Refills: 0 | Status: SHIPPED | OUTPATIENT
Start: 2022-07-10 | End: 2022-07-13

## 2022-07-10 RX ORDER — TAMSULOSIN HYDROCHLORIDE 0.4 MG/1
0.4 CAPSULE ORAL DAILY
Status: DISCONTINUED | OUTPATIENT
Start: 2022-07-10 | End: 2022-07-10 | Stop reason: HOSPADM

## 2022-07-10 RX ORDER — TAMSULOSIN HYDROCHLORIDE 0.4 MG/1
0.4 CAPSULE ORAL DAILY
Qty: 14 CAPSULE | Refills: 0 | Status: SHIPPED | OUTPATIENT
Start: 2022-07-10 | End: 2022-07-24

## 2022-07-10 RX ORDER — MORPHINE SULFATE 4 MG/ML
4 INJECTION, SOLUTION INTRAMUSCULAR; INTRAVENOUS ONCE
Status: DISCONTINUED | OUTPATIENT
Start: 2022-07-10 | End: 2022-07-10

## 2022-07-10 RX ORDER — ONDANSETRON 4 MG/1
4 TABLET, ORALLY DISINTEGRATING ORAL EVERY 8 HOURS PRN
Qty: 10 TABLET | Refills: 0 | Status: SHIPPED | OUTPATIENT
Start: 2022-07-10

## 2022-07-10 RX ORDER — ONDANSETRON 2 MG/ML
4 INJECTION INTRAMUSCULAR; INTRAVENOUS ONCE
Status: COMPLETED | OUTPATIENT
Start: 2022-07-10 | End: 2022-07-10

## 2022-07-10 RX ORDER — 0.9 % SODIUM CHLORIDE 0.9 %
1000 INTRAVENOUS SOLUTION INTRAVENOUS ONCE
Status: COMPLETED | OUTPATIENT
Start: 2022-07-10 | End: 2022-07-10

## 2022-07-10 RX ORDER — KETOROLAC TROMETHAMINE 30 MG/ML
15 INJECTION, SOLUTION INTRAMUSCULAR; INTRAVENOUS ONCE
Status: COMPLETED | OUTPATIENT
Start: 2022-07-10 | End: 2022-07-10

## 2022-07-10 RX ADMIN — SODIUM CHLORIDE 1000 ML: 9 INJECTION, SOLUTION INTRAVENOUS at 11:36

## 2022-07-10 RX ADMIN — TAMSULOSIN HYDROCHLORIDE 0.4 MG: 0.4 CAPSULE ORAL at 11:35

## 2022-07-10 RX ADMIN — KETOROLAC TROMETHAMINE 15 MG: 30 INJECTION, SOLUTION INTRAMUSCULAR; INTRAVENOUS at 12:56

## 2022-07-10 RX ADMIN — ONDANSETRON 4 MG: 2 INJECTION INTRAMUSCULAR; INTRAVENOUS at 12:55

## 2022-07-10 ASSESSMENT — PAIN - FUNCTIONAL ASSESSMENT: PAIN_FUNCTIONAL_ASSESSMENT: NONE - DENIES PAIN

## 2022-07-10 ASSESSMENT — PAIN SCALES - GENERAL: PAINLEVEL_OUTOF10: 0

## 2022-07-10 NOTE — ED PROVIDER NOTES
HPI:  7/10/22,   Time: 9:53 AM CATA Weir is a 62 y.o. female presenting to the ED for LEFT flank pain NV, beginning several hours ago. The complaint has been intermittent, rates it as a 8 out of 10., moderate in severity, and worsened by nothing. Associated with dysuria. She had no hematuria. Patient nausea vomiting gastric contents x3 this morning. Patient had history of kidney stones in the past.  No fevers reported. No close contacts ill. She has no chest pain or shortness of breath. States the pain does come and go. No melena hematochezia or diarrhea reported. ROS:   Pertinent positives and negatives are stated within HPI, all other systems reviewed and are negative.  --------------------------------------------- PAST HISTORY ---------------------------------------------  Past Medical History:  has a past medical history of Anxiety, Cancer ([facility] [address] Depression, History of cancer chemotherapy, and Hypothyroidism. Past Surgical History:  has a past surgical history that includes Breast biopsy (Left); Partial hysterectomy (Left); US BREAST BIOPSY W LOC DEVICE 1ST LESION RIGHT (Right, 5/6/2021); Port Surgery (Left, 6/21/2021); Breast reconstruction (Bilateral, 10/28/2021); Mastectomy (Right, 10/28/2021); and Breast reconstruction (Bilateral, 1/25/2022). Social History:  reports that she quit smoking about 18 years ago. Her smoking use included cigarettes. She has a 10.00 pack-year smoking history. She has never used smokeless tobacco. She reports previous alcohol use. She reports that she does not use drugs. Family History: family history includes Cancer in her paternal aunt; Cervical Cancer in her sister; No Known Problems in her brother and father; Uterine Cancer in her sister. The patients home medications have been reviewed.     Allergies: Oxycodone-acetaminophen    -------------------------------------------------- RESULTS -------------------------------------------------  All laboratory and radiology results have been personally reviewed by myself   LABS:  Results for orders placed or performed during the hospital encounter of 07/10/22   CBC   Result Value Ref Range    WBC 14.5 (H) 4.5 - 11.5 E9/L    RBC 4.31 3.50 - 5.50 E12/L    Hemoglobin 12.3 11.5 - 15.5 g/dL    Hematocrit 38.0 34.0 - 48.0 %    MCV 88.2 80.0 - 99.9 fL    MCH 28.5 26.0 - 35.0 pg    MCHC 32.4 32.0 - 34.5 %    RDW 13.7 11.5 - 15.0 fL    Platelets 811 307 - 157 E9/L    MPV 8.5 7.0 - 12.0 fL   Basic Metabolic Panel   Result Value Ref Range    Sodium 140 132 - 146 mmol/L    Potassium 4.3 3.5 - 5.0 mmol/L    Chloride 105 98 - 107 mmol/L    CO2 24 22 - 29 mmol/L    Anion Gap 11 7 - 16 mmol/L    Glucose 107 (H) 74 - 99 mg/dL    BUN 22 (H) 6 - 20 mg/dL    CREATININE 0.9 0.5 - 1.0 mg/dL    GFR Non-African American >60 >=60 mL/min/1.73    GFR African American >60     Calcium 9.8 8.6 - 10.2 mg/dL   Lactic Acid   Result Value Ref Range    Lactic Acid 1.6 0.5 - 2.2 mmol/L   Urinalysis   Result Value Ref Range    Color, UA Yellow Straw/Yellow    Clarity, UA Clear Clear    Glucose, Ur Negative Negative mg/dL    Bilirubin Urine Negative Negative    Ketones, Urine Negative Negative mg/dL    Specific Gravity, UA 1.020 1.005 - 1.030    Blood, Urine LARGE (A) Negative    pH, UA 7.0 5.0 - 9.0    Protein, UA Negative Negative mg/dL    Urobilinogen, Urine 0.2 <2.0 E.U./dL    Nitrite, Urine Negative Negative    Leukocyte Esterase, Urine Negative Negative       RADIOLOGY:  Interpreted by Radiologist.  CT ABDOMEN PELVIS WO CONTRAST Additional Contrast? None   Final Result   4 mm moderately obstructing stone seen at the left UVJ.   Dilatation seen of   the left renal collecting system and ureter with perinephric stranding as   well seen on the left.             ------------------------- NURSING NOTES AND VITALS REVIEWED ---------------------------   The nursing notes within the ED encounter and vital signs as below have been reviewed. /84   Pulse 73   Temp 97.5 °F (36.4 °C) (Oral)   Resp 14   Ht 5' 3\" (1.6 m)   Wt 135 lb (61.2 kg)   SpO2 99%   BMI 23.91 kg/m²   Oxygen Saturation Interpretation: Normal      ---------------------------------------------------PHYSICAL EXAM--------------------------------------      Constitutional/General: Alert and oriented x3, well appearing, non toxic in NAD  Head: NC/AT  Eyes: PERRL, EOMI  Mouth: Oropharynx clear, handling secretions, no trismus  Neck: Supple, full ROM, no meningeal signs  Pulmonary: Lungs clear to auscultation bilaterally, no wheezes, rales, or rhonchi. Not in respiratory distress  Cardiovascular:  Regular rate and rhythm, no murmurs, gallops, or rubs. 2+ distal pulses  Abdomen: Soft, non tender, non distended, no CVA tenderness. No rebound tenderness or guarding she has no pulsatile masses. Extremities: Moves all extremities x 4. Warm and well perfused  Skin: warm and dry without rash  Neurologic: GCS 15, no focal deficits   Psych: Normal Affect      ------------------------------ ED COURSE/MEDICAL DECISION MAKING----------------------  Medications   ondansetron (ZOFRAN) injection 4 mg (has no administration in time range)   morphine sulfate (PF) injection 4 mg (has no administration in time range)   tamsulosin (FLOMAX) capsule 0.4 mg (0.4 mg Oral Given 7/10/22 0773)   0.9 % sodium chloride bolus (1,000 mLs IntraVENous New Bag 7/10/22 8712)         Medical Decision Making:    Presents with left flank pain dysuria. Concern for UTI versus Kulwinder versus kidney stone. IV fluids pain control, antiemetics. Labs UA CT without contrast.    All diagnostics were interpreted reviewed by me. Patient is 1/4 mm stone left UVJ. Some mild stranding of the left renal complex. Patient's labs demonstrate a leukocytosis no acute renal injury, UA was negative for infectious process. Patient was given IV fluids morphine and Zofran and Flomax.     On reexamination patient states he is feeling better with above medicines. Counseling: The emergency provider has spoken with the patient and spouse/SO and discussed todays results, in addition to providing specific details for the plan of care and counseling regarding the diagnosis and prognosis. Questions are answered at this time and they are agreeable with the plan.      --------------------------------- IMPRESSION AND DISPOSITION ---------------------------------    IMPRESSION  1. Ureterolithiasis    2.  Gross hematuria        DISPOSITION  Disposition: Discharge to home  Patient condition is stable                Sana Vasquez DO  07/10/22 6608

## 2022-07-12 LAB — URINE CULTURE, ROUTINE: NORMAL

## 2022-07-21 NOTE — PROGRESS NOTES
Subjective: Follow up today from bilateral nipple and areola three-dimensional tattooing . Denies fever, nausea, vomiting, leg pain or swelling, pain is absent. She voices overall satisfaction with her result. She enjoys the pigment at this time of her tattoo. Objective: There were no vitals taken for this visit. Left breast- Areola pigment uptake intact with noted 3 dimensional pastor glands. Nipple tattooing with good pigmented uptake. Breast implant is soft on palpation as well as the reconstruction without any palpable masses or signs of capsular contracture    Right breast- Areola pigment uptake intact with noted 3 dimensional pastor glands. Nipple tattooing with good pigmented uptake.   Breast implant is soft on palpation as well as the reconstruction without any palpable masses or signs of capsular contracture      Assessment:    CBC:   Lab Results   Component Value Date/Time    WBC 14.5 07/10/2022 10:14 AM    RBC 4.31 07/10/2022 10:14 AM    HGB 12.3 07/10/2022 10:14 AM    HCT 38.0 07/10/2022 10:14 AM    MCV 88.2 07/10/2022 10:14 AM    MCH 28.5 07/10/2022 10:14 AM    MCHC 32.4 07/10/2022 10:14 AM    RDW 13.7 07/10/2022 10:14 AM     07/10/2022 10:14 AM    MPV 8.5 07/10/2022 10:14 AM     BMP:    Lab Results   Component Value Date/Time     07/10/2022 10:14 AM    K 4.3 07/10/2022 10:14 AM    K 5.9 10/28/2021 08:59 AM     07/10/2022 10:14 AM    CO2 24 07/10/2022 10:14 AM    BUN 22 07/10/2022 10:14 AM    LABALBU 4.7 01/14/2022 10:26 AM    CREATININE 0.9 07/10/2022 10:14 AM    CALCIUM 9.8 07/10/2022 10:14 AM    GFRAA >60 07/10/2022 10:14 AM    LABGLOM >60 07/10/2022 10:14 AM    GLUCOSE 107 07/10/2022 10:14 AM     Hepatic Function Panel:    Lab Results   Component Value Date/Time    ALKPHOS 76 01/14/2022 10:26 AM    ALT 9 01/14/2022 10:26 AM    AST 14 01/14/2022 10:26 AM    PROT 7.3 01/14/2022 10:26 AM    BILITOT 0.3 01/14/2022 10:26 AM    LABALBU 4.7 01/14/2022 10:26 AM Patient Active Problem List   Diagnosis    Acquired hypothyroidism    Malignant neoplasm of upper-outer quadrant of right breast in female, estrogen receptor negative ([facility] [address]       Plan:     Breast Reconstruction Tattooing    Patient will not require additional touch up procedure for more adequate pigment uptake     I informed the patient today that they can continue with a bra of their choice. I explained to the patient that although their breast reconstruction has settled there may be continued changes. It is okay for the patient to continue with scar massage at this time as I have directed them. I informed the patient that they may have subtle changes to the breast as far as scar maturity and breast parenchyma changes. I advised the patient to bring any new masses or suspicious lesions to our office attention or their primary care physician as well as their OB/GYN. They will need to continue with regularly scheduled mammograms as seen fit by their age if indicated as well as MRI to assess the implant 5-6 years post operatively     Post op year since placement of silicone breast implant - 0.5    Continue with regularly scheduled routine visits with medical oncology and primary care provider as directed. FU- 1 year    Educate the patient contact office with any concerns or signs of infection moving forward.     TIHS Lopez

## 2022-07-22 ENCOUNTER — OFFICE VISIT (OUTPATIENT)
Dept: SURGERY | Age: 57
End: 2022-07-22

## 2022-07-22 VITALS — TEMPERATURE: 96.8 F

## 2022-07-22 DIAGNOSIS — Z90.13 ACQUIRED ABSENCE OF BOTH NIPPLES: ICD-10-CM

## 2022-07-22 DIAGNOSIS — Z98.890 S/P BREAST RECONSTRUCTION: Primary | ICD-10-CM

## 2022-07-22 PROCEDURE — 99024 POSTOP FOLLOW-UP VISIT: CPT | Performed by: PHYSICIAN ASSISTANT

## 2022-12-07 NOTE — OP NOTE
Cardiac Cath Lab Recovery Arrival Note:      Nixon Esquivel arrived to Cardiac Cath Lab, Recovery Area. Staff introduced to patient. Patient identifiers verified with NAME and DATE OF BIRTH. Procedure verified with patient. Consent forms reviewed and signed by patient or authorized representative and verified. Allergies verified. Patient informed of procedure and plan of care. Questions answered with review. Patient prepped for procedure, per orders from physician, prior to arrival.    Patient on cardiac monitor, non-invasive blood pressure, SPO2 monitor. Patient is A&Ox 4. Patient reports no complaints. Patient in stretcher, in low position, with side rails up, call bell within reach, patient instructed to call of assistance as needed. Patient prep in: [facility] Recovery Area, Bed# 2. Family in: waiting room. Prep by: [facility]  OPERATIVE NOTE    Preoperative diagnosis:  Stage IB right breast cancer, TNBC    Post-operative diagnosis: same    Procedure:  mediport insertion 8F Bard (MRI and CT compatible), fluoroscopic guidance    Surgeon:  Kadi Urias MD    Assistant:  Briana Riley MD, PGY-II    Anesthesia:  LMAC    EBL:  5cc    Specimen:  None    Disposition: To PACU and discharge home after CXR    Indications:  55y/o F  with TNBC presents for mediport insertion. This procedure has been fully reviewed with the patient and written informed consent has been obtained. Operative report:    Patient was brought into the operative suite and after appropriate anesthesia, rolled towel placed under scapulas and then prepped and draped in standard surgical fashion. Ancef 2g IV was given. 0.25% marcaine used for local anesthesia. The left subclavian vein was accessed on the first stick was the 14G needle. Wire was easily passed into the IVC and checked with fluoro. A subcutaneous pocket was made over the left chest wall by first anesthetizing with 0.25% marcaine then making a 4cm skin incision with #15 blade scalpel. Bovie electrocautery was used to incise the subcutaneous tissue to creat a pocket for the port. An #11 blade scalpel was used to enlarge the wire insertion site. The tunneling device was used to tunnel the catheter from the insertion site to the pocket. The dilator and sheath were placed over the wire under fluoro. The dilator was removed and the catheter placed under fluoro. Once in the SVC, the catheter was cut to length and the catheter attached to the locking device and port. The port was flushed with heparin and had good return of blood and then flushed. The pocket irrigated with normal saline. The port was attached to Jacob's fascia with 3-0 Prolene x 2.   The skin was then closed with 3-0 Vicryl in a deep dermal fashion and skin closed with 4-0 monocryl in a running subcuticular fashion. The insertion site was closed with 4-0 monocryl. The skin was cleaned with wet and dry sponge. The wounds were dressed with Skin-afix. The sponge, instrument and needle counts were correct at the end of the case. The patient tolerated the procedure well. She will now go to recovery room and get a chest x-ray and be discharge home later today. Family updated after procedure.       James Kay MD, MSc, FACS  6/21/2021  8:03 AM

## 2022-12-09 ENCOUNTER — OFFICE VISIT (OUTPATIENT)
Dept: BREAST CENTER | Age: 57
End: 2022-12-09
Payer: COMMERCIAL

## 2022-12-09 VITALS
BODY MASS INDEX: 24.45 KG/M2 | TEMPERATURE: 98.4 F | DIASTOLIC BLOOD PRESSURE: 80 MMHG | HEIGHT: 63 IN | SYSTOLIC BLOOD PRESSURE: 132 MMHG | OXYGEN SATURATION: 99 % | RESPIRATION RATE: 12 BRPM | HEART RATE: 82 BPM | WEIGHT: 138 LBS

## 2022-12-09 DIAGNOSIS — Z85.3 PERSONAL HISTORY OF BREAST CANCER: Primary | ICD-10-CM

## 2022-12-09 PROCEDURE — 99213 OFFICE O/P EST LOW 20 MIN: CPT | Performed by: SURGERY

## 2022-12-09 ASSESSMENT — ENCOUNTER SYMPTOMS
BLOOD IN STOOL: 0
COUGH: 0
EYES NEGATIVE: 1
DIARRHEA: 0
ANAL BLEEDING: 0
ALLERGIC/IMMUNOLOGIC NEGATIVE: 1
VOMITING: 0
CONSTIPATION: 0
ABDOMINAL PAIN: 0
ABDOMINAL DISTENTION: 0
BACK PAIN: 0
GASTROINTESTINAL NEGATIVE: 1
NAUSEA: 0
RESPIRATORY NEGATIVE: 1
SHORTNESS OF BREATH: 0

## 2022-12-09 NOTE — PROGRESS NOTES
[facility]  PROGRESS NOTE  ATTENDING NOTE    Chief Complaint   Patient presents with    Breast Cancer     6 month follow up - personal history breast cancer- Right mastectomy DOS 10/28/2021 recon with . Patient states doing good. S:  59y/o F with h/o right breast cancer TNBC, s/p bilateral mastectomy with right SLNBx and reconstruction. She is doing well. She has been working a lot. I discussed colonoscopy but she states she has too many bills right now. She did have a positive cologuard at her PCP office and I explained the importance of getting a colonoscopy within the next few months. Review of Systems   Constitutional: Negative. Negative for activity change, appetite change and unexpected weight change. HENT: Negative. Eyes: Negative. Respiratory: Negative. Negative for cough and shortness of breath. Cardiovascular: Negative. Negative for chest pain and leg swelling. Gastrointestinal: Negative. Negative for abdominal distention, abdominal pain, anal bleeding, blood in stool, constipation, diarrhea, nausea and vomiting. Endocrine: Negative. Genitourinary: Negative. Musculoskeletal: Negative. Negative for arthralgias, back pain, gait problem, joint swelling and myalgias. Skin: Negative. Allergic/Immunologic: Negative. Neurological: Negative. Negative for dizziness, weakness and headaches. Hematological: Negative. Psychiatric/Behavioral: Negative. Negative for confusion, decreased concentration and sleep disturbance. Brain fog from chemo; getting better; forgetful with names     /80 (Site: Left Upper Arm, Position: Sitting, Cuff Size: Medium Adult)   Pulse 82   Temp 98.4 °F (36.9 °C) (Temporal)   Resp 12   Ht 5' 3\" (1.6 m)   Wt 138 lb (62.6 kg)   SpO2 99%   BMI 24.45 kg/m²   Physical Exam  Constitutional:       Appearance: Normal appearance. HENT:      Head: Normocephalic and atraumatic.       Nose: Nose normal.      Mouth/Throat:      Mouth: Mucous membranes are moist.      Pharynx: Oropharynx is clear. Eyes:      Extraocular Movements: Extraocular movements intact. Pupils: Pupils are equal, round, and reactive to light. Cardiovascular:      Rate and Rhythm: Normal rate and regular rhythm. Pulses: Normal pulses. Heart sounds: Normal heart sounds. Pulmonary:      Effort: Pulmonary effort is normal.      Breath sounds: Normal breath sounds. Chest:   Breasts:     Right: Absent. Left: Absent. Abdominal:      General: There is no distension. Palpations: Abdomen is soft. Tenderness: There is no abdominal tenderness. Musculoskeletal:         General: No tenderness or signs of injury. Cervical back: Normal range of motion and neck supple. Lymphadenopathy:      Cervical: No cervical adenopathy. Right cervical: No superficial cervical adenopathy. Left cervical: No superficial cervical adenopathy. Upper Body:      Right upper body: No supraclavicular or axillary adenopathy. Left upper body: No supraclavicular or axillary adenopathy. Skin:     General: Skin is warm and dry. Neurological:      General: No focal deficit present. Mental Status: She is alert and oriented to person, place, and time. Psychiatric:         Mood and Affect: Mood normal.         Behavior: Behavior normal.         Thought Content:  Thought content normal.         Judgment: Judgment normal.     ASSESSMENT/PLAN:  Right breast cancer--s/p bilateral mastectomy with right SLNBx; [id number];cM0  --continue monthly self exams and bring any changes to our attention  --maintain a healthy weight with diet and exercise  --I stressed the importance of getting a colonoscopy  --RTC 6 months    Leticia Barnhart MD, MSc, FACS  12/9/2022  12:04 PM

## 2023-01-25 ENCOUNTER — OFFICE VISIT (OUTPATIENT)
Dept: PRIMARY CARE CLINIC | Age: 58
End: 2023-01-25
Payer: COMMERCIAL

## 2023-01-25 VITALS
OXYGEN SATURATION: 96 % | BODY MASS INDEX: 25.58 KG/M2 | SYSTOLIC BLOOD PRESSURE: 120 MMHG | WEIGHT: 144.38 LBS | DIASTOLIC BLOOD PRESSURE: 78 MMHG | TEMPERATURE: 98.6 F | HEIGHT: 63 IN | HEART RATE: 84 BPM

## 2023-01-25 DIAGNOSIS — E03.9 ACQUIRED HYPOTHYROIDISM: ICD-10-CM

## 2023-01-25 DIAGNOSIS — C50.411 MALIGNANT NEOPLASM OF UPPER-OUTER QUADRANT OF RIGHT BREAST IN FEMALE, ESTROGEN RECEPTOR NEGATIVE (HCC): ICD-10-CM

## 2023-01-25 DIAGNOSIS — Z17.1 MALIGNANT NEOPLASM OF UPPER-OUTER QUADRANT OF RIGHT BREAST IN FEMALE, ESTROGEN RECEPTOR NEGATIVE (HCC): ICD-10-CM

## 2023-01-25 DIAGNOSIS — Z13.6 SCREENING FOR CARDIOVASCULAR CONDITION: ICD-10-CM

## 2023-01-25 DIAGNOSIS — R73.01 IMPAIRED FASTING GLUCOSE: ICD-10-CM

## 2023-01-25 DIAGNOSIS — E03.9 ACQUIRED HYPOTHYROIDISM: Primary | ICD-10-CM

## 2023-01-25 DIAGNOSIS — E55.9 VITAMIN D INSUFFICIENCY: ICD-10-CM

## 2023-01-25 LAB
ALBUMIN SERPL-MCNC: 4.4 G/DL (ref 3.5–5.2)
ALP BLD-CCNC: 79 U/L (ref 35–104)
ALT SERPL-CCNC: 13 U/L (ref 0–32)
ANION GAP SERPL CALCULATED.3IONS-SCNC: 14 MMOL/L (ref 7–16)
AST SERPL-CCNC: 19 U/L (ref 0–31)
BASOPHILS ABSOLUTE: 0.08 E9/L (ref 0–0.2)
BASOPHILS RELATIVE PERCENT: 1 % (ref 0–2)
BILIRUB SERPL-MCNC: 0.2 MG/DL (ref 0–1.2)
BILIRUBIN URINE: NEGATIVE
BLOOD, URINE: NEGATIVE
BUN BLDV-MCNC: 12 MG/DL (ref 6–20)
CALCIUM SERPL-MCNC: 9.5 MG/DL (ref 8.6–10.2)
CHLORIDE BLD-SCNC: 99 MMOL/L (ref 98–107)
CHOLESTEROL, TOTAL: 240 MG/DL (ref 0–199)
CLARITY: CLEAR
CO2: 25 MMOL/L (ref 22–29)
COLOR: YELLOW
CREAT SERPL-MCNC: 0.8 MG/DL (ref 0.5–1)
EOSINOPHILS ABSOLUTE: 0.2 E9/L (ref 0.05–0.5)
EOSINOPHILS RELATIVE PERCENT: 2.4 % (ref 0–6)
FOLATE: 11.9 NG/ML (ref 4.8–24.2)
GFR SERPL CREATININE-BSD FRML MDRD: >60 ML/MIN/1.73
GLUCOSE BLD-MCNC: 89 MG/DL (ref 74–99)
GLUCOSE URINE: NEGATIVE MG/DL
HBA1C MFR BLD: 5.4 % (ref 4–5.6)
HCT VFR BLD CALC: 41.9 % (ref 34–48)
HDLC SERPL-MCNC: 73 MG/DL
HEMOGLOBIN: 13.6 G/DL (ref 11.5–15.5)
IMMATURE GRANULOCYTES #: 0.02 E9/L
IMMATURE GRANULOCYTES %: 0.2 % (ref 0–5)
KETONES, URINE: NEGATIVE MG/DL
LDL CHOLESTEROL CALCULATED: 144 MG/DL (ref 0–99)
LEUKOCYTE ESTERASE, URINE: NEGATIVE
LYMPHOCYTES ABSOLUTE: 1.89 E9/L (ref 1.5–4)
LYMPHOCYTES RELATIVE PERCENT: 22.6 % (ref 20–42)
MCH RBC QN AUTO: 28.5 PG (ref 26–35)
MCHC RBC AUTO-ENTMCNC: 32.5 % (ref 32–34.5)
MCV RBC AUTO: 87.7 FL (ref 80–99.9)
MONOCYTES ABSOLUTE: 0.44 E9/L (ref 0.1–0.95)
MONOCYTES RELATIVE PERCENT: 5.3 % (ref 2–12)
NEUTROPHILS ABSOLUTE: 5.72 E9/L (ref 1.8–7.3)
NEUTROPHILS RELATIVE PERCENT: 68.5 % (ref 43–80)
NITRITE, URINE: NEGATIVE
PDW BLD-RTO: 13.4 FL (ref 11.5–15)
PH UA: 5.5 (ref 5–9)
PLATELET # BLD: 323 E9/L (ref 130–450)
PMV BLD AUTO: 9.4 FL (ref 7–12)
POTASSIUM SERPL-SCNC: 4.4 MMOL/L (ref 3.5–5)
PROTEIN UA: NEGATIVE MG/DL
RBC # BLD: 4.78 E12/L (ref 3.5–5.5)
SODIUM BLD-SCNC: 138 MMOL/L (ref 132–146)
SPECIFIC GRAVITY UA: 1.02 (ref 1–1.03)
T4 FREE: 1.54 NG/DL (ref 0.93–1.7)
TOTAL PROTEIN: 8 G/DL (ref 6.4–8.3)
TRIGL SERPL-MCNC: 113 MG/DL (ref 0–149)
TSH SERPL DL<=0.05 MIU/L-ACNC: 3.71 UIU/ML (ref 0.27–4.2)
UROBILINOGEN, URINE: 0.2 E.U./DL
VITAMIN B-12: >2000 PG/ML (ref 211–946)
VITAMIN D 25-HYDROXY: 39 NG/ML (ref 30–100)
VLDLC SERPL CALC-MCNC: 23 MG/DL
WBC # BLD: 8.4 E9/L (ref 4.5–11.5)

## 2023-01-25 PROCEDURE — 99213 OFFICE O/P EST LOW 20 MIN: CPT | Performed by: FAMILY MEDICINE

## 2023-01-25 RX ORDER — AZELASTINE 1 MG/ML
1 SPRAY, METERED NASAL 2 TIMES DAILY
Qty: 1 EACH | Refills: 5 | Status: SHIPPED | OUTPATIENT
Start: 2023-01-25

## 2023-01-25 SDOH — ECONOMIC STABILITY: FOOD INSECURITY: WITHIN THE PAST 12 MONTHS, THE FOOD YOU BOUGHT JUST DIDN'T LAST AND YOU DIDN'T HAVE MONEY TO GET MORE.: NEVER TRUE

## 2023-01-25 SDOH — ECONOMIC STABILITY: FOOD INSECURITY: WITHIN THE PAST 12 MONTHS, YOU WORRIED THAT YOUR FOOD WOULD RUN OUT BEFORE YOU GOT MONEY TO BUY MORE.: NEVER TRUE

## 2023-01-25 ASSESSMENT — ENCOUNTER SYMPTOMS
COUGH: 0
DIARRHEA: 0
VOMITING: 0
SHORTNESS OF BREATH: 0
WHEEZING: 0
BACK PAIN: 0
NAUSEA: 0
ABDOMINAL PAIN: 0
CONSTIPATION: 0

## 2023-01-25 ASSESSMENT — SOCIAL DETERMINANTS OF HEALTH (SDOH): HOW HARD IS IT FOR YOU TO PAY FOR THE VERY BASICS LIKE FOOD, HOUSING, MEDICAL CARE, AND HEATING?: NOT HARD AT ALL

## 2023-01-25 NOTE — PROGRESS NOTES
23  Lacy Coronado : 1965 Sex: female  Age: 62 y.o. Chief Complaint   Patient presents with    Hypothyroidism     HPI:  62 y.o. female patient presents today for 6 month follow up of chronic medical conditions, medication refills and FBW. Patient's chart, medical, surgical and medication history all reviewed. Hypothyroidism  Patient presents for routine follow up of Hypothyroidism. Current symptoms: none. Patient denies change in energy level, diarrhea, heat / cold intolerance, nervousness, palpitations and weight changes. Symptoms have been well-controlled. No difficulty swallowing or masses felt. Last TSH was 1.860. Patient is currently taking levothyroxine 50 mcg. Breast Cancer  Patient diagnosed with invasive ductal carcinoma of the R breast in May 2021. Treated with chemo and radiation. Underwent bilateral mastectomy with reconstruction in October. Doing well overall today. ROS:  Review of Systems   Constitutional:  Negative for chills, fatigue and fever. Respiratory:  Negative for cough, shortness of breath and wheezing. Cardiovascular:  Negative for chest pain and palpitations. Gastrointestinal:  Negative for abdominal pain, constipation, diarrhea, nausea and vomiting. Musculoskeletal:  Negative for arthralgias and back pain. Skin:  Negative for rash. Neurological:  Negative for dizziness and headaches. Psychiatric/Behavioral:  Negative for dysphoric mood. The patient is not nervous/anxious. All other systems reviewed and are negative.      Current Outpatient Medications on File Prior to Visit   Medication Sig Dispense Refill    levothyroxine (SYNTHROID) 50 MCG tablet Take 1 tablet by mouth Daily 90 tablet 3    VITAMIN E PO Take by mouth      Vitamin D (CHOLECALCIFEROL) 25 MCG (1000 UT) TABS tablet Take 1,000 Units by mouth daily      Ascorbic Acid (VITAMIN C) 250 MG tablet Take 500 mg by mouth daily      loratadine (CLARITIN) 10 MG tablet Take 10 mg by mouth daily      APPLE CIDER VINEGAR PO Take by mouth      FIBER PO Take by mouth      vitamin B-12 (CYANOCOBALAMIN) 100 MCG tablet Take 50 mcg by mouth daily       No current facility-administered medications on file prior to visit.        Allergies   Allergen Reactions    Oxycodone-Acetaminophen Nausea And Vomiting       Past Medical History:   Diagnosis Date    Anxiety     Cancer ([facility] [address] 05/2021    breast     Depression     History of cancer chemotherapy 6/2021-8/30/2021    Hypothyroidism      Past Surgical History:   Procedure Laterality Date    BREAST BIOPSY Left     BREAST RECONSTRUCTION Bilateral 10/28/2021    LEFT PROPHYLACTIC MASTECTOMY WITH BILTATER FIRST STAGE RECONSTRUCTION USING TISSUE EXPANDER AND ALLODERM performed by Kiera Beltran MD at [address] Bilateral 01/25/2022    SECOND STAGE BREAST RECONSTRUCTION WITH EXCHANGE OF TISSUE EXPANDERS FOR PERMENANT SILICON BREAST IMPLANTS performed by Kiera Beltran MD at [address] [facility] Right 10/28/2021    RIGHT BREAST MASTECTOMY WITH SENTINEL LYMPH NODE BIOPSY POSSIBLE AXILLARY LYMPH NODE DISSECTION - [place] performed by Tim Newell MD at [address] [facility], BILATERAL      PARTIAL HYSTERECTOMY (CERVIX NOT REMOVED) Left     Left ovary gone due to endometriosis - has cervix and right ovary    PORT SURGERY Left 06/21/2021    MEDI PORT INSERTION LEFT SIDE PLACEMENT performed by Tim Newell MD at [facility] LOC DEVICE 1ST LESION RIGHT Right 05/06/2021    US BREAST NEEDLE BIOPSY RIGHT 5/6/2021 [facility]     Family History   Problem Relation Age of Onset    No Known Problems Father     Cervical Cancer Sister     Uterine Cancer Sister         unsure    No Known Problems Brother     Cancer Paternal Aunt      Social History     Socioeconomic History    Marital status:      Spouse name: Not on file    Number of children: Not on file    Years of education: Not on file    Highest education level: Not on file   Occupational History    Not on file   Tobacco Use    Smoking status: Former     Packs/day: 1.00     Years: 10.00     Pack years: 10.00     Types: Cigarettes     Quit date:      Years since quittin.0    Smokeless tobacco: Never   Vaping Use    Vaping Use: Never used   Substance and Sexual Activity    Alcohol use: Not Currently    Drug use: Never    Sexual activity: Not Currently   Other Topics Concern    Not on file   Social History Narrative    Not on file     Social Determinants of Health     Financial Resource Strain: Low Risk     Difficulty of Paying Living Expenses: Not hard at all   Food Insecurity: No Food Insecurity    Worried About Running Out of Food in the Last Year: Never true    Ran Out of Food in the Last Year: Never true   Transportation Needs: Not on file   Physical Activity: Not on file   Stress: Not on file   Social Connections: Not on file   Intimate Partner Violence: Not on file   Housing Stability: Not on file       Vitals:    23 1051   BP: 120/78   Pulse: 84   Temp: 98.6 °F (37 °C)   SpO2: 96%   Weight: 144 lb 6 oz (65.5 kg)   Height: 5' 3\" (1.6 m)       Physical Exam:  Physical Exam  Vitals and nursing note reviewed. Constitutional:       General: She is not in acute distress. Appearance: Normal appearance. She is well-developed and normal weight. She is not ill-appearing. HENT:      Head: Normocephalic and atraumatic. Right Ear: Hearing and external ear normal.      Left Ear: Hearing and external ear normal.      Nose: Nose normal.      Mouth/Throat:      Mouth: Mucous membranes are moist.   Eyes:      General: Lids are normal. No scleral icterus. Extraocular Movements: Extraocular movements intact. Conjunctiva/sclera: Conjunctivae normal.   Neck:      Thyroid: No thyromegaly. Vascular: No carotid bruit. Cardiovascular:      Rate and Rhythm: Normal rate and regular rhythm. Heart sounds: Normal heart sounds.  No murmur heard.  Pulmonary:      Effort: Pulmonary effort is normal. No respiratory distress. Breath sounds: Normal breath sounds. No wheezing. Musculoskeletal:         General: No tenderness or deformity. Normal range of motion. Cervical back: Normal range of motion and neck supple. Right lower leg: No edema. Left lower leg: No edema. Lymphadenopathy:      Cervical: No cervical adenopathy. Skin:     General: Skin is warm and dry. Findings: No rash. Neurological:      General: No focal deficit present. Mental Status: She is alert and oriented to person, place, and time. Gait: Gait normal.   Psychiatric:         Mood and Affect: Mood and affect normal.         Speech: Speech normal.         Behavior: Behavior normal.         Thought Content:  Thought content normal.       Labs:  CBC with Differential:    Lab Results   Component Value Date/Time    WBC 14.5 07/10/2022 10:14 AM    RBC 4.31 07/10/2022 10:14 AM    HGB 12.3 07/10/2022 10:14 AM    HCT 38.0 07/10/2022 10:14 AM     07/10/2022 10:14 AM    MCV 88.2 07/10/2022 10:14 AM    MCH 28.5 07/10/2022 10:14 AM    MCHC 32.4 07/10/2022 10:14 AM    RDW 13.7 07/10/2022 10:14 AM    METASPCT 0.9 08/13/2021 11:25 AM    LYMPHOPCT 23.0 01/14/2022 10:26 AM    MONOPCT 5.7 01/14/2022 10:26 AM    MYELOPCT 0.9 08/13/2021 11:25 AM    BASOPCT 1.0 01/14/2022 10:26 AM    MONOSABS 0.40 01/14/2022 10:26 AM    LYMPHSABS 1.62 01/14/2022 10:26 AM    EOSABS 0.30 01/14/2022 10:26 AM    BASOSABS 0.07 01/14/2022 10:26 AM     CMP:    Lab Results   Component Value Date/Time     07/10/2022 10:14 AM    K 4.3 07/10/2022 10:14 AM    K 5.9 10/28/2021 08:59 AM     07/10/2022 10:14 AM    CO2 24 07/10/2022 10:14 AM    BUN 22 07/10/2022 10:14 AM    CREATININE 0.9 07/10/2022 10:14 AM    GFRAA >60 07/10/2022 10:14 AM    LABGLOM >60 07/10/2022 10:14 AM    GLUCOSE 107 07/10/2022 10:14 AM    PROT 7.3 01/14/2022 10:26 AM    LABALBU 4.7 01/14/2022 10:26 AM CALCIUM 9.8 07/10/2022 10:14 AM    BILITOT 0.3 01/14/2022 10:26 AM    ALKPHOS 76 01/14/2022 10:26 AM    AST 14 01/14/2022 10:26 AM    ALT 9 01/14/2022 10:26 AM     HgBA1c:    Lab Results   Component Value Date/Time    LABA1C 5.2 01/14/2022 10:26 AM     FLP:    Lab Results   Component Value Date/Time    TRIG 56 01/14/2022 10:26 AM    HDL 61 01/14/2022 10:26 AM    LDLCALC 123 01/14/2022 10:26 AM    LABVLDL 11 01/14/2022 10:26 AM     TSH:    Lab Results   Component Value Date/Time    TSH 1.860 01/14/2022 10:26 AM        Assessment and Plan:  Denise Alvarado was seen today for hypothyroidism. Diagnoses and all orders for this visit:    Acquired hypothyroidism  Patient feeling well. Labs ordered already, will complete today    Malignant neoplasm of upper-outer quadrant of right breast in female, estrogen receptor negative ([facility] [address]  Currently in remission. Following with [facility] regularly. Other orders  -     azelastine (ASTELIN) 0.1 % nasal spray; 1 spray by Nasal route 2 times daily Use in each nostril as directed      Return in about 6 months (around 7/25/2023), or if symptoms worsen or fail to improve, for Well visit.       Seen By:  Layo Catching, DO

## 2023-06-05 ASSESSMENT — ENCOUNTER SYMPTOMS
RESPIRATORY NEGATIVE: 1
DIARRHEA: 0
VOMITING: 0
NAUSEA: 0
ANAL BLEEDING: 0
CONSTIPATION: 0
ABDOMINAL PAIN: 0
ABDOMINAL DISTENTION: 0
CHEST TIGHTNESS: 0
COUGH: 0
BACK PAIN: 0
SHORTNESS OF BREATH: 0
CHOKING: 0
WHEEZING: 0

## 2023-06-07 ENCOUNTER — OFFICE VISIT (OUTPATIENT)
Dept: BREAST CENTER | Age: 58
End: 2023-06-07
Payer: COMMERCIAL

## 2023-06-07 VITALS
OXYGEN SATURATION: 98 % | HEIGHT: 63 IN | TEMPERATURE: 97.6 F | WEIGHT: 149 LBS | RESPIRATION RATE: 12 BRPM | BODY MASS INDEX: 26.4 KG/M2 | SYSTOLIC BLOOD PRESSURE: 118 MMHG | HEART RATE: 82 BPM | DIASTOLIC BLOOD PRESSURE: 82 MMHG

## 2023-06-07 DIAGNOSIS — Z85.3 PERSONAL HISTORY OF BREAST CANCER: Primary | ICD-10-CM

## 2023-06-07 PROCEDURE — 99212 OFFICE O/P EST SF 10 MIN: CPT | Performed by: NURSE PRACTITIONER

## 2023-06-07 PROCEDURE — 99213 OFFICE O/P EST LOW 20 MIN: CPT | Performed by: NURSE PRACTITIONER

## 2023-06-07 NOTE — PATIENT INSTRUCTIONS
6 month follow up in [place] office. Any questions or concerns, please contact the office at [phone].

## 2023-07-16 DIAGNOSIS — E03.9 ACQUIRED HYPOTHYROIDISM: ICD-10-CM

## 2023-07-17 RX ORDER — LEVOTHYROXINE SODIUM 0.05 MG/1
TABLET ORAL
Qty: 30 TABLET | Refills: 5 | Status: SHIPPED | OUTPATIENT
Start: 2023-07-17

## 2023-07-24 ENCOUNTER — OFFICE VISIT (OUTPATIENT)
Dept: SURGERY | Age: 58
End: 2023-07-24
Payer: COMMERCIAL

## 2023-07-24 VITALS — TEMPERATURE: 97.6 F

## 2023-07-24 DIAGNOSIS — Z98.890 S/P BREAST RECONSTRUCTION: Primary | ICD-10-CM

## 2023-07-24 PROCEDURE — 99212 OFFICE O/P EST SF 10 MIN: CPT | Performed by: PHYSICIAN ASSISTANT

## 2023-07-26 ENCOUNTER — OFFICE VISIT (OUTPATIENT)
Dept: PRIMARY CARE CLINIC | Age: 58
End: 2023-07-26
Payer: COMMERCIAL

## 2023-07-26 VITALS
HEIGHT: 63 IN | OXYGEN SATURATION: 97 % | BODY MASS INDEX: 26.58 KG/M2 | SYSTOLIC BLOOD PRESSURE: 126 MMHG | TEMPERATURE: 97.5 F | WEIGHT: 150 LBS | HEART RATE: 51 BPM | DIASTOLIC BLOOD PRESSURE: 70 MMHG

## 2023-07-26 DIAGNOSIS — E03.9 ACQUIRED HYPOTHYROIDISM: ICD-10-CM

## 2023-07-26 DIAGNOSIS — Z13.6 SCREENING FOR CARDIOVASCULAR CONDITION: ICD-10-CM

## 2023-07-26 DIAGNOSIS — Z00.00 ENCOUNTER FOR WELL ADULT EXAM WITHOUT ABNORMAL FINDINGS: Primary | ICD-10-CM

## 2023-07-26 DIAGNOSIS — E55.9 VITAMIN D INSUFFICIENCY: ICD-10-CM

## 2023-07-26 DIAGNOSIS — R73.01 IMPAIRED FASTING GLUCOSE: ICD-10-CM

## 2023-07-26 PROCEDURE — 93000 ELECTROCARDIOGRAM COMPLETE: CPT | Performed by: FAMILY MEDICINE

## 2023-07-26 PROCEDURE — 99396 PREV VISIT EST AGE 40-64: CPT | Performed by: FAMILY MEDICINE

## 2023-07-26 SDOH — ECONOMIC STABILITY: HOUSING INSECURITY
IN THE LAST 12 MONTHS, WAS THERE A TIME WHEN YOU DID NOT HAVE A STEADY PLACE TO SLEEP OR SLEPT IN A SHELTER (INCLUDING NOW)?: NO

## 2023-07-26 SDOH — ECONOMIC STABILITY: INCOME INSECURITY: HOW HARD IS IT FOR YOU TO PAY FOR THE VERY BASICS LIKE FOOD, HOUSING, MEDICAL CARE, AND HEATING?: NOT HARD AT ALL

## 2023-07-26 SDOH — ECONOMIC STABILITY: FOOD INSECURITY: WITHIN THE PAST 12 MONTHS, YOU WORRIED THAT YOUR FOOD WOULD RUN OUT BEFORE YOU GOT MONEY TO BUY MORE.: NEVER TRUE

## 2023-07-26 SDOH — ECONOMIC STABILITY: FOOD INSECURITY: WITHIN THE PAST 12 MONTHS, THE FOOD YOU BOUGHT JUST DIDN'T LAST AND YOU DIDN'T HAVE MONEY TO GET MORE.: NEVER TRUE

## 2023-07-26 ASSESSMENT — PATIENT HEALTH QUESTIONNAIRE - PHQ9
SUM OF ALL RESPONSES TO PHQ QUESTIONS 1-9: 1
4. FEELING TIRED OR HAVING LITTLE ENERGY: 0
SUM OF ALL RESPONSES TO PHQ9 QUESTIONS 1 & 2: 0
5. POOR APPETITE OR OVEREATING: 1
8. MOVING OR SPEAKING SO SLOWLY THAT OTHER PEOPLE COULD HAVE NOTICED. OR THE OPPOSITE, BEING SO FIGETY OR RESTLESS THAT YOU HAVE BEEN MOVING AROUND A LOT MORE THAN USUAL: 0
1. LITTLE INTEREST OR PLEASURE IN DOING THINGS: 0
SUM OF ALL RESPONSES TO PHQ QUESTIONS 1-9: 1
10. IF YOU CHECKED OFF ANY PROBLEMS, HOW DIFFICULT HAVE THESE PROBLEMS MADE IT FOR YOU TO DO YOUR WORK, TAKE CARE OF THINGS AT HOME, OR GET ALONG WITH OTHER PEOPLE: 0
9. THOUGHTS THAT YOU WOULD BE BETTER OFF DEAD, OR OF HURTING YOURSELF: 0
7. TROUBLE CONCENTRATING ON THINGS, SUCH AS READING THE NEWSPAPER OR WATCHING TELEVISION: 0
SUM OF ALL RESPONSES TO PHQ QUESTIONS 1-9: 1
3. TROUBLE FALLING OR STAYING ASLEEP: 0
2. FEELING DOWN, DEPRESSED OR HOPELESS: 0
6. FEELING BAD ABOUT YOURSELF - OR THAT YOU ARE A FAILURE OR HAVE LET YOURSELF OR YOUR FAMILY DOWN: 0
SUM OF ALL RESPONSES TO PHQ QUESTIONS 1-9: 1

## 2023-07-26 ASSESSMENT — ENCOUNTER SYMPTOMS
SHORTNESS OF BREATH: 0
WHEEZING: 0
NAUSEA: 0
CONSTIPATION: 0
BACK PAIN: 0
DIARRHEA: 0
COUGH: 0
ABDOMINAL PAIN: 0
VOMITING: 0

## 2023-07-26 NOTE — PROGRESS NOTES
23  [facility] : 1965 Sex: female  Age: 62 y.o. Chief Complaint   Patient presents with    Annual Exam     HPI:  62 y.o. female patient presents today for yearly physical.  Patient's chart, medical, surgical and medication history all reviewed. Well Adult Physical  Patient here for a physical exam.  The patient reports no problems. Do you take any herbs or supplements that were not prescribed by a doctor? no   Are you taking calcium supplements? no   Are you taking aspirin daily? no    Colonoscopy:  Cologuard - positive. Patient refused colonoscopy at that time. Has had PET scans for breast cancer  Dental visit: Within last 6 mos  Vision check:  No Problems  PAP: several years ago  Mammo: 2021- abnormal- + breast cancer. S/P treatment and mastectomy    Last time routine bloodwork was done:  2023    Immunization status: missing doses of Shingles    Smoking status: never    Physical activity:  intermittently    ROS:  Review of Systems   Constitutional:  Negative for chills, fatigue and fever. Respiratory:  Negative for cough, shortness of breath and wheezing. Cardiovascular:  Negative for chest pain and palpitations. Gastrointestinal:  Negative for abdominal pain, constipation, diarrhea, nausea and vomiting. Musculoskeletal:  Positive for arthralgias. Negative for back pain. Skin:  Negative for rash. Neurological:  Negative for dizziness and headaches. Psychiatric/Behavioral:  Positive for dysphoric mood. The patient is not nervous/anxious. All other systems reviewed and are negative.      Current Outpatient Medications on File Prior to Visit   Medication Sig Dispense Refill    levothyroxine (SYNTHROID) 50 MCG tablet Take 1 tablet by mouth once daily 30 tablet 5    azelastine (ASTELIN) 0.1 % nasal spray 1 spray by Nasal route 2 times daily Use in each nostril as directed 1 each 5    VITAMIN E PO Take by mouth Taking two a day unknown mg maybe  450

## 2023-11-09 DIAGNOSIS — E03.9 ACQUIRED HYPOTHYROIDISM: ICD-10-CM

## 2023-11-09 RX ORDER — LEVOTHYROXINE SODIUM 0.05 MG/1
TABLET ORAL
Qty: 90 TABLET | Refills: 2 | Status: SHIPPED | OUTPATIENT
Start: 2023-11-09

## 2023-11-27 ENCOUNTER — TELEPHONE (OUTPATIENT)
Dept: BREAST CENTER | Age: 58
End: 2023-11-27

## 2023-11-27 NOTE — TELEPHONE ENCOUNTER
RN received a message from [address] [place] [address]. today 11/27/23 that patient called in on 11/15/23 to R/S her upcoming appointment with [facility] soni on 12/11/23 in [facility]. RN attempt to contact patient to reschedule upcoming appointment per patient request. RN reached patient VM and left detailed message of why was calling and office number for patient to call office back.       Electronically signed by Patt Nunez RN on 11/27/23 at 8:32 AM EST

## 2024-01-24 DIAGNOSIS — E55.9 VITAMIN D INSUFFICIENCY: ICD-10-CM

## 2024-01-24 DIAGNOSIS — Z13.6 SCREENING FOR CARDIOVASCULAR CONDITION: ICD-10-CM

## 2024-01-24 DIAGNOSIS — E03.9 ACQUIRED HYPOTHYROIDISM: ICD-10-CM

## 2024-01-24 DIAGNOSIS — Z00.00 ENCOUNTER FOR WELL ADULT EXAM WITHOUT ABNORMAL FINDINGS: ICD-10-CM

## 2024-01-24 DIAGNOSIS — Z01.818 PREOP TESTING: ICD-10-CM

## 2024-01-24 DIAGNOSIS — R73.01 IMPAIRED FASTING GLUCOSE: ICD-10-CM

## 2024-01-24 LAB
ABSOLUTE IMMATURE GRANULOCYTE: <0.03 K/UL (ref 0–0.58)
ALBUMIN SERPL-MCNC: 4.2 G/DL (ref 3.5–5.2)
ALP BLD-CCNC: 77 U/L (ref 35–104)
ALT SERPL-CCNC: 13 U/L (ref 0–32)
ANION GAP SERPL CALCULATED.3IONS-SCNC: 15 MMOL/L (ref 7–16)
AST SERPL-CCNC: 15 U/L (ref 0–31)
BASOPHILS ABSOLUTE: 0.09 K/UL (ref 0–0.2)
BASOPHILS RELATIVE PERCENT: 1 % (ref 0–2)
BILIRUB SERPL-MCNC: <0.2 MG/DL (ref 0–1.2)
BILIRUBIN URINE: NEGATIVE
BUN BLDV-MCNC: 15 MG/DL (ref 6–20)
CALCIUM SERPL-MCNC: 8.5 MG/DL (ref 8.6–10.2)
CHLORIDE BLD-SCNC: 104 MMOL/L (ref 98–107)
CHOLESTEROL: 224 MG/DL
CO2: 20 MMOL/L (ref 22–29)
COLOR: YELLOW
COMMENT: ABNORMAL
CREAT SERPL-MCNC: 0.7 MG/DL (ref 0.5–1)
EOSINOPHILS ABSOLUTE: 0.32 K/UL (ref 0.05–0.5)
EOSINOPHILS RELATIVE PERCENT: 4 % (ref 0–6)
GFR SERPL CREATININE-BSD FRML MDRD: >60 ML/MIN/1.73M2
GLUCOSE BLD-MCNC: 83 MG/DL (ref 74–99)
GLUCOSE URINE: NEGATIVE MG/DL
HBA1C MFR BLD: 5.3 % (ref 4–5.6)
HCT VFR BLD CALC: 40.5 % (ref 34–48)
HDLC SERPL-MCNC: 78 MG/DL
HEMOGLOBIN: 13 G/DL (ref 11.5–15.5)
IMMATURE GRANULOCYTES: 0 % (ref 0–5)
KETONES, URINE: NEGATIVE MG/DL
LDL CHOLESTEROL: 135 MG/DL
LEUKOCYTE ESTERASE, URINE: NEGATIVE
LYMPHOCYTES ABSOLUTE: 2.03 K/UL (ref 1.5–4)
LYMPHOCYTES RELATIVE PERCENT: 28 % (ref 20–42)
MCH RBC QN AUTO: 28.2 PG (ref 26–35)
MCHC RBC AUTO-ENTMCNC: 32.1 G/DL (ref 32–34.5)
MCV RBC AUTO: 87.9 FL (ref 80–99.9)
MONOCYTES ABSOLUTE: 0.38 K/UL (ref 0.1–0.95)
MONOCYTES RELATIVE PERCENT: 5 % (ref 2–12)
NEUTROPHILS ABSOLUTE: 4.46 K/UL (ref 1.8–7.3)
NEUTROPHILS RELATIVE PERCENT: 61 % (ref 43–80)
NITRITE, URINE: NEGATIVE
PDW BLD-RTO: 13.4 % (ref 11.5–15)
PH UA: 6 (ref 5–9)
PLATELET # BLD: 297 K/UL (ref 130–450)
PMV BLD AUTO: 9.5 FL (ref 7–12)
POTASSIUM SERPL-SCNC: 4.4 MMOL/L (ref 3.5–5)
PROTEIN UA: NEGATIVE MG/DL
RBC # BLD: 4.61 M/UL (ref 3.5–5.5)
SODIUM BLD-SCNC: 139 MMOL/L (ref 132–146)
SPECIFIC GRAVITY UA: <1.005 (ref 1–1.03)
T4 FREE: 1.4 NG/DL (ref 0.9–1.7)
TOTAL PROTEIN: 5.5 G/DL (ref 6.4–8.3)
TRIGL SERPL-MCNC: 56 MG/DL
TSH SERPL DL<=0.05 MIU/L-ACNC: 5.74 UIU/ML (ref 0.27–4.2)
TURBIDITY: CLEAR
URINE HGB: NEGATIVE
UROBILINOGEN, URINE: 0.2 EU/DL (ref 0–1)
VITAMIN D 25-HYDROXY: 40.5 NG/ML (ref 30–100)
VLDLC SERPL CALC-MCNC: 11 MG/DL
WBC # BLD: 7.3 K/UL (ref 4.5–11.5)

## 2024-02-01 ENCOUNTER — HOSPITAL ENCOUNTER (OUTPATIENT)
Dept: MRI IMAGING | Age: 59
Discharge: HOME | End: 2024-02-03
Payer: COMMERCIAL

## 2024-02-01 ENCOUNTER — TELEPHONE (OUTPATIENT)
Dept: BREAST CENTER | Age: 59
End: 2024-02-01

## 2024-02-01 DIAGNOSIS — Z85.3 PERSONAL HISTORY OF BREAST CANCER: ICD-10-CM

## 2024-02-01 DIAGNOSIS — Z98.82 HISTORY OF BILATERAL BREAST IMPLANTS: ICD-10-CM

## 2024-02-01 DIAGNOSIS — N64.4 BREAST PAIN: ICD-10-CM

## 2024-02-01 PROCEDURE — C8908 MRI W/O FOL W/CONT, BREAST,: HCPCS

## 2024-02-01 PROCEDURE — 6360000004 HC RX CONTRAST MEDICATION: Performed by: RADIOLOGY

## 2024-02-01 PROCEDURE — A9585 GADOBUTROL INJECTION: HCPCS | Performed by: RADIOLOGY

## 2024-02-01 RX ORDER — GADOBUTROL 604.72 MG/ML
7 INJECTION INTRAVENOUS
Status: COMPLETED | OUTPATIENT
Start: 2024-02-01 | End: 2024-02-01

## 2024-02-01 RX ADMIN — GADOBUTROL 7 ML: 604.72 INJECTION INTRAVENOUS at 11:37

## 2024-02-01 NOTE — TELEPHONE ENCOUNTER
Left a voicemail for Lacey regarding her benign MRI results.  Copy faxed to medical oncology as well as primary care.  Will await return call.  We recommend she return to clinic in 6 months for clinical exam; if she prefers, we can see her as needed since we are not doing any routine breast imaging due to her history of bilateral mastectomy.    Chiara Golden (Terrie), RN, MSN, APRN-CNP, AOCNP  Advanced Oncology Certified Nurse Practitioner  Department of Breast Surgery  [facility]/  [facility] in collaboration with Dr. Sirena Solomon/Dr. China House/Dr. Elmo Golden APRN-CNP

## 2024-02-06 ENCOUNTER — TELEPHONE (OUTPATIENT)
Dept: BREAST CENTER | Age: 59
End: 2024-02-06

## 2024-02-06 NOTE — TELEPHONE ENCOUNTER
Called and left detailed message with call back to schedule a 6 month clinical appointment as recommended. If patient prefers she can be seen as needed since we are not doing breast imaging (bilateral mastectomy with recon).    Electronically signed by Chiquis Mckeon RN on 2/6/24 at 11:01 AM EST

## 2024-07-08 NOTE — PROGRESS NOTES
resection: Single lymph node, no   evidence of neoplasm.     Comment:     In the right breast, several duct cross-sections reveal ductal carcinoma in situ, 10 mm from the nearest margin (inferior); there is no evidence of residual invasive carcinoma following presurgical therapy-PCR (for invasive carcinoma).    2022 underwent second stage breast reconstruction with exchange of tissue expanders for permanent silicone breast implants.  Pathologic evaluation diagnosis: No evidence of malignancy.    On active surveillance.    Post treatment imagin2024 MRI of the bilateral breast: Benign, BI-RADS 2      Key clinical points:  2023 clinical follow-up is without secondary evidence of malignancy.  We reviewed NCCN guidelines for breast cancer follow-up.  We also discussed the importance of massage and breast/chest wall self-exam in detail.  We discussed FDA recommendations for silicone gel-filled implant rupture screening; defer to plastics and reconstructive surgery.  We discussed the option of returning to clinic in 6 months versus as needed secondary to her close and ongoing follow-up with plastics as well as medical oncology.  At this time, she prefers to return to the clinic in 6 months-[place] office.  She was advised to call in the event she would have any new breast related concerns.    2023 clinical follow up: Is without convincing evidence of recurrent disease.  She does have focal tenderness of the right breast near her lumpectomy scar.  Implants appear intact with good symmetry bilaterally.  In view of her history of multifocal, triple negative breast cancer and placement of bilateral silicone breast implants, recommend MRI of the bilateral breast to be done at this time.  In the interim, we discussed symptomatic measures including topical and oral analgesics as needed for discomfort.  She does have acute sinusitis on this visit without fever; prescription to pharmacy for

## 2024-07-15 ENCOUNTER — OFFICE VISIT (OUTPATIENT)
Dept: BREAST CENTER | Age: 59
End: 2024-07-15
Payer: COMMERCIAL

## 2024-07-15 VITALS
SYSTOLIC BLOOD PRESSURE: 130 MMHG | RESPIRATION RATE: 14 BRPM | DIASTOLIC BLOOD PRESSURE: 72 MMHG | OXYGEN SATURATION: 98 % | WEIGHT: 150 LBS | TEMPERATURE: 98.1 F | BODY MASS INDEX: 25.61 KG/M2 | HEIGHT: 64 IN | HEART RATE: 85 BPM

## 2024-07-15 DIAGNOSIS — Z85.3 PERSONAL HISTORY OF BREAST CANCER: Primary | ICD-10-CM

## 2024-07-15 PROCEDURE — 99213 OFFICE O/P EST LOW 20 MIN: CPT | Performed by: NURSE PRACTITIONER

## 2024-07-15 ASSESSMENT — ENCOUNTER SYMPTOMS
SINUS PRESSURE: 0
SINUS PAIN: 0

## 2024-07-18 NOTE — PROGRESS NOTES
AM      Patient Active Problem List   Diagnosis    Acquired hypothyroidism    Malignant neoplasm of upper-outer quadrant of right breast in female, estrogen receptor negative (HCC)     EXAMINATION:  MRI OF THE BILATERAL BREASTS WITHOUT AND WITH CONTRAST 2/1/2024 11:00 am:     TECHNIQUE:  Multiplanar, multisequence MRI of the bilateral breasts was performed without  and with the administration of intravenous contrast.     Data analysis was performed with color parametric mapping, image subtraction,  and 3D reconstructions.     COMPARISON:  Multiple prior studies, the most recent dated September 28, 2021.     HISTORY:  ORDERING SYSTEM PROVIDED HISTORY: Personal history of breast cancer  TECHNOLOGIST PROVIDED HISTORY:  STAT Creatinine as needed:->No  Reason for exam:->Pain of right implant; History of TNBC     FINDINGS:  There are postsurgical changes from bilateral mastectomy and silicone implant  reconstruction.  Bilateral silicone implants appear intact.  No suspicious  mass or non mass enhancement seen in either breast.  There is no  lymphadenopathy.  Bilateral skin is normal.  Visualized chest and abdominal  organs are unremarkable.     IMPRESSION:  1.  Benign findings with no evidence of neoplasm in either breast.     2.  Intact bilateral implants.     RECOMMENDATION:     Clinical follow-up is advised.     BIRADS:  BIRADS - CATEGORY 2     Benign Findings.     OVERALL ASSESSMENT - BENIGN     A letter of notification will be sent to the patient regarding the results.  Plan:     Breast Reconstruction Tattooing    Patient will not require additional touch up procedure for more adequate pigment uptake     I informed the patient today that they can continue with a bra of their choice.  I explained to the patient that although their breast reconstruction has settled there may be continued changes.   It is okay for the patient to continue with scar massage at this time as I have directed them.  I informed the patient  pmd

## 2024-07-19 ENCOUNTER — OFFICE VISIT (OUTPATIENT)
Dept: SURGERY | Age: 59
End: 2024-07-19

## 2024-07-19 VITALS — TEMPERATURE: 97.6 F

## 2024-07-19 DIAGNOSIS — Z98.890 S/P BREAST RECONSTRUCTION: Primary | ICD-10-CM

## 2024-08-01 ENCOUNTER — OFFICE VISIT (OUTPATIENT)
Dept: PRIMARY CARE CLINIC | Age: 59
End: 2024-08-01
Payer: COMMERCIAL

## 2024-08-01 VITALS
SYSTOLIC BLOOD PRESSURE: 122 MMHG | WEIGHT: 150 LBS | BODY MASS INDEX: 25.61 KG/M2 | DIASTOLIC BLOOD PRESSURE: 70 MMHG | HEIGHT: 64 IN | HEART RATE: 71 BPM | TEMPERATURE: 98.3 F | OXYGEN SATURATION: 98 %

## 2024-08-01 DIAGNOSIS — Z00.01 ENCOUNTER FOR WELL ADULT EXAM WITH ABNORMAL FINDINGS: Primary | ICD-10-CM

## 2024-08-01 DIAGNOSIS — E03.9 ACQUIRED HYPOTHYROIDISM: ICD-10-CM

## 2024-08-01 DIAGNOSIS — J31.0 CHRONIC RHINITIS: ICD-10-CM

## 2024-08-01 PROBLEM — Z85.3 HISTORY OF BREAST CANCER: Status: ACTIVE | Noted: 2024-08-01

## 2024-08-01 PROBLEM — Z17.1 MALIGNANT NEOPLASM OF UPPER-OUTER QUADRANT OF RIGHT BREAST IN FEMALE, ESTROGEN RECEPTOR NEGATIVE (HCC): Status: RESOLVED | Noted: 2021-06-04 | Resolved: 2024-08-01

## 2024-08-01 PROBLEM — C50.411 MALIGNANT NEOPLASM OF UPPER-OUTER QUADRANT OF RIGHT BREAST IN FEMALE, ESTROGEN RECEPTOR NEGATIVE (HCC): Status: RESOLVED | Noted: 2021-06-04 | Resolved: 2024-08-01

## 2024-08-01 PROCEDURE — 99396 PREV VISIT EST AGE 40-64: CPT | Performed by: FAMILY MEDICINE

## 2024-08-01 RX ORDER — AZELASTINE 1 MG/ML
1 SPRAY, METERED NASAL 2 TIMES DAILY
Qty: 1 EACH | Refills: 11 | Status: SHIPPED | OUTPATIENT
Start: 2024-08-01

## 2024-08-01 RX ORDER — LEVOTHYROXINE SODIUM 0.05 MG/1
TABLET ORAL
Qty: 100 TABLET | Refills: 3 | Status: SHIPPED | OUTPATIENT
Start: 2024-08-01

## 2024-08-01 RX ORDER — LEVOTHYROXINE SODIUM 0.05 MG/1
50 TABLET ORAL DAILY
Qty: 90 TABLET | Refills: 2 | Status: CANCELLED | OUTPATIENT
Start: 2024-08-01

## 2024-08-01 SDOH — ECONOMIC STABILITY: FOOD INSECURITY: WITHIN THE PAST 12 MONTHS, THE FOOD YOU BOUGHT JUST DIDN'T LAST AND YOU DIDN'T HAVE MONEY TO GET MORE.: NEVER TRUE

## 2024-08-01 SDOH — ECONOMIC STABILITY: FOOD INSECURITY: WITHIN THE PAST 12 MONTHS, YOU WORRIED THAT YOUR FOOD WOULD RUN OUT BEFORE YOU GOT MONEY TO BUY MORE.: NEVER TRUE

## 2024-08-01 SDOH — ECONOMIC STABILITY: INCOME INSECURITY: HOW HARD IS IT FOR YOU TO PAY FOR THE VERY BASICS LIKE FOOD, HOUSING, MEDICAL CARE, AND HEATING?: NOT HARD AT ALL

## 2024-08-01 ASSESSMENT — PATIENT HEALTH QUESTIONNAIRE - PHQ9
SUM OF ALL RESPONSES TO PHQ QUESTIONS 1-9: 0
2. FEELING DOWN, DEPRESSED OR HOPELESS: NOT AT ALL
SUM OF ALL RESPONSES TO PHQ QUESTIONS 1-9: 0

## 2024-08-01 ASSESSMENT — ENCOUNTER SYMPTOMS
VOMITING: 0
WHEEZING: 0
COUGH: 0
SHORTNESS OF BREATH: 0
ABDOMINAL PAIN: 0
DIARRHEA: 0
NAUSEA: 0
BACK PAIN: 0
CONSTIPATION: 0

## 2024-08-01 NOTE — PROGRESS NOTES
Right Ear: Hearing and external ear normal.      Left Ear: Hearing and external ear normal.      Nose: Nose normal.      Mouth/Throat:      Mouth: Mucous membranes are moist.   Eyes:      General: Lids are normal. No scleral icterus.     Extraocular Movements: Extraocular movements intact.      Conjunctiva/sclera: Conjunctivae normal.   Neck:      Thyroid: No thyromegaly.      Vascular: No carotid bruit.   Cardiovascular:      Rate and Rhythm: Normal rate and regular rhythm.      Heart sounds: Normal heart sounds. No murmur heard.  Pulmonary:      Effort: Pulmonary effort is normal. No respiratory distress.      Breath sounds: Normal breath sounds. No wheezing.   Musculoskeletal:         General: No tenderness or deformity. Normal range of motion.      Cervical back: Normal range of motion and neck supple.      Right lower leg: No edema.      Left lower leg: No edema.   Lymphadenopathy:      Cervical: No cervical adenopathy.   Skin:     General: Skin is warm and dry.      Findings: No rash.   Neurological:      General: No focal deficit present.      Mental Status: She is alert and oriented to person, place, and time.      Gait: Gait normal.   Psychiatric:         Mood and Affect: Mood and affect normal.         Speech: Speech normal.         Behavior: Behavior normal.         Thought Content: Thought content normal.         Labs:  CBC with Differential:    Lab Results   Component Value Date/Time    WBC 7.3 01/24/2024 09:28 AM    RBC 4.61 01/24/2024 09:28 AM    HGB 13.0 01/24/2024 09:28 AM    HCT 40.5 01/24/2024 09:28 AM     01/24/2024 09:28 AM    MCV 87.9 01/24/2024 09:28 AM    MCH 28.2 01/24/2024 09:28 AM    MCHC 32.1 01/24/2024 09:28 AM    RDW 13.4 01/24/2024 09:28 AM    METASPCT 0.9 08/13/2021 11:25 AM    LYMPHOPCT 28 01/24/2024 09:28 AM    MONOPCT 5 01/24/2024 09:28 AM    MYELOPCT 0.9 08/13/2021 11:25 AM    EOSPCT 4 01/24/2024 09:28 AM    BASOPCT 1 01/24/2024 09:28 AM    MONOSABS 0.38 01/24/2024 09:28 AM

## 2024-08-02 LAB
T4 FREE: 1.5 NG/DL (ref 0.9–1.7)
TSH SERPL DL<=0.05 MIU/L-ACNC: 2.52 UIU/ML (ref 0.27–4.2)

## 2025-01-06 ENCOUNTER — OFFICE VISIT (OUTPATIENT)
Dept: FAMILY MEDICINE CLINIC | Age: 60
End: 2025-01-06
Payer: COMMERCIAL

## 2025-01-06 VITALS
BODY MASS INDEX: 25.1 KG/M2 | HEART RATE: 87 BPM | OXYGEN SATURATION: 97 % | WEIGHT: 147 LBS | HEIGHT: 64 IN | SYSTOLIC BLOOD PRESSURE: 132 MMHG | TEMPERATURE: 97.7 F | RESPIRATION RATE: 15 BRPM | DIASTOLIC BLOOD PRESSURE: 80 MMHG

## 2025-01-06 DIAGNOSIS — R05.1 ACUTE COUGH: Primary | ICD-10-CM

## 2025-01-06 DIAGNOSIS — R09.89 RHONCHI: ICD-10-CM

## 2025-01-06 PROCEDURE — 99213 OFFICE O/P EST LOW 20 MIN: CPT | Performed by: FAMILY MEDICINE

## 2025-01-06 RX ORDER — LEVOFLOXACIN 500 MG/1
500 TABLET, FILM COATED ORAL DAILY
Qty: 10 TABLET | Refills: 0 | Status: SHIPPED | OUTPATIENT
Start: 2025-01-06 | End: 2025-01-16

## 2025-01-06 NOTE — PROGRESS NOTES
[facility]    Lacey Denson presents to the office today for   Chief Complaint   Patient presents with    Congestion    Cough     URI  X 8 days  Cough and congestion  No n/v  Diarrhea  No fever  Lots of sinus congestion    Review of Systems     /80   Pulse 87   Temp 97.7 °F (36.5 °C) (Temporal)   Resp 15   Ht 1.619 m (5' 3.75\")   Wt 66.7 kg (147 lb)   SpO2 97%   BMI 25.43 kg/m²   Physical Exam  Constitutional:       Appearance: Normal appearance.   HENT:      Head: Normocephalic and atraumatic.      Nose: Congestion present.      Mouth/Throat:      Mouth: Mucous membranes are moist.      Pharynx: Posterior oropharyngeal erythema present.   Eyes:      Extraocular Movements: Extraocular movements intact.      Conjunctiva/sclera: Conjunctivae normal.   Cardiovascular:      Rate and Rhythm: Normal rate.      Heart sounds: Normal heart sounds.   Pulmonary:      Effort: Pulmonary effort is normal.      Breath sounds: Rhonchi present.   Skin:     General: Skin is warm.   Neurological:      Mental Status: She is alert and oriented to person, place, and time.   Psychiatric:         Mood and Affect: Mood normal.         Behavior: Behavior normal.            Current Outpatient Medications:     levoFLOXacin (LEVAQUIN) 500 MG tablet, Take 1 tablet by mouth daily for 10 days, Disp: 10 tablet, Rfl: 0    azelastine (ASTELIN) 0.1 % nasal spray, 1 spray by Nasal route 2 times daily Use in each nostril as directed, Disp: 1 each, Rfl: 11    levothyroxine (SYNTHROID) 50 MCG tablet, Take one tablet by mouth Mon-Sat, two tablets by mouth on Sunday, Disp: 100 tablet, Rfl: 3    Turmeric (QC TUMERIC COMPLEX PO), Take by mouth, Disp: , Rfl:     VITAMIN E PO, Take by mouth Taking two a day unknown mg maybe  450, Disp: , Rfl:     Vitamin D (CHOLECALCIFEROL) 25 MCG (1000 UT) TABS tablet, Take 1 tablet by mouth daily, Disp: , Rfl:     APPLE CIDER VINEGAR PO, Take by mouth, Disp: , Rfl:     FIBER PO, Take by mouth daily,

## 2025-08-20 ENCOUNTER — OFFICE VISIT (OUTPATIENT)
Dept: SURGERY | Age: 60
End: 2025-08-20
Payer: COMMERCIAL

## 2025-08-20 VITALS
HEART RATE: 62 BPM | DIASTOLIC BLOOD PRESSURE: 80 MMHG | OXYGEN SATURATION: 93 % | SYSTOLIC BLOOD PRESSURE: 140 MMHG | RESPIRATION RATE: 20 BRPM | TEMPERATURE: 98.4 F

## 2025-08-20 DIAGNOSIS — Z98.890 S/P BREAST RECONSTRUCTION: Primary | ICD-10-CM

## 2025-08-20 PROCEDURE — 99212 OFFICE O/P EST SF 10 MIN: CPT | Performed by: PHYSICIAN ASSISTANT

## (undated) DEVICE — CLOTH SURG PREP PREOPERATIVE CHLORHEXIDINE GLUC 2% READYPREP

## (undated) DEVICE — SYRINGE IRRIG 60ML SFT PLIABLE BLB EZ TO GRP 1 HND USE W/

## (undated) DEVICE — 4-PORT MANIFOLD: Brand: NEPTUNE 2

## (undated) DEVICE — SUTURE BOOTIES, YELLOW, STERILE, 5 PAIR/PAD; 5 PADS/BOX: Brand: KEY SURGICAL SUTURE BOOTIES

## (undated) DEVICE — ADHESIVE SKIN CLSR 0.7ML TOP DERMBND ADV

## (undated) DEVICE — GLOVE SURG SZ 65 THK91MIL LTX FREE SYN POLYISOPRENE

## (undated) DEVICE — SYRINGE MED 10ML TRNSLUC BRL PLUNG BLK MRK POLYPR CTRL

## (undated) DEVICE — ADHESIVE SKIN CLOSURE TOP 36 CC HI VISC DERMBND MINI

## (undated) DEVICE — TOWEL,OR,DSP,ST,BLUE,STD,6/PK,12PK/CS: Brand: MEDLINE

## (undated) DEVICE — GLOVE SURG L12IN SZ 65FNGR THK94MIL TRNSLUC YEL LTX

## (undated) DEVICE — DRAIN CHN 19FR L0.25MM DIA6.3MM SIL RND HUBLESS FULL FLUT

## (undated) DEVICE — BRA POSTOP 38-40 NUDE MARENA

## (undated) DEVICE — BASIN EMESIS

## (undated) DEVICE — 3M™ STERI-STRIP™ REINFORCED ADHESIVE SKIN CLOSURES, R1548, 1 IN X 5 IN (25 MM X 125 MM), 4 STRIPS/ENVELOPE: Brand: 3M™ STERI-STRIP™

## (undated) DEVICE — FUNNEL 2 KELLER

## (undated) DEVICE — STERILE POLYISOPRENE POWDER-FREE SURGICAL GLOVES: Brand: PROTEXIS

## (undated) DEVICE — MARKER SURG MARGIN STD 6 CLR INK ASST CORR CLP

## (undated) DEVICE — DRAPE THER FLUID WARMING 66X44 IN FLAT SLUSH DBL DISC ORS

## (undated) DEVICE — GLOVE ORANGE PI 7   MSG9070

## (undated) DEVICE — TUBING, SUCTION, 3/16" X 12', STRAIGHT: Brand: MEDLINE

## (undated) DEVICE — CYSTO/BLADDER IRRIGATION SET, REGULATING CLAMP

## (undated) DEVICE — APPLIER LIG CLP M L11IN TI STR RNG HNDL FOR 20 CLP DISP

## (undated) DEVICE — STANDARD HYPODERMIC NEEDLE,ALUMINUM HUB: Brand: MONOJECT

## (undated) DEVICE — SYRINGE MED 10ML LUERLOCK TIP W/O SFTY DISP

## (undated) DEVICE — C-ARM: Brand: UNBRANDED

## (undated) DEVICE — BANDAGE COMPR W6INXL10YD ST M E WHITE/BEIGE

## (undated) DEVICE — STRIP,CLOSURE,WOUND,MEDI-STRIP,1/2X4: Brand: MEDLINE

## (undated) DEVICE — SOLUTION IRRIG 3000ML 0.9% SOD CHL USP UROMATIC PLAS CONT

## (undated) DEVICE — PACK,UNIVERSAL,NO GOWNS: Brand: MEDLINE

## (undated) DEVICE — BRA SURGICALXL FULL SUPP SFT CUP FR CLSR ADJ STRP

## (undated) DEVICE — Device

## (undated) DEVICE — TRAY PROCED PLASTIC CUSTOME SOFT TISS

## (undated) DEVICE — Z DISCONTINUED USE 2272117 DRAPE SURG 3 QTR N INVASIVE 2 LAYR DISP

## (undated) DEVICE — EXTRAS MASTECTOMY

## (undated) DEVICE — PLASMABLADE PS210-030S 3.0S LOCK: Brand: PLASMABLADE™

## (undated) DEVICE — SPONGE LAP W18XL18IN WHT COT 4 PLY FLD STRUNG RADPQ DISP ST

## (undated) DEVICE — ELECTRODE PT RET AD L9FT HI MOIST COND ADH HYDRGEL CORDED

## (undated) DEVICE — 18 GA N.G. KIT, 10 PACK: Brand: SITE-RITE

## (undated) DEVICE — ULTRA HIGH PROFILE, UH 650CC GEL SIZER: Brand: MENTOR MEMORYGEL RESTERILIZABLE GEL SIZER

## (undated) DEVICE — DECANTER BAG 9": Brand: MEDLINE INDUSTRIES, INC.

## (undated) DEVICE — SYRINGE MED 50ML LUERLOCK TIP

## (undated) DEVICE — PATIENT RETURN ELECTRODE, SINGLE-USE, CONTACT QUALITY MONITORING, ADULT, WITH 9FT CORD, FOR PATIENTS WEIGING OVER 33LBS. (15KG): Brand: MEGADYNE

## (undated) DEVICE — APPLICATOR MEDICATED 26 CC SOLUTION HI LT ORNG CHLORAPREP

## (undated) DEVICE — BANDAGE,GAUZE,4.5"X4.1YD,STERILE,LF: Brand: MEDLINE

## (undated) DEVICE — INTENDED FOR TISSUE SEPARATION, AND OTHER PROCEDURES THAT REQUIRE A SHARP SURGICAL BLADE TO PUNCTURE OR CUT.: Brand: BARD-PARKER ® STAINLESS STEEL BLADES

## (undated) DEVICE — ADHESIVE SKIN CLSR 0.7ML SGL PEEL PCH GEL WTRPRF FOR WOUNDS

## (undated) DEVICE — SAFETY PINS: Brand: CENTURION

## (undated) DEVICE — SET SURG INSTR MINI VASC

## (undated) DEVICE — DOUBLE BASIN SET: Brand: MEDLINE INDUSTRIES, INC.

## (undated) DEVICE — STAPLER SKIN L39MM DIA0.53MM CRWN 5.7MM S STL FIX HD PROX

## (undated) DEVICE — SOLUTION IV 100ML 0.9% SOD CHL PLAS CONT USP VIAFLX 1 PER

## (undated) DEVICE — SURGICAL PROCEDURE PACK BASIC

## (undated) DEVICE — SET INST MINOR PROCEDURE

## (undated) DEVICE — NEEDLE HYPO 27GA L1.25IN GRY POLYPR HUB S STL REG BVL STR

## (undated) DEVICE — UNIVERSAL DRAPE: Brand: MEDLINE INDUSTRIES, INC.

## (undated) DEVICE — LABEL MED 4 IN SURG PANEL W/ PEN STRL